# Patient Record
Sex: FEMALE | Race: BLACK OR AFRICAN AMERICAN | Employment: OTHER | URBAN - METROPOLITAN AREA
[De-identification: names, ages, dates, MRNs, and addresses within clinical notes are randomized per-mention and may not be internally consistent; named-entity substitution may affect disease eponyms.]

---

## 2017-02-22 ENCOUNTER — APPOINTMENT (OUTPATIENT)
Dept: GENERAL RADIOLOGY | Age: 72
End: 2017-02-22
Attending: EMERGENCY MEDICINE
Payer: MEDICARE

## 2017-02-22 ENCOUNTER — HOSPITAL ENCOUNTER (EMERGENCY)
Age: 72
Discharge: HOME | End: 2017-02-22
Attending: EMERGENCY MEDICINE | Admitting: FAMILY MEDICINE
Payer: MEDICARE

## 2017-02-22 ENCOUNTER — APPOINTMENT (OUTPATIENT)
Dept: CT IMAGING | Age: 72
End: 2017-02-22
Attending: FAMILY MEDICINE
Payer: MEDICARE

## 2017-02-22 VITALS
RESPIRATION RATE: 16 BRPM | HEART RATE: 93 BPM | WEIGHT: 204 LBS | BODY MASS INDEX: 34.83 KG/M2 | SYSTOLIC BLOOD PRESSURE: 148 MMHG | TEMPERATURE: 98 F | OXYGEN SATURATION: 98 % | DIASTOLIC BLOOD PRESSURE: 74 MMHG | HEIGHT: 64 IN

## 2017-02-22 DIAGNOSIS — R07.9 CHEST PAIN, UNSPECIFIED TYPE: Primary | ICD-10-CM

## 2017-02-22 PROBLEM — J44.9 COPD (CHRONIC OBSTRUCTIVE PULMONARY DISEASE) (HCC): Chronic | Status: ACTIVE | Noted: 2017-02-22

## 2017-02-22 LAB
ALBUMIN SERPL BCP-MCNC: 3.6 G/DL (ref 3.4–5)
ALBUMIN/GLOB SERPL: 0.8 {RATIO} (ref 0.8–1.7)
ALP SERPL-CCNC: 85 U/L (ref 45–117)
ALT SERPL-CCNC: 24 U/L (ref 13–56)
AMPHET UR QL SCN: NEGATIVE
ANION GAP BLD CALC-SCNC: 9 MMOL/L (ref 3–18)
APTT PPP: 26.5 SEC (ref 23–36.4)
AST SERPL W P-5'-P-CCNC: 19 U/L (ref 15–37)
ATRIAL RATE: 76 BPM
BARBITURATES UR QL SCN: NEGATIVE
BASOPHILS # BLD AUTO: 0 K/UL (ref 0–0.06)
BASOPHILS # BLD AUTO: 0 K/UL (ref 0–0.06)
BASOPHILS # BLD: 0 % (ref 0–2)
BASOPHILS # BLD: 0 % (ref 0–2)
BENZODIAZ UR QL: NEGATIVE
BILIRUB SERPL-MCNC: 0.5 MG/DL (ref 0.2–1)
BNP SERPL-MCNC: 243 PG/ML (ref 0–900)
BUN SERPL-MCNC: 10 MG/DL (ref 7–18)
BUN/CREAT SERPL: 12 (ref 12–20)
CALCIUM SERPL-MCNC: 9.1 MG/DL (ref 8.5–10.1)
CALCULATED P AXIS, ECG09: 49 DEGREES
CALCULATED R AXIS, ECG10: 18 DEGREES
CALCULATED T AXIS, ECG11: 64 DEGREES
CANNABINOIDS UR QL SCN: NEGATIVE
CHLORIDE SERPL-SCNC: 102 MMOL/L (ref 100–108)
CHOLEST SERPL-MCNC: 131 MG/DL
CK MB CFR SERPL CALC: 0.8 % (ref 0–4)
CK MB SERPL-MCNC: 1 NG/ML (ref 5–25)
CK SERPL-CCNC: 120 U/L (ref 26–192)
CO2 SERPL-SCNC: 31 MMOL/L (ref 21–32)
COCAINE UR QL SCN: NEGATIVE
CREAT SERPL-MCNC: 0.83 MG/DL (ref 0.6–1.3)
DIAGNOSIS, 93000: NORMAL
DIFFERENTIAL METHOD BLD: ABNORMAL
DIFFERENTIAL METHOD BLD: NORMAL
EOSINOPHIL # BLD: 0.1 K/UL (ref 0–0.4)
EOSINOPHIL # BLD: 0.4 K/UL (ref 0–0.4)
EOSINOPHIL NFR BLD: 1 % (ref 0–5)
EOSINOPHIL NFR BLD: 5 % (ref 0–5)
ERYTHROCYTE [DISTWIDTH] IN BLOOD BY AUTOMATED COUNT: 13 % (ref 11.6–14.5)
ERYTHROCYTE [DISTWIDTH] IN BLOOD BY AUTOMATED COUNT: 13.1 % (ref 11.6–14.5)
EST. AVERAGE GLUCOSE BLD GHB EST-MCNC: 177 MG/DL
GLOBULIN SER CALC-MCNC: 4.6 G/DL (ref 2–4)
GLUCOSE BLD STRIP.AUTO-MCNC: 190 MG/DL (ref 70–110)
GLUCOSE SERPL-MCNC: 105 MG/DL (ref 74–99)
HBA1C MFR BLD: 7.8 % (ref 4.5–5.6)
HCT VFR BLD AUTO: 37.7 % (ref 35–45)
HCT VFR BLD AUTO: 38 % (ref 35–45)
HDLC SERPL-MCNC: 52 MG/DL (ref 40–60)
HDLC SERPL: 2.5 {RATIO} (ref 0–5)
HDSCOM,HDSCOM: NORMAL
HGB BLD-MCNC: 12.7 G/DL (ref 12–16)
HGB BLD-MCNC: 12.7 G/DL (ref 12–16)
LDLC SERPL CALC-MCNC: 66.8 MG/DL (ref 0–100)
LIPASE SERPL-CCNC: 169 U/L (ref 73–393)
LIPID PROFILE,FLP: NORMAL
LYMPHOCYTES # BLD AUTO: 15 % (ref 21–52)
LYMPHOCYTES # BLD AUTO: 34 % (ref 21–52)
LYMPHOCYTES # BLD: 1 K/UL (ref 0.9–3.6)
LYMPHOCYTES # BLD: 2.6 K/UL (ref 0.9–3.6)
MAGNESIUM SERPL-MCNC: 1.5 MG/DL (ref 1.8–2.4)
MCH RBC QN AUTO: 30 PG (ref 24–34)
MCH RBC QN AUTO: 30 PG (ref 24–34)
MCHC RBC AUTO-ENTMCNC: 33.4 G/DL (ref 31–37)
MCHC RBC AUTO-ENTMCNC: 33.7 G/DL (ref 31–37)
MCV RBC AUTO: 89.1 FL (ref 74–97)
MCV RBC AUTO: 89.8 FL (ref 74–97)
METHADONE UR QL: NEGATIVE
MONOCYTES # BLD: 0.1 K/UL (ref 0.05–1.2)
MONOCYTES # BLD: 0.5 K/UL (ref 0.05–1.2)
MONOCYTES NFR BLD AUTO: 1 % (ref 3–10)
MONOCYTES NFR BLD AUTO: 6 % (ref 3–10)
NEUTS SEG # BLD: 4.2 K/UL (ref 1.8–8)
NEUTS SEG # BLD: 5.8 K/UL (ref 1.8–8)
NEUTS SEG NFR BLD AUTO: 55 % (ref 40–73)
NEUTS SEG NFR BLD AUTO: 83 % (ref 40–73)
OPIATES UR QL: NEGATIVE
P-R INTERVAL, ECG05: 168 MS
PCP UR QL: NEGATIVE
PHOSPHATE SERPL-MCNC: 3.2 MG/DL (ref 2.5–4.9)
PLATELET # BLD AUTO: 280 K/UL (ref 135–420)
PLATELET # BLD AUTO: 310 K/UL (ref 135–420)
PMV BLD AUTO: 10 FL (ref 9.2–11.8)
PMV BLD AUTO: 10.7 FL (ref 9.2–11.8)
POTASSIUM SERPL-SCNC: 4 MMOL/L (ref 3.5–5.5)
PROT SERPL-MCNC: 8.2 G/DL (ref 6.4–8.2)
Q-T INTERVAL, ECG07: 400 MS
QRS DURATION, ECG06: 78 MS
QTC CALCULATION (BEZET), ECG08: 450 MS
RBC # BLD AUTO: 4.23 M/UL (ref 4.2–5.3)
RBC # BLD AUTO: 4.23 M/UL (ref 4.2–5.3)
SODIUM SERPL-SCNC: 142 MMOL/L (ref 136–145)
TRIGL SERPL-MCNC: 61 MG/DL (ref ?–150)
TROPONIN I SERPL-MCNC: <0.02 NG/ML (ref 0–0.06)
TROPONIN I SERPL-MCNC: <0.02 NG/ML (ref 0–0.06)
VENTRICULAR RATE, ECG03: 76 BPM
VLDLC SERPL CALC-MCNC: 12.2 MG/DL
WBC # BLD AUTO: 7 K/UL (ref 4.6–13.2)
WBC # BLD AUTO: 7.6 K/UL (ref 4.6–13.2)

## 2017-02-22 PROCEDURE — 80053 COMPREHEN METABOLIC PANEL: CPT | Performed by: EMERGENCY MEDICINE

## 2017-02-22 PROCEDURE — 71275 CT ANGIOGRAPHY CHEST: CPT

## 2017-02-22 PROCEDURE — 93306 TTE W/DOPPLER COMPLETE: CPT

## 2017-02-22 PROCEDURE — 82550 ASSAY OF CK (CPK): CPT | Performed by: FAMILY MEDICINE

## 2017-02-22 PROCEDURE — 85730 THROMBOPLASTIN TIME PARTIAL: CPT | Performed by: FAMILY MEDICINE

## 2017-02-22 PROCEDURE — 80061 LIPID PANEL: CPT | Performed by: FAMILY MEDICINE

## 2017-02-22 PROCEDURE — 71010 XR CHEST PORT: CPT

## 2017-02-22 PROCEDURE — 74011000250 HC RX REV CODE- 250: Performed by: EMERGENCY MEDICINE

## 2017-02-22 PROCEDURE — 85025 COMPLETE CBC W/AUTO DIFF WBC: CPT | Performed by: EMERGENCY MEDICINE

## 2017-02-22 PROCEDURE — 83690 ASSAY OF LIPASE: CPT | Performed by: EMERGENCY MEDICINE

## 2017-02-22 PROCEDURE — 83880 ASSAY OF NATRIURETIC PEPTIDE: CPT | Performed by: EMERGENCY MEDICINE

## 2017-02-22 PROCEDURE — 74011636637 HC RX REV CODE- 636/637: Performed by: EMERGENCY MEDICINE

## 2017-02-22 PROCEDURE — 80307 DRUG TEST PRSMV CHEM ANLYZR: CPT | Performed by: FAMILY MEDICINE

## 2017-02-22 PROCEDURE — 99285 EMERGENCY DEPT VISIT HI MDM: CPT

## 2017-02-22 PROCEDURE — 74011636320 HC RX REV CODE- 636/320: Performed by: EMERGENCY MEDICINE

## 2017-02-22 PROCEDURE — 84100 ASSAY OF PHOSPHORUS: CPT | Performed by: FAMILY MEDICINE

## 2017-02-22 PROCEDURE — 77030013140 HC MSK NEB VYRM -A

## 2017-02-22 PROCEDURE — 94640 AIRWAY INHALATION TREATMENT: CPT

## 2017-02-22 PROCEDURE — 96360 HYDRATION IV INFUSION INIT: CPT

## 2017-02-22 PROCEDURE — 74011250637 HC RX REV CODE- 250/637: Performed by: EMERGENCY MEDICINE

## 2017-02-22 PROCEDURE — 84484 ASSAY OF TROPONIN QUANT: CPT | Performed by: EMERGENCY MEDICINE

## 2017-02-22 PROCEDURE — 83735 ASSAY OF MAGNESIUM: CPT | Performed by: FAMILY MEDICINE

## 2017-02-22 PROCEDURE — 83036 HEMOGLOBIN GLYCOSYLATED A1C: CPT | Performed by: FAMILY MEDICINE

## 2017-02-22 PROCEDURE — 74011250636 HC RX REV CODE- 250/636: Performed by: FAMILY MEDICINE

## 2017-02-22 PROCEDURE — 93005 ELECTROCARDIOGRAM TRACING: CPT

## 2017-02-22 PROCEDURE — 36415 COLL VENOUS BLD VENIPUNCTURE: CPT | Performed by: FAMILY MEDICINE

## 2017-02-22 PROCEDURE — 82962 GLUCOSE BLOOD TEST: CPT

## 2017-02-22 RX ORDER — NORTRIPTYLINE HYDROCHLORIDE 25 MG/1
100 CAPSULE ORAL
Status: ON HOLD | COMMUNITY
End: 2019-02-24

## 2017-02-22 RX ORDER — INSULIN GLARGINE 100 [IU]/ML
30 INJECTION, SOLUTION SUBCUTANEOUS DAILY
Status: ON HOLD | COMMUNITY
End: 2022-08-08

## 2017-02-22 RX ORDER — GABAPENTIN 300 MG/1
300 CAPSULE ORAL DAILY
COMMUNITY
End: 2021-08-12

## 2017-02-22 RX ORDER — ASPIRIN 325 MG
325 TABLET ORAL
Status: COMPLETED | OUTPATIENT
Start: 2017-02-22 | End: 2017-02-22

## 2017-02-22 RX ORDER — HEPARIN SODIUM 10000 [USP'U]/100ML
18-36 INJECTION, SOLUTION INTRAVENOUS
Status: DISCONTINUED | OUTPATIENT
Start: 2017-02-22 | End: 2017-02-22

## 2017-02-22 RX ORDER — HEPARIN SODIUM 1000 [USP'U]/ML
80 INJECTION, SOLUTION INTRAVENOUS; SUBCUTANEOUS ONCE
Status: COMPLETED | OUTPATIENT
Start: 2017-02-22 | End: 2017-02-22

## 2017-02-22 RX ORDER — MONTELUKAST SODIUM 10 MG/1
10 TABLET ORAL EVERY EVENING
Status: ON HOLD | COMMUNITY
End: 2019-02-25

## 2017-02-22 RX ORDER — PREDNISONE 20 MG/1
60 TABLET ORAL
Status: COMPLETED | OUTPATIENT
Start: 2017-02-22 | End: 2017-02-22

## 2017-02-22 RX ORDER — LANOLIN ALCOHOL/MO/W.PET/CERES
2000 CREAM (GRAM) TOPICAL DAILY
COMMUNITY
End: 2021-08-12

## 2017-02-22 RX ORDER — GLIMEPIRIDE 4 MG/1
4 TABLET ORAL
COMMUNITY
End: 2021-08-12

## 2017-02-22 RX ORDER — SIMVASTATIN 20 MG/1
20 TABLET, FILM COATED ORAL
COMMUNITY
End: 2018-07-30

## 2017-02-22 RX ORDER — GABAPENTIN 300 MG/1
600 CAPSULE ORAL EVERY EVENING
COMMUNITY
End: 2021-08-12

## 2017-02-22 RX ORDER — ACETAMINOPHEN 325 MG/1
325 TABLET ORAL
COMMUNITY
End: 2017-05-28

## 2017-02-22 RX ORDER — IPRATROPIUM BROMIDE AND ALBUTEROL SULFATE 2.5; .5 MG/3ML; MG/3ML
3 SOLUTION RESPIRATORY (INHALATION)
Status: COMPLETED | OUTPATIENT
Start: 2017-02-22 | End: 2017-02-22

## 2017-02-22 RX ORDER — HEPARIN SODIUM 5000 [USP'U]/ML
5000 INJECTION, SOLUTION INTRAVENOUS; SUBCUTANEOUS EVERY 8 HOURS
Status: DISCONTINUED | OUTPATIENT
Start: 2017-02-22 | End: 2017-02-22

## 2017-02-22 RX ORDER — SODIUM CHLORIDE 0.9 % (FLUSH) 0.9 %
5-10 SYRINGE (ML) INJECTION EVERY 8 HOURS
Status: DISCONTINUED | OUTPATIENT
Start: 2017-02-22 | End: 2017-02-22 | Stop reason: HOSPADM

## 2017-02-22 RX ORDER — LOSARTAN POTASSIUM AND HYDROCHLOROTHIAZIDE 12.5; 1 MG/1; MG/1
1 TABLET ORAL DAILY
COMMUNITY
End: 2021-08-12

## 2017-02-22 RX ORDER — IPRATROPIUM BROMIDE AND ALBUTEROL SULFATE 2.5; .5 MG/3ML; MG/3ML
3 SOLUTION RESPIRATORY (INHALATION)
Status: DISCONTINUED | OUTPATIENT
Start: 2017-02-22 | End: 2017-02-22 | Stop reason: HOSPADM

## 2017-02-22 RX ORDER — HYDROCODONE BITARTRATE AND ACETAMINOPHEN 5; 325 MG/1; MG/1
1 TABLET ORAL
Status: COMPLETED | OUTPATIENT
Start: 2017-02-22 | End: 2017-02-22

## 2017-02-22 RX ORDER — SODIUM CHLORIDE 0.9 % (FLUSH) 0.9 %
5-10 SYRINGE (ML) INJECTION AS NEEDED
Status: DISCONTINUED | OUTPATIENT
Start: 2017-02-22 | End: 2017-02-22 | Stop reason: HOSPADM

## 2017-02-22 RX ORDER — ATENOLOL 100 MG/1
200 TABLET ORAL DAILY
COMMUNITY

## 2017-02-22 RX ADMIN — HEPARIN SODIUM AND DEXTROSE 18 UNITS/KG/HR: 10000; 5 INJECTION INTRAVENOUS at 15:44

## 2017-02-22 RX ADMIN — IPRATROPIUM BROMIDE AND ALBUTEROL SULFATE 3 ML: .5; 3 SOLUTION RESPIRATORY (INHALATION) at 09:57

## 2017-02-22 RX ADMIN — PREDNISONE 60 MG: 20 TABLET ORAL at 10:04

## 2017-02-22 RX ADMIN — IOPAMIDOL 100 ML: 755 INJECTION, SOLUTION INTRAVENOUS at 15:08

## 2017-02-22 RX ADMIN — HYDROCODONE BITARTRATE AND ACETAMINOPHEN 1 TABLET: 5; 325 TABLET ORAL at 12:03

## 2017-02-22 RX ADMIN — ASPIRIN 325 MG ORAL TABLET 325 MG: 325 PILL ORAL at 10:04

## 2017-02-22 RX ADMIN — HEPARIN SODIUM 7400 UNITS: 1000 INJECTION, SOLUTION INTRAVENOUS; SUBCUTANEOUS at 15:43

## 2017-02-22 RX ADMIN — IPRATROPIUM BROMIDE AND ALBUTEROL SULFATE 3 ML: .5; 3 SOLUTION RESPIRATORY (INHALATION) at 09:55

## 2017-02-22 RX ADMIN — IPRATROPIUM BROMIDE AND ALBUTEROL SULFATE 3 ML: .5; 3 SOLUTION RESPIRATORY (INHALATION) at 09:56

## 2017-02-22 NOTE — ED NOTES
RN in room for purpose of hourly rounding. Room checked for trash and safety hazards. Patient is. ..    [  ] seated at bedside. [  ] lying in bed with side rails up and call bell in reach. [ x ] lying in with call bell in reach and continuous monitoring in place. Pain reduction interventions. .. [ x ] not indicated at this time      include the following   [  ] administration of analgesic medications   [  ] lights turned down   [  ] patient offered a cool washcloth   [  ] heat applied   [  ] ice applied   [  ] patient repositioned    Restroom needs addressed. Patient is. ... [x  ] Not in need restroom assistance at this time  [  ] Ambulatory as needed to the restroom  [  ] Assisted to bedside commode  [  ] Assisted with use of bed pan  [  ] Provided with a urinal    Position. .. [ x ] Patient does not require assistance with repositioning  [  ] Patient repositions with assistance of RN  [  ] Patient repositioned with assistance of RN and other ED staff.

## 2017-02-22 NOTE — ED TRIAGE NOTES
Patient c/o chest pain  Sepsis Screening completed    (  )Patient meets SIRS criteria. (x  )Patient does not meet SIRS criteria.       SIRS Criteria is achieved when two or more of the following are present   Temperature < 96.8°F (36°C) or > 100.9°F (38.3°C)   Heart Rate > 90 beats per minute   Respiratory Rate > 20 breaths per minute   WBC count > 12,000 or <4,000 or > 10% bands

## 2017-02-22 NOTE — LETTER
[facility] 
[facility] 
[facility] [place] [address] 
[phone] Work/School Note Date: 2/22/2017 To Whom It May concern: 
 
Nhung Stone was seen and treated today in the emergency room by the following provider(s): 
Attending Provider: Zaid Steen MD. Nhung Stone please excuse from work until 02/24/2017 Sincerely, 
 
 
 
 
Dr. Zaid Steen MD

## 2017-02-22 NOTE — ED PROVIDER NOTES
[address]  EMERGENCY DEPARTMENT HISTORY AND PHYSICAL EXAM       Date: 2/22/2017   Patient Name: Tonya Mg   YOB: 1945  Medical Record Number: [id number]    History of Presenting Illness     Chief Complaint   Patient presents with    Chest Pain        History Provided By:  patient    Additional History:   8:22 AM   Tonya Mg is a 80 y.o. female with a hx of HTN, asthma, prior MI, HLD, DM, and COPD, presenting to the ED c/o intermittent CP x 3 days. States last episode was last night, and now she feels pressure. Reports MI was about 4 years ago and Stents were placed. Dr. Ирина Mccoy was pt's cardiologist. Last stress test was 2 years ago resulting wnl. Pt takes ASA and Plavix daily. Associated sxs include cough, intermittent leg swelling, and dyspnea on exertion (onset ~ 2 months, not changing). Reports CP and SOB for the last week with walking. Reports having a recent URI prior to this occurrence of CP x 3 days. Reports today's pain feels more like COPD, and has been using Neb tx at home BID without relief. Other PMHx include neuropathy. Denies any other sxs or complaints.     Primary Care Provider: Eddy Goodell, MD   Specialist:    Past History     Past Medical History:   Past Medical History:   Diagnosis Date    Asthma     Chronic obstructive pulmonary disease ([address]     Diabetes ([address]     HTN (hypertension), benign     Hyperlipidemia     Menopause     Myocardial infarction ([address]     Neuropathy         Past Surgical History:   Past Surgical History:   Procedure Laterality Date    FOOT/TOES SURGERY PROC UNLISTED      HX CARPAL TUNNEL RELEASE      HX HEMORRHOIDECTOMY      HX HYSTERECTOMY      Age 40    HX KNEE REPLACEMENT          Family History:   Family History   Problem Relation Age of Onset   Alexy Bateman Breast Cancer Mother     Breast Cancer Sister         Social History:   Social History   Substance Use Topics    Smoking status: Former Smoker Packs/day: 0.30     Years: 5.00     Quit date: 3/17/2005    Smokeless tobacco: Not on file    Alcohol use No        Allergies: Allergies   Allergen Reactions    Ibuprofen Swelling     mouth        Review of Systems   Review of Systems   Constitutional: Negative for fatigue and fever. HENT: Negative for rhinorrhea and sore throat. Respiratory: Positive for cough and shortness of breath. Cardiovascular: Positive for chest pain and leg swelling. Negative for palpitations. Gastrointestinal: Negative for abdominal pain, diarrhea, nausea and vomiting. Genitourinary: Negative for difficulty urinating and dysuria. Musculoskeletal: Negative for arthralgias and myalgias. Skin: Negative for color change and rash. Neurological: Negative for light-headedness and headaches. All other systems reviewed and are negative. Physical Exam  Vitals:    02/22/17 0930 02/22/17 0945 02/22/17 0958 02/22/17 1000   BP: 142/73 (!) 157/138  108/89   Pulse: 74 83  69   Resp: 14 19  14   Temp:       SpO2: 98% 99% 100% 100%   Weight:       Height:           Physical Exam   Nursing note and vitals reviewed. Vital signs and nursing notes reviewed    CONSTITUTIONAL: Alert, in no apparent distress; well-developed; well-nourished. HEAD:  Normocephalic, atraumatic  EYES: PERRL; EOM's intact. ENTM: Nose: no rhinorrhea; Throat: no erythema or exudate, mucous membranes moist  Neck:  No JVD, supple without lymphadenopathy  RESP: bilateral expiratory wheezing. CV: S1 and S2 WNL; No murmurs, gallops or rubs. GI: Normal bowel sounds, abdomen soft and non-tender. No masses or organomegaly. : No costo-vertebral angle tenderness. BACK:  Non-tender  UPPER EXT:  Normal inspection  LOWER EXT: No edema, no calf tenderness. Distal pulses intact. NEURO: CN intact, reflexes 2/4 and sym, strength 5/5 and sym, sensation intact. SKIN: No rashes; Normal for age and stage. PSYCH:  Alert and oriented, normal affect.     Diagnostic Study Results     Labs -      Recent Results (from the past 12 hour(s))   EKG, 12 LEAD, INITIAL    Collection Time: 02/22/17  8:16 AM   Result Value Ref Range    Ventricular Rate 76 BPM    Atrial Rate 76 BPM    P-R Interval 168 ms    QRS Duration 78 ms    Q-T Interval 400 ms    QTC Calculation (Bezet) 450 ms    Calculated P Axis 49 degrees    Calculated R Axis 18 degrees    Calculated T Axis 64 degrees    Diagnosis       Normal sinus rhythm  Septal infarct , age undetermined  Abnormal ECG  When compared with ECG of 07-DEC-2015 19:58,  Septal infarct is now present     CBC WITH AUTOMATED DIFF    Collection Time: 02/22/17  8:42 AM   Result Value Ref Range    WBC 7.6 4.6 - 13.2 K/uL    RBC 4.23 4.20 - 5.30 M/uL    HGB 12.7 12.0 - 16.0 g/dL    HCT 38.0 35.0 - 45.0 %    MCV 89.8 74.0 - 97.0 FL    MCH 30.0 24.0 - 34.0 PG    MCHC 33.4 31.0 - 37.0 g/dL    RDW 13.1 11.6 - 14.5 %    PLATELET 576 345 - 284 K/uL    MPV 10.7 9.2 - 11.8 FL    NEUTROPHILS 55 40 - 73 %    LYMPHOCYTES 34 21 - 52 %    MONOCYTES 6 3 - 10 %    EOSINOPHILS 5 0 - 5 %    BASOPHILS 0 0 - 2 %    ABS. NEUTROPHILS 4.2 1.8 - 8.0 K/UL    ABS. LYMPHOCYTES 2.6 0.9 - 3.6 K/UL    ABS. MONOCYTES 0.5 0.05 - 1.2 K/UL    ABS. EOSINOPHILS 0.4 0.0 - 0.4 K/UL    ABS.  BASOPHILS 0.0 0.0 - 0.06 K/UL    DF AUTOMATED     LIPASE    Collection Time: 02/22/17  8:42 AM   Result Value Ref Range    Lipase 169 73 - 019 U/L   METABOLIC PANEL, COMPREHENSIVE    Collection Time: 02/22/17  8:42 AM   Result Value Ref Range    Sodium 142 136 - 145 mmol/L    Potassium 4.0 3.5 - 5.5 mmol/L    Chloride 102 100 - 108 mmol/L    CO2 31 21 - 32 mmol/L    Anion gap 9 3.0 - 18 mmol/L    Glucose 105 (H) 74 - 99 mg/dL    BUN 10 7.0 - 18 MG/DL    Creatinine 0.83 0.6 - 1.3 MG/DL    BUN/Creatinine ratio 12 12 - 20      GFR est AA >60 >60 ml/min/1.73m2    GFR est non-AA >60 >60 ml/min/1.73m2    Calcium 9.1 8.5 - 10.1 MG/DL    Bilirubin, total 0.5 0.2 - 1.0 MG/DL    ALT (SGPT) 24 13 - 56 U/L    AST (SGOT) 19 15 - 37 U/L    Alk. phosphatase 85 45 - 117 U/L    Protein, total 8.2 6.4 - 8.2 g/dL    Albumin 3.6 3.4 - 5.0 g/dL    Globulin 4.6 (H) 2.0 - 4.0 g/dL    A-G Ratio 0.8 0.8 - 1.7     TROPONIN I    Collection Time: 02/22/17  8:42 AM   Result Value Ref Range    Troponin-I, Qt. <0.02 0.00 - 0.06 NG/ML   PRO-BNP    Collection Time: 02/22/17  8:42 AM   Result Value Ref Range    NT pro- 0 - 900 PG/ML       Radiologic Studies -    XR CHEST PORT   Final Result   IMPRESSION:     1. Stable exam. No acute cardiopulmonary process.     As read by the radiologist.         Medical Decision Making   I am the first provider for this patient. I reviewed the vital signs, available nursing notes, past medical history, past surgical history, family history and social history. Vital Signs-Reviewed the patient's vital signs. Patient Vitals for the past 12 hrs:   Temp Pulse Resp BP SpO2   02/22/17 1000 - 69 14 108/89 100 %   02/22/17 0958 - - - - 100 %   02/22/17 0945 - 83 19 (!) 157/138 99 %   02/22/17 0930 - 74 14 142/73 98 %   02/22/17 0915 - 75 14 (!) 113/92 98 %   02/22/17 0900 - 74 15 161/71 98 %   02/22/17 0845 - 73 15 142/74 99 %   02/22/17 0830 - 80 15 - 97 %   02/22/17 0825 98.3 °F (36.8 °C) 75 18 154/76 100 %   02/22/17 0820 98.6 °F (37 °C) 69 14 108/89 100 %       Pulse Oximetry Analysis - Normal 100% on room air     Cardiac Monitor:   Rate: 77 bpm  Rhythm: Normal Sinus Rhythm      EKG interpretation: (Preliminary)  Rate 76 bpm. MI interval 168 ms. QRS duration 78 ms. QT/QTc 400/450 ms. NSR. Abnormal ECG. No ischemia. No STEMI. EKG read by Anisa Mayer MD at 8:16 AM     CARDIAC MONITOR NOTE:  10:40 AM   Cardiac Rhythm:  NSR  Rate: 74 bpm  Respiration rate 15. Pulse oximetry on room air is 98%. Intervention: continue to monitor. Contact cardilogy     Old Medical Records: Old medical records. Previous electrocardiograms. Nursing notes.      Provider Notes:   INITIAL CLINICAL IMPRESSION and PLANS:  The patient presents with the primary complaint(s) of: CP. The presentation, to include historical aspects and clinical findings are consistent with the DX of Atypical ACS. However, other possible DX's to consider as primary, associated with, or exacerbated by include:    1. Heart failure  2. PNA  3. COPD Exacerbation    Considering the above, my initial management plan to evaluate and therapeutic interventions include the following and as noted in the orders:    1. Labs: CBC, Lipase, CMP, Troponin, Pro-BNP  2. Imaging: CXR, EKG      Plan to obtain serial troponin, and call to cardiologist after Albuterol, prednisone, and ASA. ED Course:      Medications Given in the ED:  Medications   albuterol-ipratropium (DUO-NEB) 2.5 MG-0.5 MG/3 ML (3 mL Nebulization Given 2/22/17 0977)   aspirin (ASPIRIN) tablet 325 mg (325 mg Oral Given 2/22/17 1004)   predniSONE (DELTASONE) tablet 60 mg (60 mg Oral Given 2/22/17 1004)        PROGRESS NOTE:  8:22 AM   Initial assessment performed. 10:35 AM   The patient is feeling no change in CP after 3 DuoNebs. Will consult cardiologist for outpatient vs inpatient management. 10:47 AM   Discussed patient's history, exam, and available diagnostics results with Meri Garcia MD, cardiology, who agree to evaluate pt in ED. 11:03 AM Discussed patient's history, exam, and available diagnostics results with Jignesh Bernard DO, internal medicine, who agree with admission to telemetry. 11:03 AM  Patient is being admitted to the hospital by Jignesh Bernard DO. The results of their tests and reasons for their admission have been discussed with them and/or available family. They convey agreement and understanding for the need to be admitted and for their admission diagnosis. No distress. No change with albuterol. Neg trop. No ischemia on EKG. Plan to admit for stress. Last stress 2 years ago. CONDITIONS ON ADMISSION:  Thrombosis is not present at the time of admission.  Pneumonia is not present at the time of admission. MRSA is not present at the time of admission. Wound infection is not present at the time of admission. Pressure Ulcer is not present at the time of admission. Diagnosis   Clinical Impression:   1. Chest pain, unspecified type     2. Hypertension  _______________________________   Attestations: This note is prepared by Leonidas Plunkett, acting as a Scribe for Tamra Brooks MD on 8:19 AM on 2/22/2017 . Tamra Brooks MD: The scribe's documentation has been prepared under my direction and personally reviewed by me in its entirety.   _______________________________

## 2017-02-22 NOTE — ED NOTES
RN in room for purpose of hourly rounding. Room checked for trash and safety hazards. Patient is. ..    [  ] seated at bedside. [  ] lying in bed with side rails up and call bell in reach. [ x ] lying in with call bell in reach and continuous monitoring in place. Pain reduction interventions. .. [  x] not indicated at this time      include the following   [  ] administration of analgesic medications   [  ] lights turned down   [  ] patient offered a cool washcloth   [  ] heat applied   [  ] ice applied   [  ] patient repositioned    Restroom needs addressed. Patient is. ...    [  ] Not in need restroom assistance at this time  [ x ] Ambulatory as needed to the restroom  [  ] Assisted to bedside commode  [  ] Assisted with use of bed pan  [  ] Provided with a urinal    Position. .. [  x] Patient does not require assistance with repositioning  [  ] Patient repositions with assistance of RN  [  ] Patient repositioned with assistance of RN and other ED staff.

## 2017-02-22 NOTE — H&P
Medicine History and Physical    Patient: Rashawn Rosas   Age:  80 y.o.    HPI:   Rashawn Rosas is a 80 y.o. female who presents to the ED c/o chest pain that started 1 week ago but had gone away only to re-occur this morning. Pain is substernal and starts tight but then feels like pressure. Associated symptoms include numbness in toes (but not fingers), globus sensation, dyspnea, nausea, diaphoresis, and dizziness. She denies any recent changes to medications or activities. She noticed the pain last week when walking up the stairs but this episode woke her from sleep. She has had an MI in the past per records but has not followed with a cardiologist or a number of years. Cardiology was consulted by the ED physician and has ordered an echocardiogram.  She has been experiencing viral upper respiratory infection symptoms for the past 2 weeks that are very slowly improving.       Past Medical History:  Past Medical History:   Diagnosis Date    Asthma     [facility]     Diabetes ([facility]     HTN (hypertension), benign     Hyperlipidemia     Menopause     Myocardial infarction (HCC)     Neuropathy        Past Surgical History:  Past Surgical History:   Procedure Laterality Date    FOOT/TOES SURGERY PROC UNLISTED      HX CARPAL TUNNEL RELEASE      HX HEMORRHOIDECTOMY      HX HYSTERECTOMY      Age 40    HX KNEE REPLACEMENT         Family History:  Family History   Problem Relation Age of Onset   [facility] Breast Cancer Mother     Breast Cancer Sister        Social History:  Social History     Social History    Marital status:      Spouse name: N/A    Number of children: N/A    Years of education: N/A     Social History Main Topics    Smoking status: Former Smoker     Packs/day: 0.30     Years: 5.00     Quit date: 3/17/2005    Smokeless tobacco: None    Alcohol use No    Drug use: No    Sexual activity: Not Asked     Other Topics Concern    None     Social History Narrative       Home Medications:  Prior to Admission medications    Medication Sig Start Date End Date Taking? Authorizing Provider   atenolol (TENORMIN) 100 mg tablet Take 200 mg by mouth daily. Yes Historical Provider   gabapentin (NEURONTIN) 300 mg capsule Take 300 mg by mouth daily. 300mg am and 600mg pm   Yes Historical Provider   gabapentin (NEURONTIN) 300 mg capsule Take 600 mg by mouth every evening. Yes Historical Provider   losartan-hydroCHLOROthiazide (HYZAAR) 100-12.5 mg per tablet Take 1 Tab by mouth daily. Yes Historical Provider   simvastatin (ZOCOR) 20 mg tablet Take 20 mg by mouth nightly. Yes Historical Provider   nortriptyline (PAMELOR) 25 mg capsule Take 100 mg by mouth nightly. Yes Historical Provider   mometasone-formoterol (DULERA) 200-5 mcg/actuation HFA inhaler Take 2 Puffs by inhalation two (2) times a day. Yes Historical Provider   insulin glargine (LANTUS) 100 unit/mL injection 30 Units by SubCUTAneous route daily. Yes Historical Provider   montelukast (SINGULAIR) 10 mg tablet Take 10 mg by mouth every evening. Yes Historical Provider   cyanocobalamin 1,000 mcg tablet Take 2,000 mcg by mouth daily. Yes Historical Provider   glimepiride (AMARYL) 4 mg tablet Take 4 mg by mouth every morning. Yes Historical Provider   aspirin 81 mg chewable tablet Take 1 Tab by mouth daily. 12/9/15  Yes Fritz Steen MD   tiotropium bromide (SPIRIVA RESPIMAT) 2.5 mcg/actuation mist Take 1 Puff by inhalation two (2) times a day. Yes Krishna Heredia MD   metformin (GLUCOPHAGE) 850 mg tablet Take 850 mg by mouth three (3) times daily. 3/17/11  Yes Historical Provider   acetaminophen (TYLENOL) 325 mg tablet Take 325 mg by mouth every six (6) hours as needed for Pain. Historical Provider       Allergies:   Allergies   Allergen Reactions    Ibuprofen Swelling     mouth       Review of Systems  A comprehensive review of systems was negative except for that written in the History of Present Illness. Physical Exam:     Visit Vitals    /63    Pulse 93    Temp 98.3 °F (36.8 °C)    Resp 13    Ht 5' 4\" (1.626 m)    Wt 92.5 kg (204 lb)    SpO2 96%    BMI 35.02 kg/m2       Physical Exam:  General appearance: alert, cooperative, no distress, appears stated age  Head: Normocephalic, without obvious abnormality, atraumatic  Neck: supple, trachea midline  Lungs: faint scattered wheezing, otherwise clear to auscultation bilaterally  Heart: regular rate and rhythm, S1, S2 normal, no murmur, click, rub or gallop  Abdomen: soft, non-tender. Bowel sounds normal. No masses,  no organomegaly  Extremities: extremities normal, atraumatic, no cyanosis or edema; left posterior calf tenderness  Skin: Skin color, texture, turgor normal. No rashes or lesions  Neurologic: Grossly normal    Intake and Output:  Current Shift:     Last three shifts:       Lab/Data Reviewed: All lab results for the last 24 hours reviewed. Medications Reviewed. Assessment/Plan     Hospital Problems  Never Reviewed          Codes Class Noted POA    * (Principal)Chest pain in adult ICD-10-CM: R07.9  ICD-9-CM: 786.50  2/22/2017 Yes        COPD (chronic obstructive pulmonary disease) (HCC) (Chronic) ICD-10-CM: J44.9  ICD-9-CM: 757  2/22/2017 Yes        HTN (hypertension), benign ICD-10-CM: I10  ICD-9-CM: 401.1  Unknown Yes        Asthma ICD-10-CM: J45.909  ICD-9-CM: 493.90  Unknown Yes        Hyperlipidemia ICD-10-CM: E78.5  ICD-9-CM: 272.4  Unknown Yes            Atypical chest pain presentation. Given posterior calf tenderness and sudden onset of sharp chest pain, will rule out PE w/ CTA of chest.  Start heparin drip until this can be ruled out. Trend CEx2 more q6h. Echo pending. If echo normal, no PE, and CE remain negative, can likely discharge home later today w/ outpatient follow up, assuming cardiology does not want to perform an inpatient stress test.      Holding home medications pending above studies.   Keep NPO until surgical intervention ruled out. Duo-nebs prn for wheezing. FENGI: Saline lock; therapeutic heparin, NPO, no GI prophylaxis needed    Dispo: Observation status. Likely length of stay <48h or 2 midnights.      Lazaro Williamson DO

## 2017-02-22 NOTE — ED NOTES
Telephone call with Dr Williamson requesting diet for pt. States he is not ordering her a diet until she is cleared. States he will be down to see patient. Patient made aware of this .  Patient up to the bathroom via wheelchair with RN

## 2017-02-22 NOTE — ED NOTES
RN in room for purpose of hourly rounding. Room checked for trash and safety hazards. Patient is. ..    [  ] seated at bedside. [  ] lying in bed with side rails up and call bell in reach. [x  ] lying in with call bell in reach and continuous monitoring in place. Pain reduction interventions. ..    [  ] not indicated at this time      include the following   [  ] administration of analgesic medications   [  x] lights turned down   [  ] patient offered a cool washcloth   [  ] heat applied   [  ] ice applied   [  ] patient repositioned    Restroom needs addressed. Patient is. ...    [  ] Not in need restroom assistance at this time  [ x ] Ambulatory as needed to the restroom  [  ] Assisted to bedside commode  [  ] Assisted with use of bed pan  [  ] Provided with a urinal    Position. ..    [  ]x Patient does not require assistance with repositioning  [  ] Patient repositions with assistance of RN  [  ] Patient repositioned with assistance of RN and other ED staff.

## 2017-02-22 NOTE — Clinical Note
Status[de-identified] Inpatient [101] Type of Bed: Telemetry [19] Inpatient Hospitalization Certified Necessary for the Following Reasons: 3. Patient receiving treatment that can only be provided in an inpatient setting (further clarification in H&P documentation) Admitting Diagnosis: Chest pain in adult [[id number]] Admitting Physician: Eva Zapata [[id number]] Attending Physician: Eva Zapata [[id number]] Estimated Length of Stay: > or = to 2 Midnights Discharge Plan[de-identified] Home with Office Follow-up

## 2017-02-22 NOTE — ED NOTES
RN in room for purpose of hourly rounding. Room checked for trash and safety hazards. Patient is. ..    [  ] seated at bedside. [  ] lying in bed with side rails up and call bell in reach. [ x ] lying in with call bell in reach and continuous monitoring in place. Pain reduction interventions. .. [ x ] not indicated at this time      include the following   [  ] administration of analgesic medications   [  ] lights turned down   [  ] patient offered a cool washcloth   [  ] heat applied   [  ] ice applied   [  ] patient repositioned    Restroom needs addressed. Patient is. ... [x  ] Not in need restroom assistance at this time  [  ] Ambulatory as needed to the restroom  [  ] Assisted to bedside commode  [  ] Assisted with use of bed pan  [  ] Provided with a urinal    Position. .. [x  ] Patient does not require assistance with repositioning  [  ] Patient repositions with assistance of RN  [  ] Patient repositioned with assistance of RN and other ED staff.

## 2017-02-22 NOTE — ED NOTES
Consulted with Dr. Williamson about results of cta ordered to d/c heparin gtt and he will be down to see patient.

## 2017-02-22 NOTE — PROGRESS NOTES
Admission Medication Reconciliation has been performed on this ED patient consisting of interview of the patient regarding their PTA Home Medication List, Allergies and PMH as well as obtaining outpatient pharmacy information. Interviewed patient who was a good historian. Patient did  provide her medicine bottles with the exception of Gabapentin which pt states she is waiting for refill. Patient's outpatient pharmacy is [facility]  Smoking status is quit 15 yr ago  Alcohol use denies  Illicit drug use denies  Patient ABX use within the past 30 days = none  Has patient received any antineoplastics in the past 30 days? na  Has patient received any radiation treatments in the past 45 days?  na      Medication Reconciliation Interventions:   Wrong Medication Identified 1  Wrong/missing medication strength or dose identified  4  Wrong/missing Interval Identified 1  Wrong/missing Route Identified 0  Medication Duplication 0  Omissions 7  Commissions 6  Other Issue(s) Identified (Indicate): 0            Medication Compliance Issues and/or Medication Concerns: 0    Kylie Alfred [id number] Pharmacist  [phone]

## 2017-02-23 NOTE — CONSULTS
[address] [place] [address] Rd    Name:  Richa Chang  MR#:  [id number]  :  1945  Account #:  [de-identified]  Date of Adm:  2017  Date of Consultation:  2017      CONCLUSIONS  1. Chest pain, noncardiac. No evidence of acute coronary syndrome or  ST-elevation myocardial infarction. 2. Chronic obstructive pulmonary disease. 3. Asthma. 4. Diabetes. 5. Hypertension. 6. Hyperlipidemia. 7. Menopause. 8. Prior myocardial infarction. RECOMMENDATIONS: The patient I think is ruled out for a MI or ACS  at this time. She really presents with noncardiac symptoms per se with  a recent URI. I believe she can be discharged home and followed up  as an outpatient. It probably should have a stress test at some point in  the future as an outpatient since it has been a couple years since she  has had it done. DISCUSSION: The patient's records were reviewed. The patient was  interviewed and examined. The patient is a 81-year-old lady who presented to the ED complaining  of chest pain that started a week ago, but had gone away and recurred  this morning and brought her to the ED. She felt like she had a  pressure in her chest. She also had some numbness in her toes, but  not fingers. She has some dyspnea, nausea, diaphoresis and  dizziness. She had a recent upper respiratory infection. PAST MEDICAL HISTORY: Positive for asthma, COPD, diabetes,  hypertension, hyperlipidemia, menopause, myocardial infarction,  neuropathy. PAST SURGICAL HISTORY: Includes foot surgery, carpal tunnel  release, hemorrhoidectomy, hysterectomy, knee replacements. FAMILY HISTORY: Positive for breast cancer. SOCIAL HISTORY: The patient is . She is a former smoker. Denies alcohol use or illicit drug use. MEDICATIONS PRIOR TO ADMISSION  1. Tenormin. 2. Neurontin. 3. Hyzaar. 4. Pamelor. 5. Zocor. 6. Dulera. 7. Lantus. 8. Singulair. 9. B12.  10. Amaryl. 11. Aspirin.   12. Spiriva. 13. Glucophage. 14. Tylenol. DRUG ALLERGIES: IBUPROFEN. REVIEW OF SYSTEMS  GENERAL: The patient has had a recent URI, but no fever, chills,  weight loss or gain. HEENT: No headache, rhinorrhea, epistaxis, neck pain/stiffness, sore  throat. RESPIRATORY: Positive for recent URI symptoms. CARDIAC: Chest pain, but no typical chest pain. No syncope. No  palpitations. ABDOMINAL: No nausea, vomiting, diarrhea, hematochezia, or  melena. MUSCULOSKELETAL: No myalgias or arthralgias. GASTROINTESTINAL: No nausea, vomiting, diarrhea, hematochezia,  or melena. GENITOURINARY: No pyuria. No hematuria. ENDOCRINE: No polyphagia, polyuria, heat or cold intolerance. HEMATOLOGIC: No bruising or bleeding, known clotting disorder. NEUROLOGIC: No TIA, CVA, seizures. PHYSICAL EXAMINATION  GENERAL: Reveals a well-developed, well-nourished woman in no  severe distress. VITAL SIGNS: Blood pressure 150/65. HEENT: Pupils are equal and react to light and accommodation. Sclerae and conjunctivae are clear. No scleral icterus. Mouth, nose,  and throat appear normal. Oropharynx appears normal.  NECK: Supple. Thyroid is not enlarged. There is no JVD. Carotid  pulses are present bilaterally. There are no bruits. Trachea is in the  midline. CHEST: Lungs are clear to auscultation bilaterally. I do not hear any  rales, rhonchi or wheezes. CARDIAC: Precordium is normal to palpation. There are no heaves,  thrills, or ectopic impulses. First and second heart sounds are normal. I  do not hear any murmurs, gallops, or rubs. ABDOMEN: Soft. Liver and spleen are not enlarged. No abdominal  masses or bruits are appreciated. Bowel sounds are present. Abdominal aorta is not palpable. EXTREMITIES: No cyanosis or clubbing. Peripheral pulses are present  bilaterally. NEUROLOGICAL: The patient is alert and oriented. Cranial nerves 2-  12 appear intact. Motor and sensation are grossly intact.  Gait and  station are not tested. ASSESSMENT/PLAN: At this time, the patient is no evidence for acute  coronary syndrome, non-ST elevation myocardial infarction  or ST-  elevation myocardial infarction. I believe she can be discharged and  followed up as an outpatient. Thank you for asking me to see her.         MD Ila Jules / Cecilia Tomas  D:  02/22/2017   19:32  T:  02/22/2017   20:08  Job #:  [id number]

## 2017-02-23 NOTE — DISCHARGE INSTRUCTIONS
DISCHARGE SUMMARY from Nurse    The following personal items are in your possession at time of discharge:       Visual Aid: None                            PATIENT INSTRUCTIONS:      What to do at Home:       please follow up with Dr. Mehreen Schneider in 3-5 days. *  Please give a list of your current medications to your Primary Care Provider. *  Please update this list whenever your medications are discontinued, doses are      changed, or new medications (including over-the-counter products) are added. *  Please carry medication information at all times in case of emergency situations. These are general instructions for a healthy lifestyle:    No smoking/ No tobacco products/ Avoid exposure to second hand smoke    Surgeon General's Warning:  Quitting smoking now greatly reduces serious risk to your health. Obesity, smoking, and sedentary lifestyle greatly increases your risk for illness    A healthy diet, regular physical exercise & weight monitoring are important for maintaining a healthy lifestyle    You may be retaining fluid if you have a history of heart failure or if you experience any of the following symptoms:  Weight gain of 3 pounds or more overnight or 5 pounds in a week, increased swelling in our hands or feet or shortness of breath while lying flat in bed. Please call your doctor as soon as you notice any of these symptoms; do not wait until your next office visit. Recognize signs and symptoms of STROKE:    F-face looks uneven    A-arms unable to move or move unevenly    S-speech slurred or non-existent    T-time-call 911 as soon as signs and symptoms begin-DO NOT go       Back to bed or wait to see if you get better-TIME IS BRAIN. Warning Signs of HEART ATTACK     Call 911 if you have these symptoms:   Chest discomfort. Most heart attacks involve discomfort in the center of the chest that lasts more than a few minutes, or that goes away and comes back.  It can feel like uncomfortable pressure, squeezing, fullness, or pain.  Discomfort in other areas of the upper body. Symptoms can include pain or discomfort in one or both arms, the back, neck, jaw, or stomach.  Shortness of breath with or without chest discomfort.  Other signs may include breaking out in a cold sweat, nausea, or lightheadedness. Don't wait more than five minutes to call 911 - MINUTES MATTER! Fast action can save your life. Calling 911 is almost always the fastest way to get lifesaving treatment. Emergency Medical Services staff can begin treatment when they arrive -- up to an hour sooner than if someone gets to the hospital by car. The discharge information has been reviewed with the patient. The patient verbalized understanding. Discharge medications reviewed with the patient and appropriate educational materials and side effects teaching were provided.

## 2017-02-23 NOTE — ED NOTES
Bedside report complete. Patient was introduced to oncoming  [place] ([facility]). Patient updated on plan of care. Safety check performed: Bed locked in low posiiton. Side rails up. Call bell and personal items within reach.  Waiting for

## 2017-05-21 ENCOUNTER — APPOINTMENT (OUTPATIENT)
Dept: CT IMAGING | Age: 72
End: 2017-05-21
Attending: EMERGENCY MEDICINE
Payer: MEDICARE

## 2017-05-21 ENCOUNTER — HOSPITAL ENCOUNTER (EMERGENCY)
Age: 72
Discharge: HOME | End: 2017-05-21
Attending: EMERGENCY MEDICINE
Payer: MEDICARE

## 2017-05-21 VITALS
BODY MASS INDEX: 32.44 KG/M2 | DIASTOLIC BLOOD PRESSURE: 57 MMHG | HEART RATE: 81 BPM | WEIGHT: 190 LBS | HEIGHT: 64 IN | SYSTOLIC BLOOD PRESSURE: 162 MMHG | OXYGEN SATURATION: 95 % | RESPIRATION RATE: 18 BRPM | TEMPERATURE: 97.6 F

## 2017-05-21 DIAGNOSIS — M48.061 LUMBAR CANAL STENOSIS: ICD-10-CM

## 2017-05-21 DIAGNOSIS — M54.31 SCIATICA, RIGHT SIDE: Primary | ICD-10-CM

## 2017-05-21 PROCEDURE — 74011250636 HC RX REV CODE- 250/636: Performed by: INTERNAL MEDICINE

## 2017-05-21 PROCEDURE — 74011250636 HC RX REV CODE- 250/636: Performed by: EMERGENCY MEDICINE

## 2017-05-21 PROCEDURE — 72131 CT LUMBAR SPINE W/O DYE: CPT

## 2017-05-21 PROCEDURE — 96375 TX/PRO/DX INJ NEW DRUG ADDON: CPT

## 2017-05-21 PROCEDURE — 99283 EMERGENCY DEPT VISIT LOW MDM: CPT

## 2017-05-21 PROCEDURE — 96374 THER/PROPH/DIAG INJ IV PUSH: CPT

## 2017-05-21 RX ORDER — KETOROLAC TROMETHAMINE 30 MG/ML
15 INJECTION, SOLUTION INTRAMUSCULAR; INTRAVENOUS
Status: COMPLETED | OUTPATIENT
Start: 2017-05-21 | End: 2017-05-21

## 2017-05-21 RX ORDER — HYDROCODONE BITARTRATE AND ACETAMINOPHEN 5; 325 MG/1; MG/1
TABLET ORAL
Qty: 12 TAB | Refills: 0 | Status: SHIPPED | OUTPATIENT
Start: 2017-05-21 | End: 2017-05-28

## 2017-05-21 RX ORDER — SODIUM CHLORIDE 0.9 % (FLUSH) 0.9 %
5-10 SYRINGE (ML) INJECTION EVERY 8 HOURS
Status: DISCONTINUED | OUTPATIENT
Start: 2017-05-21 | End: 2017-05-21 | Stop reason: HOSPADM

## 2017-05-21 RX ORDER — DOXYCYCLINE 100 MG/1
100 CAPSULE ORAL 2 TIMES DAILY
COMMUNITY
End: 2017-11-20 | Stop reason: ALTCHOICE

## 2017-05-21 RX ORDER — METHYLPREDNISOLONE 4 MG/1
TABLET ORAL
Qty: 1 DOSE PACK | Refills: 0 | Status: SHIPPED | OUTPATIENT
Start: 2017-05-21 | End: 2017-05-21

## 2017-05-21 RX ORDER — NAPROXEN SODIUM 220 MG
440 TABLET ORAL 2 TIMES DAILY WITH MEALS
Qty: 20 TAB | Refills: 0 | Status: SHIPPED | OUTPATIENT
Start: 2017-05-21 | End: 2018-10-12

## 2017-05-21 RX ORDER — SODIUM CHLORIDE 0.9 % (FLUSH) 0.9 %
5-10 SYRINGE (ML) INJECTION AS NEEDED
Status: DISCONTINUED | OUTPATIENT
Start: 2017-05-21 | End: 2017-05-21 | Stop reason: HOSPADM

## 2017-05-21 RX ORDER — HYDROMORPHONE HYDROCHLORIDE 1 MG/ML
1 INJECTION, SOLUTION INTRAMUSCULAR; INTRAVENOUS; SUBCUTANEOUS ONCE
Status: COMPLETED | OUTPATIENT
Start: 2017-05-21 | End: 2017-05-21

## 2017-05-21 RX ORDER — METHYLPREDNISOLONE 4 MG/1
TABLET ORAL
Qty: 1 DOSE PACK | Refills: 0 | Status: SHIPPED | OUTPATIENT
Start: 2017-05-21 | End: 2018-07-23

## 2017-05-21 RX ORDER — ONDANSETRON 2 MG/ML
4 INJECTION INTRAMUSCULAR; INTRAVENOUS
Status: COMPLETED | OUTPATIENT
Start: 2017-05-21 | End: 2017-05-21

## 2017-05-21 RX ORDER — NAPROXEN SODIUM 220 MG
440 TABLET ORAL 2 TIMES DAILY WITH MEALS
Qty: 20 TAB | Refills: 0 | Status: SHIPPED | OUTPATIENT
Start: 2017-05-21 | End: 2017-05-21

## 2017-05-21 RX ADMIN — KETOROLAC TROMETHAMINE 15 MG: 30 INJECTION, SOLUTION INTRAMUSCULAR at 08:17

## 2017-05-21 RX ADMIN — HYDROMORPHONE HYDROCHLORIDE 1 MG: 1 INJECTION, SOLUTION INTRAMUSCULAR; INTRAVENOUS; SUBCUTANEOUS at 06:52

## 2017-05-21 RX ADMIN — ONDANSETRON 4 MG: 2 INJECTION INTRAMUSCULAR; INTRAVENOUS at 06:51

## 2017-05-21 RX ADMIN — METHYLPREDNISOLONE SODIUM SUCCINATE 125 MG: 125 INJECTION, POWDER, FOR SOLUTION INTRAMUSCULAR; INTRAVENOUS at 06:52

## 2017-05-21 NOTE — ED NOTES
Bedside shift change report given to Daisy Bates RN (oncoming nurse) by Zonia Hermosillo RN (offgoing nurse). Report included the following information SBAR, ED Summary and MAR. Patient being transported to CT.  remains at bedside.

## 2017-05-21 NOTE — ED TRIAGE NOTES
C/o right leg pain for the last several days worsening this morning. Described as sharp shoot pain down leg. Hx of lower back problems and surgeries.

## 2017-05-21 NOTE — DISCHARGE INSTRUCTIONS
Lumbar Stenosis: Care Instructions  Your Care Instructions    Stenosis in the spine is a narrowing of the canal that is around the spinal cord and nerve roots in your back. It can happen as part of aging. Sometimes bone and other tissue grow into this canal and press on the spinal nerves. This can cause pain, numbness, and weakness. When it happens in the lower part of your back, it is called lumbar stenosis. Lumbar stenosis can cause problems in the legs, feet, and rear end (buttocks). You may be able to relieve symptoms of lumbar stenosis with pain medicine. Your doctor may suggest physical therapy and exercises to keep your spine strong and flexible. Some people try cortisone shots to reduce swelling. If pain and numbness in your legs are still so bad that you cannot do your normal activities, you may need surgery. Follow-up care is a key part of your treatment and safety. Be sure to make and go to all appointments, and call your doctor if you are having problems. It's also a good idea to know your test results and keep a list of the medicines you take. How can you care for yourself at home? · Take an over-the-counter pain medicine, such as acetaminophen (Tylenol), ibuprofen (Advil, Motrin), or naproxen (Aleve). Read and follow all instructions on the label. · Do not take two or more pain medicines at the same time unless the doctor told you to. Many pain medicines have acetaminophen, which is Tylenol. Too much acetaminophen (Tylenol) can be harmful. · Stay at a healthy weight. Being overweight puts extra strain on your spine. · Change positions often when you sit or stand. This can ease pain. For example, lean forward. This may reduce pressure on the spinal cord and its nerves. · Avoid doing things that make your symptoms worse. Walking downhill and standing for a long time may cause pain. · Stretch and strengthen your back muscles as your doctor or physical therapist recommends.  If your doctor says it is okay to do them, these exercises may help. ¨ Lie on your back with your knees bent. Gently pull one bent knee to your chest. Put that foot back on the floor, and then pull the other knee to your chest.  ¨ Do pelvic tilts. Lie on your back with your knees bent. Tighten your stomach muscles. Pull your belly button (navel) in and up toward your ribs. You should feel like your back is pressing to the floor and your hips and pelvis are slightly lifting off the floor. Hold for 6 seconds while breathing smoothly. ¨ Stand with your back flat against a wall. Slowly slide down until your knees are slightly bent. Hold for 10 seconds, then slide back up the wall. · Remove or change anything in your house that may cause you to fall. Keep walkways clear of clutter, electrical cords, and throw rugs. When should you call for help? Call 911 anytime you think you may need emergency care. For example, call if:  · You are unable to move a leg at all. Call your doctor now or seek immediate medical care if:  · You have new or worse symptoms in your legs, belly, or buttocks. Symptoms may include:  ¨ Numbness or tingling. ¨ Weakness. ¨ Pain. · You lose bladder or bowel control. Watch closely for changes in your health, and be sure to contact your doctor if:  · You are not getting better as expected. Where can you learn more? Go to http://joseph-khalida.info/. Tanner Bazan in the search box to learn more about \"Lumbar Stenosis: Care Instructions. \"  Current as of: May 23, 2016  Content Version: 11.2  © 6361-8685 [employer]. Care instructions adapted under license by [employer] (which disclaims liability or warranty for this information). If you have questions about a medical condition or this instruction, always ask your healthcare professional. [address] any warranty or liability for your use of this information.        Sciatica: Care Instructions  Your Care Instructions    Sciatica (say \"kos-FJ-bm-kuh\") is an irritation of one of the sciatic nerves, which come from the spinal cord in the lower back. The sciatic nerves and their branches extend down through the buttock to the foot. Sciatica can develop when an injured disc in the back presses against a spinal nerve root. Its main symptom is pain, numbness, or weakness that is often worse in the leg or foot than in the back. Sciatica often will improve and go away with time. Early treatment usually includes medicines and exercises to relieve pain. Follow-up care is a key part of your treatment and safety. Be sure to make and go to all appointments, and call your doctor if you are having problems. It's also a good idea to know your test results and keep a list of the medicines you take. How can you care for yourself at home? · Take pain medicines exactly as directed. ¨ If the doctor gave you a prescription medicine for pain, take it as prescribed. ¨ If you are not taking a prescription pain medicine, ask your doctor if you can take an over-the-counter medicine. · Use heat or ice to relieve pain. ¨ To apply heat, put a warm water bottle, heating pad set on low, or warm cloth on your back. Do not go to sleep with a heating pad on your skin. ¨ To use ice, put ice or a cold pack on the area for 10 to 20 minutes at a time. Put a thin cloth between the ice and your skin. · Avoid sitting if possible, unless it feels better than standing. · Alternate lying down with short walks. Increase your walking distance as you are able to without making your symptoms worse. · Do not do anything that makes your symptoms worse. When should you call for help? Call 911 anytime you think you may need emergency care. For example, call if:  · You are unable to move a leg at all. Call your doctor now or seek immediate medical care if:  · You have new or worse symptoms in your legs or buttocks.  Symptoms may include:  ¨ Numbness or tingling. ¨ Weakness. ¨ Pain. · You lose bladder or bowel control. Watch closely for changes in your health, and be sure to contact your doctor if:  · You are not getting better as expected. Where can you learn more? Go to http://joseph-khalida.info/. Enter [phone] in the search box to learn more about \"Sciatica: Care Instructions. \"  Current as of: May 23, 2016  Content Version: 11.2  © 2715-4888 [employer]. Care instructions adapted under license by [employer] (which disclaims liability or warranty for this information). If you have questions about a medical condition or this instruction, always ask your healthcare professional. [address] any warranty or liability for your use of this information.
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
## 2017-05-28 ENCOUNTER — HOSPITAL ENCOUNTER (EMERGENCY)
Age: 72
Discharge: HOME | End: 2017-05-28
Attending: INTERNAL MEDICINE
Payer: MEDICARE

## 2017-05-28 VITALS
TEMPERATURE: 99.5 F | WEIGHT: 190 LBS | HEIGHT: 64 IN | OXYGEN SATURATION: 98 % | RESPIRATION RATE: 16 BRPM | DIASTOLIC BLOOD PRESSURE: 62 MMHG | BODY MASS INDEX: 32.44 KG/M2 | HEART RATE: 79 BPM | SYSTOLIC BLOOD PRESSURE: 157 MMHG

## 2017-05-28 DIAGNOSIS — M54.16 LUMBAR BACK PAIN WITH RADICULOPATHY AFFECTING RIGHT LOWER EXTREMITY: Primary | ICD-10-CM

## 2017-05-28 DIAGNOSIS — M71.21 SYNOVIAL CYST OF POPLITEAL SPACE (BAKER), RIGHT KNEE: ICD-10-CM

## 2017-05-28 PROCEDURE — 74011250637 HC RX REV CODE- 250/637: Performed by: PHYSICIAN ASSISTANT

## 2017-05-28 PROCEDURE — 93971 EXTREMITY STUDY: CPT

## 2017-05-28 PROCEDURE — 96372 THER/PROPH/DIAG INJ SC/IM: CPT

## 2017-05-28 PROCEDURE — 74011250636 HC RX REV CODE- 250/636: Performed by: PHYSICIAN ASSISTANT

## 2017-05-28 PROCEDURE — 99284 EMERGENCY DEPT VISIT MOD MDM: CPT

## 2017-05-28 RX ORDER — OXYCODONE AND ACETAMINOPHEN 5; 325 MG/1; MG/1
1 TABLET ORAL
Status: COMPLETED | OUTPATIENT
Start: 2017-05-28 | End: 2017-05-28

## 2017-05-28 RX ORDER — ONDANSETRON 4 MG/1
4 TABLET, ORALLY DISINTEGRATING ORAL
Status: COMPLETED | OUTPATIENT
Start: 2017-05-28 | End: 2017-05-28

## 2017-05-28 RX ORDER — OXYCODONE AND ACETAMINOPHEN 5; 325 MG/1; MG/1
1 TABLET ORAL
Qty: 10 TAB | Refills: 0 | Status: SHIPPED | OUTPATIENT
Start: 2017-05-28 | End: 2017-06-17

## 2017-05-28 RX ORDER — KETOROLAC TROMETHAMINE 15 MG/ML
15 INJECTION, SOLUTION INTRAMUSCULAR; INTRAVENOUS
Status: COMPLETED | OUTPATIENT
Start: 2017-05-28 | End: 2017-05-28

## 2017-05-28 RX ORDER — HYDROMORPHONE HYDROCHLORIDE 1 MG/ML
0.5 INJECTION, SOLUTION INTRAMUSCULAR; INTRAVENOUS; SUBCUTANEOUS
Status: COMPLETED | OUTPATIENT
Start: 2017-05-28 | End: 2017-05-28

## 2017-05-28 RX ADMIN — HYDROMORPHONE HYDROCHLORIDE 0.5 MG: 1 INJECTION, SOLUTION INTRAMUSCULAR; INTRAVENOUS; SUBCUTANEOUS at 11:49

## 2017-05-28 RX ADMIN — ONDANSETRON 4 MG: 4 TABLET, ORALLY DISINTEGRATING ORAL at 11:48

## 2017-05-28 RX ADMIN — OXYCODONE HYDROCHLORIDE AND ACETAMINOPHEN 1 TABLET: 5; 325 TABLET ORAL at 12:46

## 2017-05-28 RX ADMIN — KETOROLAC TROMETHAMINE 15 MG: 15 INJECTION, SOLUTION INTRAMUSCULAR; INTRAVENOUS at 11:51

## 2017-05-28 NOTE — ED NOTES
Discharge instructions reviewed with the patient with opportunity for questions given. The patient verbalized understanding. Patient armband removed and shredded. Patient in stable condition. Patient waiting on ride, states on his way will be here in approximately 30 min.

## 2017-05-28 NOTE — PROCEDURES
[facility]  *** FINAL REPORT ***    Name: Olga Woody  MRN: [id number]  : 22 Dec 1945  HIS Order #: [id number]  [address] [facility] Visit #: [id number]  Date: 28 May 2017    TYPE OF TEST: Peripheral Venous Testing    REASON FOR TEST  Pain in limb    Right Leg:-  Deep venous thrombosis:           No  Superficial venous thrombosis:    No  Deep venous insufficiency:        Not examined  Superficial venous insufficiency: Not examined      INTERPRETATION/FINDINGS  Right leg :  1. Deep vein(s) visualized include the common femoral, proximal  femoral, mid femoral, distal femoral, popliteal(above knee),  popliteal(fossa), popliteal(below knee), posterior tibial and peroneal   veins. 2. No evidence of deep venous thrombosis detected in the veins  visualized. 3. No evidence of deep vein thrombosis in the contralateral common  femoral vein. 4. Superficial vein(s) visualized include the great saphenous vein. 5. No evidence of superficial thrombosis detected. ADDITIONAL COMMENTS  Complex cyst noted in the right popliteal fossa measuring 5.7 x 2.2 x  3.6 cm    I have personally reviewed the data relevant to the interpretation of  this  study.     TECHNOLOGIST: Carol Tamez, [facility], RVT/  Signed: 2017 11:40 AM    PHYSICIAN: Jelena Field MD  Signed: 2017 03:42 PM

## 2017-05-28 NOTE — ED NOTES
Pt resting supine in stretcher with light sheet for comfort. Pt tearful.  to bedside. Call light within reach.

## 2017-05-28 NOTE — ED PROVIDER NOTES
[address]  EMERGENCY DEPARTMENT HISTORY AND PHYSICAL EXAM       Date: 5/28/2017   Patient Name: Thong Kumar   YOB: 1945  Medical Record Number: [id number]    History of Presenting Illness     Chief Complaint   Patient presents with    Hip Pain        History Provided By:  patient    Additional History: 10:41 AM  Thong Kumar is a 80 y.o. female who presents to the emergency department via  C/O right hip pain (10/10) that radiates down to her right toes. Pain is worse when bearing weight on it. Symptoms include right leg weakness. Pt states she was seen here in the ED last week for the same pain and a CT. Pt was prescribed pain medication without relief. Pt was told she could not have an MRI due to having pins in her back. Pt followed up with her PCP 7 days ago. SHx knee and back. Pt had her back surgery by Dr. Ravin Alberts, but has not followed up with him about her current pain. Pt states she had Toradol and Dilaudid in the past without any adverse reaction. Pt denies new injury or fall, recent long distance travel, numbness, and any other symptoms or complaints.      Primary Care Provider: Zachary Ayers MD   Specialist:    Past History     Past Medical History:   Past Medical History:   Diagnosis Date    Asthma     Chronic obstructive pulmonary disease ([address]     Diabetes ([address]     HTN (hypertension), benign     Hyperlipidemia     Menopause     [facility]     Neuropathy     Sciatica         Past Surgical History:   Past Surgical History:   Procedure Laterality Date    FOOT/TOES SURGERY PROC UNLISTED      HX CARPAL TUNNEL RELEASE      HX HEMORRHOIDECTOMY      HX HYSTERECTOMY      Age 40    HX KNEE REPLACEMENT          Family History:   Family History   Problem Relation Age of Onset    Breast Cancer Mother     Breast Cancer Sister         Social History:   Social History   Substance Use Topics    Smoking status: Former Smoker Packs/day: 0.30     Years: 5.00     Quit date: 3/17/2005    Smokeless tobacco: Not on file    Alcohol use No        Allergies: Allergies   Allergen Reactions    Ibuprofen Swelling     mouth        Review of Systems   Review of Systems   Musculoskeletal: Positive for arthralgias (right hip pain that radiates down to her right toes). Neurological: Positive for weakness (right leg). Negative for numbness. All other systems reviewed and are negative. Physical Exam  Vitals:    05/28/17 1034 05/28/17 1148 05/28/17 1247   BP: 200/74 178/90 157/62   Pulse: 89 92 79   Resp: 16 16 16   Temp: 99.5 °F (37.5 °C)     SpO2: 97% 98% 98%   Weight: 86.2 kg (190 lb)     Height: 5' 4\" (1.626 m)         Physical Exam   Constitutional: She is oriented to person, place, and time. She appears well-developed and well-nourished. She appears distressed (moderate pain distress). HENT:   Head: Normocephalic and atraumatic. Musculoskeletal:        Legs:  Neurological: She is alert and oriented to person, place, and time. Skin: Skin is warm and dry. No rash noted. She is not diaphoretic. No erythema. Psychiatric: She has a normal mood and affect. Her behavior is normal.   Nursing note and vitals reviewed. Diagnostic Study Results     Labs -    No results found for this or any previous visit (from the past 12 hour(s)). Radiologic Studies -    The following have been ordered and reviewed:  DUPLEX LOWER EXT VENOUS RIGHT   Right leg :  1. Deep vein(s) visualized include the common femoral, proximal  femoral, mid femoral, distal femoral, popliteal(above knee),  popliteal(fossa), popliteal(below knee), posterior tibial and peroneal  veins. 2. No evidence of deep venous thrombosis detected in the veins  visualized. 3. No evidence of deep vein thrombosis in the contralateral common  femoral vein. 4. Superficial vein(s) visualized include the great saphenous vein. 5. No evidence of superficial thrombosis detected.   As read by the radiologist.               Medical Decision Making   I am the first provider for this patient. I reviewed the vital signs, available nursing notes, past medical history, past surgical history, family history and social history. Vital Signs-Reviewed the patient's vital signs. Patient Vitals for the past 12 hrs:   Temp Pulse Resp BP SpO2   05/28/17 1247 - 79 16 157/62 98 %   05/28/17 1148 - 92 16 178/90 98 %   05/28/17 1034 99.5 °F (37.5 °C) 89 16 200/74 97 %       Pulse Oximetry Analysis - Normal 100% on room air. Old Medical Records: Old medical records. Nursing notes. Procedures:   Procedures    ED Course:  10:41 AM   Initial assessment performed. The patients presenting problems have been discussed, and they are in agreement with the care plan formulated and outlined with them. I have encouraged them to ask questions as they arise throughout their visit. Medications Given in the ED:  Medications   ketorolac (TORADOL) injection 15 mg (15 mg IntraMUSCular Given 5/28/17 1151)   HYDROmorphone (PF) (DILAUDID) injection 0.5 mg (0.5 mg IntraMUSCular Given 5/28/17 1149)   ondansetron (ZOFRAN ODT) tablet 4 mg (4 mg Oral Given 5/28/17 1148)   oxyCODONE-acetaminophen (PERCOCET) 5-325 mg per tablet 1 Tab (1 Tab Oral Given 5/28/17 1246)       Discharge Note:  12:31 PM   Pt has been reexamined. Patient has no new complaints, changes, or physical findings. Care plan outlined and precautions discussed. Results were reviewed with the patient. All medications were reviewed with the patient; will d/c home. All of pt's questions and concerns were addressed. Patient was instructed and agrees to follow up with orthopedist, as well as to return to the ED upon further deterioration. Patient is ready to go home. Diagnosis   Clinical Impression:   1. Lumbar back pain with radiculopathy affecting right lower extremity    2.  Synovial cyst of popliteal space ([place]), right knee         Follow-up Information     Follow up With Details Comments Contact Info    Carissa Solis MD Schedule an appointment as soon as possible for a visit in 2 days for orthopedic follow up. [address]      THE [facility] EMERGENCY DEPT  As needed, If symptoms worsen [address]  [phone]          Current Discharge Medication List      START taking these medications    Details   oxyCODONE-acetaminophen (PERCOCET) 5-325 mg per tablet Take 1 Tab by mouth every six (6) hours as needed for Pain. Max Daily Amount: 4 Tabs. Qty: 10 Tab, Refills: 0         CONTINUE these medications which have NOT CHANGED    Details   methylPREDNISolone (MEDROL, PATTI,) 4 mg tablet Per dose pack instructions  Qty: 1 Dose Pack, Refills: 0      naproxen sodium (ALEVE) 220 mg tablet Take 2 Tabs by mouth two (2) times daily (with meals). Qty: 20 Tab, Refills: 0      atenolol (TENORMIN) 100 mg tablet Take 200 mg by mouth daily. !! gabapentin (NEURONTIN) 300 mg capsule Take 300 mg by mouth daily. 300mg am and 600mg pm      !! gabapentin (NEURONTIN) 300 mg capsule Take 600 mg by mouth every evening. losartan-hydroCHLOROthiazide (HYZAAR) 100-12.5 mg per tablet Take 1 Tab by mouth daily. simvastatin (ZOCOR) 20 mg tablet Take 20 mg by mouth nightly. nortriptyline (PAMELOR) 25 mg capsule Take 100 mg by mouth nightly. mometasone-formoterol (DULERA) 200-5 mcg/actuation HFA inhaler Take 2 Puffs by inhalation two (2) times a day. insulin glargine (LANTUS) 100 unit/mL injection 30 Units by SubCUTAneous route daily. montelukast (SINGULAIR) 10 mg tablet Take 10 mg by mouth every evening. cyanocobalamin 1,000 mcg tablet Take 2,000 mcg by mouth daily. glimepiride (AMARYL) 4 mg tablet Take 4 mg by mouth every morning. aspirin 81 mg chewable tablet Take 1 Tab by mouth daily.   Qty: 30 Tab, Refills: 0      tiotropium bromide (SPIRIVA RESPIMAT) 2.5 mcg/actuation mist Take 1 Puff by inhalation two (2) times a day. metformin (GLUCOPHAGE) 850 mg tablet Take 850 mg by mouth three (3) times daily. !! - Potential duplicate medications found. Please discuss with provider. STOP taking these medications       HYDROcodone-acetaminophen (NORCO) 5-325 mg per tablet Comments:   Reason for Stopping:         acetaminophen (TYLENOL) 325 mg tablet Comments:   Reason for Stopping:               _______________________________   Attestations: This note is prepared by Khanh Bell, acting as Scribe for [employer]. [employer]: The scribe's documentation has been prepared under my direction and personally reviewed by me in its entirety.  I confirm that the note above accurately reflects all work, treatment, procedures, and medical decision making performed by me.     _______________________________

## 2017-05-28 NOTE — DISCHARGE INSTRUCTIONS
Back Pain: Care Instructions  Your Care Instructions    Back pain has many possible causes. It is often related to problems with muscles and ligaments of the back. It may also be related to problems with the nerves, discs, or bones of the back. Moving, lifting, standing, sitting, or sleeping in an awkward way can strain the back. Sometimes you don't notice the injury until later. Arthritis is another common cause of back pain. Although it may hurt a lot, back pain usually improves on its own within several weeks. Most people recover in 12 weeks or less. Using good home treatment and being careful not to stress your back can help you feel better sooner. Follow-up care is a key part of your treatment and safety. Be sure to make and go to all appointments, and call your doctor if you are having problems. Its also a good idea to know your test results and keep a list of the medicines you take. How can you care for yourself at home? · Sit or lie in positions that are most comfortable and reduce your pain. Try one of these positions when you lie down:  ¨ Lie on your back with your knees bent and supported by large pillows. ¨ Lie on the floor with your legs on the seat of a sofa or chair. [facility] [place] on your side with your knees and hips bent and a pillow between your legs. ¨ Lie on your stomach if it does not make pain worse. · Do not sit up in bed, and avoid soft couches and twisted positions. Bed rest can help relieve pain at first, but it delays healing. Avoid bed rest after the first day of back pain. · Change positions every 30 minutes. If you must sit for long periods of time, take breaks from sitting. Get up and walk around, or lie in a comfortable position. · Try using a heating pad on a low or medium setting for 15 to 20 minutes every 2 or 3 hours. Try a warm shower in place of one session with the heating pad. · You can also try an ice pack for 10 to 15 minutes every 2 to 3 hours.  Put a thin cloth between the ice pack and your skin. · Take pain medicines exactly as directed. ¨ If the doctor gave you a prescription medicine for pain, take it as prescribed. ¨ If you are not taking a prescription pain medicine, ask your doctor if you can take an over-the-counter medicine. · Take short walks several times a day. You can start with 5 to 10 minutes, 3 or 4 times a day, and work up to longer walks. Walk on level surfaces and avoid hills and stairs until your back is better. · Return to work and other activities as soon as you can. Continued rest without activity is usually not good for your back. · To prevent future back pain, do exercises to stretch and strengthen your back and stomach. Learn how to use good posture, safe lifting techniques, and proper body mechanics. When should you call for help? Call your doctor now or seek immediate medical care if:  · You have new or worsening numbness in your legs. · You have new or worsening weakness in your legs. (This could make it hard to stand up.)  · You lose control of your bladder or bowels. Watch closely for changes in your health, and be sure to contact your doctor if:  · Your pain gets worse. · You are not getting better after 2 weeks. Where can you learn more? Go to http://joseph-khalida.info/. Enter R564 in the search box to learn more about \"Back Pain: Care Instructions. \"  Current as of: May 23, 2016  Content Version: 11.2  © 4988-6540 [employer], [employer]. Care instructions adapted under license by [employer] (which disclaims liability or warranty for this information). If you have questions about a medical condition or this instruction, always ask your healthcare professional. [address] any warranty or liability for your use of this information.

## 2017-05-28 NOTE — ED TRIAGE NOTES
C/o right hip pain that radiates down right leg that is getting worse. Seen here on 5/21/17 for came complaint, states she had f/u with her doctor since she was seen here on 5/21/17.

## 2017-06-17 ENCOUNTER — HOSPITAL ENCOUNTER (EMERGENCY)
Age: 72
Discharge: HOME | End: 2017-06-17
Attending: INTERNAL MEDICINE
Payer: MEDICARE

## 2017-06-17 VITALS
DIASTOLIC BLOOD PRESSURE: 87 MMHG | HEART RATE: 68 BPM | WEIGHT: 183 LBS | TEMPERATURE: 98.9 F | RESPIRATION RATE: 16 BRPM | BODY MASS INDEX: 31.24 KG/M2 | OXYGEN SATURATION: 100 % | SYSTOLIC BLOOD PRESSURE: 129 MMHG | HEIGHT: 64 IN

## 2017-06-17 DIAGNOSIS — M71.21 POPLITEAL CYST, RIGHT: ICD-10-CM

## 2017-06-17 DIAGNOSIS — M54.30 SCIATICA, UNSPECIFIED LATERALITY: Primary | ICD-10-CM

## 2017-06-17 DIAGNOSIS — M54.16 LUMBAR RADICULAR PAIN: ICD-10-CM

## 2017-06-17 PROCEDURE — 99282 EMERGENCY DEPT VISIT SF MDM: CPT

## 2017-06-17 PROCEDURE — 96372 THER/PROPH/DIAG INJ SC/IM: CPT

## 2017-06-17 PROCEDURE — 74011250636 HC RX REV CODE- 250/636: Performed by: INTERNAL MEDICINE

## 2017-06-17 RX ORDER — KETOROLAC TROMETHAMINE 30 MG/ML
15 INJECTION, SOLUTION INTRAMUSCULAR; INTRAVENOUS
Status: DISCONTINUED | OUTPATIENT
Start: 2017-06-17 | End: 2017-06-17 | Stop reason: CLARIF

## 2017-06-17 RX ORDER — KETOROLAC TROMETHAMINE 15 MG/ML
15 INJECTION, SOLUTION INTRAMUSCULAR; INTRAVENOUS
Status: COMPLETED | OUTPATIENT
Start: 2017-06-17 | End: 2017-06-17

## 2017-06-17 RX ORDER — HYDROMORPHONE HYDROCHLORIDE 1 MG/ML
0.5 INJECTION, SOLUTION INTRAMUSCULAR; INTRAVENOUS; SUBCUTANEOUS ONCE
Status: DISCONTINUED | OUTPATIENT
Start: 2017-06-17 | End: 2017-06-17

## 2017-06-17 RX ORDER — OXYCODONE AND ACETAMINOPHEN 5; 325 MG/1; MG/1
1 TABLET ORAL
Qty: 5 TAB | Refills: 0 | Status: SHIPPED | OUTPATIENT
Start: 2017-06-17 | End: 2017-08-30

## 2017-06-17 RX ORDER — HYDROMORPHONE HYDROCHLORIDE 1 MG/ML
0.5 INJECTION, SOLUTION INTRAMUSCULAR; INTRAVENOUS; SUBCUTANEOUS ONCE
Status: COMPLETED | OUTPATIENT
Start: 2017-06-17 | End: 2017-06-17

## 2017-06-17 RX ADMIN — HYDROMORPHONE HYDROCHLORIDE 0.5 MG: 1 INJECTION, SOLUTION INTRAMUSCULAR; INTRAVENOUS; SUBCUTANEOUS at 10:59

## 2017-06-17 RX ADMIN — KETOROLAC TROMETHAMINE 15 MG: 15 INJECTION, SOLUTION INTRAMUSCULAR; INTRAVENOUS at 10:57

## 2017-06-17 NOTE — ED PROVIDER NOTES
[address]  EMERGENCY DEPARTMENT HISTORY AND PHYSICAL EXAM       Date: 6/17/2017   Patient Name: Zulema Headley   YOB: 1945  Medical Record Number: [id number]    History of Presenting Illness     Chief Complaint   Patient presents with    Leg Pain        History Provided By:  patient    Additional History: 10:22 AM  Zulema Headley is a 80 y.o. female with Hx of arthritis who presents to the emergency department C/O pain to back to right leg that radiates into hip for the last 3-4 months. Pt was seen for similar pain 2 weeks ago. Pt denies back pain, CP, fever, chills and abd pain. Primary Care Provider: Saeed Lopez MD   Specialist:    Past History     Past Medical History:   Past Medical History:   Diagnosis Date    Asthma     Chronic obstructive pulmonary disease ([address]     Diabetes [facility]     HTN (hypertension), benign     Hyperlipidemia     Menopause     Myocardial infarction ([facility]     Neuropathy     Sciatica         Past Surgical History:   Past Surgical History:   Procedure Laterality Date    FOOT/TOES SURGERY PROC UNLISTED      HX CARPAL TUNNEL RELEASE      HX HEMORRHOIDECTOMY      HX HYSTERECTOMY      Age 40    HX KNEE REPLACEMENT          Family History:   Family History   Problem Relation Age of Onset    Breast Cancer Mother     Breast Cancer Sister         Social History:   Social History   Substance Use Topics    Smoking status: Former Smoker     Packs/day: 0.30     Years: 5.00     Quit date: 3/17/2005    Smokeless tobacco: None    Alcohol use No        Allergies: Allergies   Allergen Reactions    Ibuprofen Swelling     mouth        Review of Systems   Review of Systems   Constitutional: Negative for chills and fever. Cardiovascular: Negative for chest pain. Gastrointestinal: Negative for abdominal pain. Musculoskeletal: Positive for arthralgias and myalgias. Negative for back pain.    All other systems reviewed and are negative. Physical Exam  Vitals:    06/17/17 1011   BP: 134/82   Pulse: 82   Resp: 18   Temp: 98.9 °F (37.2 °C)   SpO2: 100%   Weight: 83 kg (183 lb)   Height: 5' 4\" (1.626 m)       Physical Exam   Constitutional: She is oriented to person, place, and time. She appears well-developed and well-nourished. HENT:   Head: Normocephalic and atraumatic. Right Ear: External ear normal.   Left Ear: External ear normal.   Nose: Nose normal.   Mouth/Throat: Oropharynx is clear and moist.   Eyes: Conjunctivae and EOM are normal. Pupils are equal, round, and reactive to light. Right eye exhibits no discharge. Left eye exhibits no discharge. No scleral icterus. Neck: Normal range of motion. Neck supple. No JVD present. No tracheal deviation present. Cardiovascular: Normal rate, regular rhythm, normal heart sounds and intact distal pulses. Pulmonary/Chest: Effort normal and breath sounds normal.   Abdominal: Soft. Bowel sounds are normal. She exhibits no distension. There is no tenderness. No HSM   Musculoskeletal: Normal range of motion. Fullness in popliteal area of right knee. No increasing warmth. Healed surgical scar on right knee. Redness to right knee. Healed lumbar surgical scar, no tenderness or step off. Right hip tenderness. Right knee 30 degree flexion. And right hip 40 degree on flexion. Neurological: She is alert and oriented to person, place, and time. She has normal reflexes. No cranial nerve deficit. She exhibits normal muscle tone. Coordination normal.   No focal motor weakness. Skin: Skin is warm and dry. No rash noted. Psychiatric: She has a normal mood and affect. Her behavior is normal.   Nursing note and vitals reviewed. Diagnostic Study Results     Labs -    No results found for this or any previous visit (from the past 12 hour(s)).     Radiologic Studies -  The following have been ordered and reviewed:  No orders to display           Medical Decision Making   I am the first provider for this patient. I reviewed the vital signs, available nursing notes, past medical history, past surgical history, family history and social history. Ddx: lumbar radiculopathy, disk herniation, strain, sprain, sciatica, or tendonitis. Doubt acute fracture or dislocation. Recent US consistent with popliteal cyst, doubt DVT    Vital Signs-Reviewed the patient's vital signs. Patient Vitals for the past 12 hrs:   Temp Pulse Resp BP SpO2   06/17/17 1011 98.9 °F (37.2 °C) 82 18 134/82 100 %     Procedures:   Procedures    ED Course:  10:22 AM  Initial assessment performed. The patients presenting problems have been discussed, and they are in agreement with the care plan formulated and outlined with them. I have encouraged them to ask questions as they arise throughout their visit. Medications Given in the ED:  Medications   ketorolac (TORADOL) injection 15 mg (not administered)   HYDROmorphone (PF) (DILAUDID) injection 0.5 mg (not administered)       Discharge Note:  10:46 AM  Pt has been reexamined. Patient has no new complaints, changes, or physical findings. Care plan outlined and precautions discussed. Results were reviewed with the patient. All medications were reviewed with the patient; will d/c home. All of pt's questions and concerns were addressed. Patient was instructed and agrees to follow up with ortho and PCP, as well as to return to the ED upon further deterioration. Patient is ready to go home. Diagnosis   Clinical Impression:     1. Sciatica, unspecified laterality    2. Lumbar radicular pain    3.  Popliteal cyst, right         Discussion:    Follow-up Information     Follow up With Details Comments Contact Info    Aleksandar Barajas MD Schedule an appointment as soon as possible for a visit in 2 days  [facility] [address] [facility]      Yisel Dudley MD Schedule an appointment as soon as possible for a visit in 2 days  [address] [facility] [address] [facility]  [phone]      THE [facility] EMERGENCY DEPT  As needed, If symptoms worsen [address] [place] [address] [phone]          Current Discharge Medication List      CONTINUE these medications which have CHANGED    Details   oxyCODONE-acetaminophen (PERCOCET) 5-325 mg per tablet Take 1 Tab by mouth every eight (8) hours as needed (for severe pain only, caution can cause drowsiness and/or constipation). Max Daily Amount: 3 Tabs. Qty: 5 Tab, Refills: 0             _______________________________   Attestations: This note is prepared by Bernadette La , acting as a Scribe for Constanza Hazel MD  on 10:19 AM on 6/17/2017 . Constanza Hazel MD: The scribe's documentation has been prepared under my direction and personally reviewed by me in its entirety.   _______________________________

## 2017-06-17 NOTE — ED TRIAGE NOTES
Pt from home with  for chief complaint of right leg pain from right hip down to right ankle x 3-4 months. Pt seen here approximately 2 weeks ago for same complaint. Pt reports the pain has not improved despite taking prescribed percocet, methylprednisolone, and naproxen. Pt described pain as sharp and shooting. Pt w/ good PVS. Pt states she has not made a follow up appointment. Sepsis Screening completed    (  )Patient meets SIRS criteria. ( X )Patient does not meet SIRS criteria.       SIRS Criteria is achieved when two or more of the following are present   Temperature < 96.8°F (36°C) or > 100.9°F (38.3°C)   Heart Rate > 90 beats per minute   Respiratory Rate > 20 breaths per minute   WBC count > 12,000 or <4,000 or > 10% bands

## 2017-06-17 NOTE — ED NOTES
Discharge instructions reviewed with the patient with opportunity for questions given. The patient verbalized understanding. Patient armband removed and shredded. Patient in stable condition. Ride at bedside.

## 2017-08-30 ENCOUNTER — APPOINTMENT (OUTPATIENT)
Dept: GENERAL RADIOLOGY | Age: 72
End: 2017-08-30
Attending: PHYSICIAN ASSISTANT
Payer: MEDICARE

## 2017-08-30 ENCOUNTER — HOSPITAL ENCOUNTER (EMERGENCY)
Age: 72
Discharge: HOME | End: 2017-08-30
Attending: EMERGENCY MEDICINE
Payer: MEDICARE

## 2017-08-30 VITALS
OXYGEN SATURATION: 99 % | SYSTOLIC BLOOD PRESSURE: 177 MMHG | TEMPERATURE: 98 F | BODY MASS INDEX: 34.49 KG/M2 | WEIGHT: 202 LBS | HEIGHT: 64 IN | RESPIRATION RATE: 16 BRPM | HEART RATE: 112 BPM | DIASTOLIC BLOOD PRESSURE: 87 MMHG

## 2017-08-30 DIAGNOSIS — M17.11 ARTHRITIS OF RIGHT KNEE: Primary | ICD-10-CM

## 2017-08-30 PROCEDURE — 74011250637 HC RX REV CODE- 250/637: Performed by: PHYSICIAN ASSISTANT

## 2017-08-30 PROCEDURE — 73564 X-RAY EXAM KNEE 4 OR MORE: CPT

## 2017-08-30 PROCEDURE — 99282 EMERGENCY DEPT VISIT SF MDM: CPT

## 2017-08-30 RX ORDER — LIDOCAINE 50 MG/G
1 PATCH TOPICAL ONCE
Status: DISCONTINUED | OUTPATIENT
Start: 2017-08-30 | End: 2017-08-30 | Stop reason: HOSPADM

## 2017-08-30 RX ORDER — OXYCODONE AND ACETAMINOPHEN 5; 325 MG/1; MG/1
1 TABLET ORAL
Qty: 20 TAB | Refills: 0 | Status: SHIPPED | OUTPATIENT
Start: 2017-08-30 | End: 2018-07-30

## 2017-08-30 RX ORDER — LIDOCAINE 50 MG/G
PATCH TOPICAL
Qty: 15 EACH | Refills: 0 | Status: SHIPPED | OUTPATIENT
Start: 2017-08-30 | End: 2018-10-12

## 2017-08-30 NOTE — ED NOTES
Care assumed for discharge. Patient armband removed and shredded. Pt given scripts x2 with discharge instructions and verbalizes understanding. Pt discharged home ambulatory with cane.

## 2017-08-30 NOTE — ED NOTES
Assumed care of patient. Patient presents complaining of right knee pain. Patient states she is scheduled for a right knee replacement and has been taking hydrocodone at home with little relief. Patient denies any injuries. Patient states she has not follow-up with orthopedic for uncontrolled pain. Patient medicated per MAR orders. Patient awaiting disposition. Call bell within reach. Will continue to monitor and assess.

## 2017-08-30 NOTE — ED PROVIDER NOTES
HPI Comments: 12:10 PM  Xin Manzano is a 80 y.o. female presenting to the ED C/O sharp right knee pain radiating up to her right hip onset 5 months ago. Pain is exacerbated when ambulating, sitting and laying down. Pt reports use of knee brace and ambulating with a cane. Pt reports use of hydrocodone, rx'ed from PCP (Dr. Angie Royal), without relief. Pt reports she is scheduled for a right knee replacement with Dr. Robinson Vital (ortho). Last saw Dr. Robinson Vital on 8/24/17. She is awaiting call for R knee replacement scheduling. Pt reports recent XR performed with Dr. Robinson Vital. PMHx includes HTN, asthma, COPD, hyperlipidemia, DM and sciatica. [place]. Pt denies new injury, and any other symptoms or complaints at this time. The history is provided by the patient. No  was used. Past Medical History:   Diagnosis Date    Asthma     Chronic obstructive pulmonary disease (HCC)     Diabetes ([address]     HTN (hypertension), benign     Hyperlipidemia     Menopause     Myocardial infarction ([address]     Neuropathy ([address]     Sciatica        Past Surgical History:   Procedure Laterality Date    FOOT/TOES SURGERY PROC UNLISTED      HX CARPAL TUNNEL RELEASE      HX HEMORRHOIDECTOMY      HX HYSTERECTOMY      Age 40    HX KNEE REPLACEMENT           Family History:   Problem Relation Age of Onset    Breast Cancer Mother     Breast Cancer Sister        Social History     Social History    Marital status:      Spouse name: N/A    Number of children: N/A    Years of education: N/A     Occupational History    Not on file.      Social History Main Topics    Smoking status: Former Smoker     Packs/day: 0.30     Years: 5.00     Quit date: 3/17/2005    Smokeless tobacco: Not on file    Alcohol use No    Drug use: No    Sexual activity: Not on file     Other Topics Concern    Not on file     Social History Narrative         ALLERGIES: Ibuprofen    Review of Systems   Constitutional: Negative for chills and fever. Cardiovascular: Negative for leg swelling. Musculoskeletal: Positive for arthralgias (right knee radiating to right hip) and gait problem. Negative for joint swelling. Skin: Negative for color change. Neurological: Negative for weakness and numbness. Vitals:    08/30/17 1146   BP: 177/87   Pulse: (!) 112   Resp: 16   Temp: 98 °F (36.7 °C)   SpO2: 99%   Weight: 91.6 kg (202 lb)   Height: 5' 4\" (1.626 m)            Physical Exam   Constitutional: She is oriented to person, place, and time. She appears well-developed and well-nourished. No distress. AA geriatric female in NAD. Alert. Sitting on edge of gurney. Appears uncomfortable at times   HENT:   Head: Normocephalic and atraumatic. Right Ear: External ear normal.   Left Ear: External ear normal.   Nose: Nose normal.   Eyes: Conjunctivae are normal.   Neck: Normal range of motion. Cardiovascular: Normal rate, regular rhythm, normal heart sounds and intact distal pulses. Exam reveals no gallop and no friction rub. No murmur heard. Pulses:       Dorsalis pedis pulses are 2+ on the right side, and 2+ on the left side. Posterior tibial pulses are 2+ on the right side, and 2+ on the left side. Pulmonary/Chest: Effort normal and breath sounds normal. No accessory muscle usage. No tachypnea. She has no decreased breath sounds. She has no rhonchi. Musculoskeletal: She exhibits no edema. Right hip: She exhibits normal range of motion, normal strength and no tenderness. Right knee: She exhibits decreased range of motion and swelling (mild). She exhibits no ecchymosis and no erythema. Tenderness found. Right upper leg: She exhibits no tenderness, no bony tenderness, no swelling and no edema. Right lower leg: She exhibits no tenderness, no bony tenderness, no swelling and no edema.         Right foot: There is normal range of motion, no tenderness, no bony tenderness, no swelling and normal capillary refill. Neurological: She is alert and oriented to person, place, and time. Skin: Skin is warm and dry. No rash noted. She is not diaphoretic. No erythema. Psychiatric: She has a normal mood and affect. Judgment normal.   Nursing note and vitals reviewed. RESULTS:    PULSE OXIMETRY NOTE:  Pulse-ox is 99% on RA. Interpretation: Normal       XR KNEE RT MIN 4 V   Final Result   Impression:     1. Small joint effusion. No fracture. 2. Mild degenerative changes particularly in the patellofemoral joint. 3. Minor patellar enthesopathy. As read by the radiologist.      12:52 PM  RADIOLOGY FINDINGS  Right Knee X-ray shows extensive degenerative changes and no acute process. Pending review by Radiologist  Recorded by Sharon Kay ED Scribe, as dictated by Ginny Salinas PA-C    Labs Reviewed - No data to display    No results found for this or any previous visit (from the past 12 hour(s)). MDM  Number of Diagnoses or Management Options  Diagnosis management comments: Known chronic OA. Hx of partial knee replacement in late 1990s. Awaiting surgery with Ortho for knee replacement. NVI. No evidence of septic joint. Doubt DVT. Will image and tx pain       Amount and/or Complexity of Data Reviewed  Tests in the radiology section of CPT®: ordered and reviewed (Right Knee XR)  Independent visualization of images, tracings, or specimens: yes (Right Knee XR)      ED Course     MEDICATIONS GIVEN:  Medications   lidocaine (LIDODERM) 5 % patch 1 Patch (1 Patch TransDERmal Apply Patch 8/30/17 1250)       Procedures    PROGRESS NOTE:  12:10 PM  Initial assessment performed. Recorded by Candelario Whyte ED Scribe, as dictated by Ginny Salinas PA-C. See MDM. Chronic OA. Awaiting surgical intervention with Ortho. Percocet for pain control. Stop norco. Reviewed risks of taking 2 narcs together. Lidoderm patch. Ortho FU. Reasons to RTED discussed with pt.  All questions answered. Pt feels comfortable going home at this time. Pt expressed understanding and she agrees with plan. Discharge Note:  1:19 PM  Debra Trujillo's results have been reviewed with her and/or her family. She has been counseled regarding her diagnosis, treatment, and plan. She verbally conveys understanding and agreement of the signs, symptoms, diagnosis, treatment and prognosis and additionally agrees to follow up as discussed. She also agrees with the care-plan and conveys that all of her questions have been answered. I have also provided discharge instructions for her that include: educational information regarding the diagnosis and treatment, and a list of reasons why She would want to return to the ED prior to her follow-up appointment, should her condition change. Proper ED utilization discussed with the patient. CLINICAL IMPRESSION    1. Arthritis of right knee        After visit plan:  D/c home    Current Discharge Medication List      START taking these medications    Details   lidocaine (LIDODERM) 5 % Apply patch to the affected area for 12 hours a day and remove for 12 hours a day. Qty: 15 Each, Refills: 0         CONTINUE these medications which have CHANGED    Details   oxyCODONE-acetaminophen (PERCOCET) 5-325 mg per tablet Take 1 Tab by mouth every four (4) hours as needed for Pain. Max Daily Amount: 6 Tabs. Do NOT take with norco  Qty: 20 Tab, Refills: 0             Follow-up Information     Follow up With Details Comments Contact Info    Yoandy Young MD Schedule an appointment as soon as possible for a visit in 2 days For orthopedic follow up. [address] [place] [address]  [phone]      THE [facility] EMERGENCY DEPT Go to As needed, If symptoms worsen [address] [address] [phone]          This note is prepared by Suha Gallegos, acting as Scribe for Colby Dillon PA-C.     Colby Dillon PA-C: The scribe's documentation has been prepared under my direction and personally reviewed by me in its entirety. I confirm that the note above accurately reflects all work, treatment, procedures, and medical decision making performed by me.

## 2017-08-30 NOTE — ED TRIAGE NOTES
Pt c/o severe right knee pain unrelieved with hydrocodone prescribed by orthopedic physician;  Supposed to be scheduled for right knee replacement by Dr Miguel Douglass;  Pt did not call office regarding medication;   No recent injury or fall

## 2017-08-30 NOTE — DISCHARGE INSTRUCTIONS

## 2017-11-13 ENCOUNTER — HOSPITAL ENCOUNTER (OUTPATIENT)
Dept: LAB | Age: 72
Discharge: HOME | End: 2017-11-13

## 2017-11-13 LAB
INR PPP: 1.9 (ref 0.8–1.2)
PROTHROMBIN TIME: 21 SEC (ref 11.5–15.2)

## 2017-11-13 PROCEDURE — 85610 PROTHROMBIN TIME: CPT | Performed by: INTERNAL MEDICINE

## 2017-11-14 ENCOUNTER — HOSPITAL ENCOUNTER (OUTPATIENT)
Dept: LAB | Age: 72
Discharge: HOME | End: 2017-11-14

## 2017-11-14 LAB
ANION GAP SERPL CALC-SCNC: 9 MMOL/L (ref 3–18)
BASOPHILS # BLD: 0 K/UL (ref 0–0.06)
BASOPHILS NFR BLD: 0 % (ref 0–2)
BUN SERPL-MCNC: 8 MG/DL (ref 7–18)
BUN/CREAT SERPL: 10 (ref 12–20)
CALCIUM SERPL-MCNC: 9.1 MG/DL (ref 8.5–10.1)
CHLORIDE SERPL-SCNC: 98 MMOL/L (ref 100–108)
CO2 SERPL-SCNC: 29 MMOL/L (ref 21–32)
CREAT SERPL-MCNC: 0.8 MG/DL (ref 0.6–1.3)
DIFFERENTIAL METHOD BLD: ABNORMAL
EOSINOPHIL # BLD: 0 K/UL (ref 0–0.4)
EOSINOPHIL NFR BLD: 0 % (ref 0–5)
ERYTHROCYTE [DISTWIDTH] IN BLOOD BY AUTOMATED COUNT: 13.9 % (ref 11.6–14.5)
GLUCOSE SERPL-MCNC: 99 MG/DL (ref 74–99)
HCT VFR BLD AUTO: 27.6 % (ref 35–45)
HGB BLD-MCNC: 9.1 G/DL (ref 12–16)
LYMPHOCYTES # BLD: 2.3 K/UL (ref 0.9–3.6)
LYMPHOCYTES NFR BLD: 30 % (ref 21–52)
MCH RBC QN AUTO: 29.4 PG (ref 24–34)
MCHC RBC AUTO-ENTMCNC: 33 G/DL (ref 31–37)
MCV RBC AUTO: 89 FL (ref 74–97)
MONOCYTES # BLD: 0.8 K/UL (ref 0.05–1.2)
MONOCYTES NFR BLD: 11 % (ref 3–10)
NEUTS SEG # BLD: 4.6 K/UL (ref 1.8–8)
NEUTS SEG NFR BLD: 59 % (ref 40–73)
PLATELET # BLD AUTO: 374 K/UL (ref 135–420)
PMV BLD AUTO: 10.3 FL (ref 9.2–11.8)
POTASSIUM SERPL-SCNC: 4.9 MMOL/L (ref 3.5–5.5)
RBC # BLD AUTO: 3.1 M/UL (ref 4.2–5.3)
SODIUM SERPL-SCNC: 136 MMOL/L (ref 136–145)
WBC # BLD AUTO: 7.8 K/UL (ref 4.6–13.2)

## 2017-11-14 PROCEDURE — 80048 BASIC METABOLIC PNL TOTAL CA: CPT | Performed by: INTERNAL MEDICINE

## 2017-11-14 PROCEDURE — 85025 COMPLETE CBC W/AUTO DIFF WBC: CPT | Performed by: INTERNAL MEDICINE

## 2017-11-16 ENCOUNTER — HOSPITAL ENCOUNTER (OUTPATIENT)
Dept: LAB | Age: 72
Discharge: HOME | End: 2017-11-16

## 2017-11-16 LAB
INR PPP: 2.1 (ref 0.8–1.2)
PROTHROMBIN TIME: 23.1 SEC (ref 11.5–15.2)

## 2017-11-16 PROCEDURE — 85610 PROTHROMBIN TIME: CPT | Performed by: INTERNAL MEDICINE

## 2017-11-18 ENCOUNTER — HOME HEALTH ADMISSION (OUTPATIENT)
Dept: HOME HEALTH SERVICES | Facility: HOME HEALTH | Age: 72
End: 2017-11-18
Payer: MEDICARE

## 2017-11-20 ENCOUNTER — HOME CARE VISIT (OUTPATIENT)
Dept: SCHEDULING | Facility: HOME HEALTH | Age: 72
End: 2017-11-20
Payer: MEDICARE

## 2017-11-20 ENCOUNTER — HOME CARE VISIT (OUTPATIENT)
Dept: HOME HEALTH SERVICES | Facility: HOME HEALTH | Age: 72
End: 2017-11-20

## 2017-11-20 VITALS
DIASTOLIC BLOOD PRESSURE: 80 MMHG | SYSTOLIC BLOOD PRESSURE: 120 MMHG | HEART RATE: 78 BPM | RESPIRATION RATE: 17 BRPM | TEMPERATURE: 98 F

## 2017-11-20 PROCEDURE — 3331090002 HH PPS REVENUE DEBIT

## 2017-11-20 PROCEDURE — G0299 HHS/HOSPICE OF RN EA 15 MIN: HCPCS

## 2017-11-20 PROCEDURE — 3331090001 HH PPS REVENUE CREDIT

## 2017-11-20 PROCEDURE — 400013 HH SOC

## 2017-11-20 PROCEDURE — G0151 HHCP-SERV OF PT,EA 15 MIN: HCPCS

## 2017-11-21 ENCOUNTER — APPOINTMENT (OUTPATIENT)
Dept: GENERAL RADIOLOGY | Age: 72
End: 2017-11-21
Attending: EMERGENCY MEDICINE
Payer: MEDICARE

## 2017-11-21 ENCOUNTER — APPOINTMENT (OUTPATIENT)
Dept: NUCLEAR MEDICINE | Age: 72
End: 2017-11-21
Attending: EMERGENCY MEDICINE
Payer: MEDICARE

## 2017-11-21 ENCOUNTER — HOSPITAL ENCOUNTER (EMERGENCY)
Age: 72
Discharge: HOME | End: 2017-11-21
Attending: EMERGENCY MEDICINE
Payer: MEDICARE

## 2017-11-21 VITALS
TEMPERATURE: 97.6 F | RESPIRATION RATE: 14 BRPM | DIASTOLIC BLOOD PRESSURE: 47 MMHG | SYSTOLIC BLOOD PRESSURE: 105 MMHG | HEART RATE: 67 BPM | OXYGEN SATURATION: 97 %

## 2017-11-21 VITALS
DIASTOLIC BLOOD PRESSURE: 77 MMHG | WEIGHT: 200 LBS | RESPIRATION RATE: 20 BRPM | OXYGEN SATURATION: 99 % | BODY MASS INDEX: 34.15 KG/M2 | HEIGHT: 64 IN | HEART RATE: 67 BPM | TEMPERATURE: 98 F | SYSTOLIC BLOOD PRESSURE: 98 MMHG

## 2017-11-21 DIAGNOSIS — R06.02 SHORTNESS OF BREATH: Primary | ICD-10-CM

## 2017-11-21 DIAGNOSIS — M71.21 BAKER'S CYST, RIGHT: ICD-10-CM

## 2017-11-21 DIAGNOSIS — D68.32 WARFARIN-INDUCED COAGULOPATHY (HCC): ICD-10-CM

## 2017-11-21 DIAGNOSIS — T45.515A WARFARIN-INDUCED COAGULOPATHY (HCC): ICD-10-CM

## 2017-11-21 LAB
ALBUMIN SERPL-MCNC: 3.3 G/DL (ref 3.4–5)
ALBUMIN/GLOB SERPL: 0.7 {RATIO} (ref 0.8–1.7)
ALP SERPL-CCNC: 98 U/L (ref 45–117)
ALT SERPL-CCNC: 20 U/L (ref 13–56)
ANION GAP SERPL CALC-SCNC: 12 MMOL/L (ref 3–18)
AST SERPL-CCNC: 32 U/L (ref 15–37)
ATRIAL RATE: 66 BPM
BASOPHILS # BLD: 0 K/UL (ref 0–0.06)
BASOPHILS NFR BLD: 0 % (ref 0–2)
BILIRUB SERPL-MCNC: 0.5 MG/DL (ref 0.2–1)
BNP SERPL-MCNC: 564 PG/ML (ref 0–900)
BUN SERPL-MCNC: 26 MG/DL (ref 7–18)
BUN/CREAT SERPL: 18 (ref 12–20)
CALCIUM SERPL-MCNC: 9.6 MG/DL (ref 8.5–10.1)
CALCULATED P AXIS, ECG09: 32 DEGREES
CALCULATED R AXIS, ECG10: 53 DEGREES
CALCULATED T AXIS, ECG11: 36 DEGREES
CHLORIDE SERPL-SCNC: 100 MMOL/L (ref 100–108)
CK MB CFR SERPL CALC: 0.7 % (ref 0–4)
CK MB SERPL-MCNC: 1.4 NG/ML (ref 5–25)
CK SERPL-CCNC: 187 U/L (ref 26–192)
CO2 SERPL-SCNC: 28 MMOL/L (ref 21–32)
CREAT SERPL-MCNC: 1.47 MG/DL (ref 0.6–1.3)
D DIMER PPP FEU-MCNC: 2.59 UG/ML(FEU)
DIAGNOSIS, 93000: NORMAL
DIFFERENTIAL METHOD BLD: ABNORMAL
EOSINOPHIL # BLD: 0 K/UL (ref 0–0.4)
EOSINOPHIL NFR BLD: 0 % (ref 0–5)
ERYTHROCYTE [DISTWIDTH] IN BLOOD BY AUTOMATED COUNT: 14 % (ref 11.6–14.5)
GLOBULIN SER CALC-MCNC: 4.5 G/DL (ref 2–4)
GLUCOSE SERPL-MCNC: 98 MG/DL (ref 74–99)
HCT VFR BLD AUTO: 29.3 % (ref 35–45)
HGB BLD-MCNC: 9.6 G/DL (ref 12–16)
INR PPP: 1.9 (ref 0.8–1.2)
LYMPHOCYTES # BLD: 2 K/UL (ref 0.9–3.6)
LYMPHOCYTES NFR BLD: 22 % (ref 21–52)
MCH RBC QN AUTO: 28.7 PG (ref 24–34)
MCHC RBC AUTO-ENTMCNC: 32.8 G/DL (ref 31–37)
MCV RBC AUTO: 87.5 FL (ref 74–97)
MONOCYTES # BLD: 0.5 K/UL (ref 0.05–1.2)
MONOCYTES NFR BLD: 6 % (ref 3–10)
NEUTS SEG # BLD: 6.2 K/UL (ref 1.8–8)
NEUTS SEG NFR BLD: 72 % (ref 40–73)
P-R INTERVAL, ECG05: 188 MS
PLATELET # BLD AUTO: 531 K/UL (ref 135–420)
PMV BLD AUTO: 9 FL (ref 9.2–11.8)
POTASSIUM SERPL-SCNC: 4.8 MMOL/L (ref 3.5–5.5)
PROT SERPL-MCNC: 7.8 G/DL (ref 6.4–8.2)
PROTHROMBIN TIME: 21.4 SEC (ref 11.5–15.2)
Q-T INTERVAL, ECG07: 428 MS
QRS DURATION, ECG06: 82 MS
QTC CALCULATION (BEZET), ECG08: 448 MS
RBC # BLD AUTO: 3.35 M/UL (ref 4.2–5.3)
SODIUM SERPL-SCNC: 140 MMOL/L (ref 136–145)
TROPONIN I SERPL-MCNC: <0.02 NG/ML (ref 0–0.06)
VENTRICULAR RATE, ECG03: 66 BPM
WBC # BLD AUTO: 8.7 K/UL (ref 4.6–13.2)

## 2017-11-21 PROCEDURE — 96360 HYDRATION IV INFUSION INIT: CPT

## 2017-11-21 PROCEDURE — 99285 EMERGENCY DEPT VISIT HI MDM: CPT

## 2017-11-21 PROCEDURE — 74011250636 HC RX REV CODE- 250/636: Performed by: EMERGENCY MEDICINE

## 2017-11-21 PROCEDURE — 3331090001 HH PPS REVENUE CREDIT

## 2017-11-21 PROCEDURE — 71010 XR CHEST PORT: CPT

## 2017-11-21 PROCEDURE — 85025 COMPLETE CBC W/AUTO DIFF WBC: CPT | Performed by: EMERGENCY MEDICINE

## 2017-11-21 PROCEDURE — 93005 ELECTROCARDIOGRAM TRACING: CPT

## 2017-11-21 PROCEDURE — 83880 ASSAY OF NATRIURETIC PEPTIDE: CPT | Performed by: EMERGENCY MEDICINE

## 2017-11-21 PROCEDURE — 85379 FIBRIN DEGRADATION QUANT: CPT | Performed by: EMERGENCY MEDICINE

## 2017-11-21 PROCEDURE — A9567 TECHNETIUM TC-99M AEROSOL: HCPCS

## 2017-11-21 PROCEDURE — 82550 ASSAY OF CK (CPK): CPT | Performed by: EMERGENCY MEDICINE

## 2017-11-21 PROCEDURE — 3331090002 HH PPS REVENUE DEBIT

## 2017-11-21 PROCEDURE — 85610 PROTHROMBIN TIME: CPT | Performed by: EMERGENCY MEDICINE

## 2017-11-21 PROCEDURE — 80053 COMPREHEN METABOLIC PANEL: CPT | Performed by: EMERGENCY MEDICINE

## 2017-11-21 PROCEDURE — 93971 EXTREMITY STUDY: CPT

## 2017-11-21 RX ADMIN — SODIUM CHLORIDE 1000 ML: 900 INJECTION, SOLUTION INTRAVENOUS at 15:39

## 2017-11-21 NOTE — DISCHARGE INSTRUCTIONS
Baker's Cyst: Care Instructions  Your Care Instructions    A Baker's cyst is a swelling behind the knee. It may cause pain or stiffness when you bend your knee or straighten it all the way. Baker's cysts are also called popliteal cysts. If you have arthritis or another condition that is the cause of the Baker's cyst, your doctor may treat that condition. A Baker's cyst may go away on its own. If not, or if it is causing a lot of discomfort, your doctor may drain the fluid that has built up behind the knee. In some cases, a Baker's cyst is removed in surgery. There are things you can do at home, such as staying off your leg, to reduce the swelling and pain. Follow-up care is a key part of your treatment and safety. Be sure to make and go to all appointments, and call your doctor if you are having problems. It's also a good idea to know your test results and keep a list of the medicines you take. How can you care for yourself at home? · Rest your knee as much as possible. · Ask your doctor if you can take an over-the-counter pain medicine, such as acetaminophen (Tylenol), ibuprofen (Advil, Motrin), or naproxen (Aleve). Be safe with medicines. Read and follow all instructions on the label. · Use a cane, a crutch, a walker, or another device if you need help to get around. These can help rest your knees. · If you have an elastic bandage, make sure it is snug but not so tight that your leg is numb, tingles, or swells below the bandage. Ask your doctor if you can loosen the bandage if it is too tight. · Follow your doctor's instructions about how much weight you can put on your knee. · Stay at a healthy weight. Being overweight puts extra strain on your knee. When should you call for help? Call 911 anytime you think you may need emergency care. For example, call if:  ? · You have chest pain, are short of breath, or you cough up blood.    ?Call your doctor now or seek immediate medical care if:  ? · You have new or worse pain. ? · Your foot is cool or pale or changes color. ? · You have tingling, weakness, or numbness in your foot or toes. ? · You have signs of a blood clot in your leg (called a deep vein thrombosis), such as:  ¨ Pain in your calf, back of the knee, thigh, or groin. ¨ Redness or swelling in your leg. ? Watch closely for changes in your health, and be sure to contact your doctor if:  ? · You do not get better as expected. Where can you learn more? Go to http://joseph-khalida.info/. Enter E464 in the search box to learn more about \"Baker's Cyst: Care Instructions. \"  Current as of: March 21, 2017  Content Version: 11.4  © 0790-1234 [employer]. Care instructions adapted under license by [employer] (which disclaims liability or warranty for this information). If you have questions about a medical condition or this instruction, always ask your healthcare professional. [place] [address] any warranty or liability for your use of this information. Shortness of Breath: Care Instructions  Your Care Instructions  Shortness of breath has many causes. Sometimes conditions such as anxiety can lead to shortness of breath. Some people get mild shortness of breath when they exercise. Trouble breathing also can be a symptom of a serious problem, such as asthma, lung disease, emphysema, heart problems, and pneumonia. If your shortness of breath continues, you may need tests and treatment. Watch for any changes in your breathing and other symptoms. Follow-up care is a key part of your treatment and safety. Be sure to make and go to all appointments, and call your doctor if you are having problems. It's also a good idea to know your test results and keep a list of the medicines you take. How can you care for yourself at home? · Do not smoke or allow others to smoke around you.  If you need help quitting, talk to your doctor about stop-smoking programs and medicines. These can increase your chances of quitting for good. · Get plenty of rest and sleep. · Take your medicines exactly as prescribed. Call your doctor if you think you are having a problem with your medicine. · Find healthy ways to deal with stress. ¨ Exercise daily. ¨ Get plenty of sleep. ¨ Eat regularly and well. When should you call for help? Call 911 anytime you think you may need emergency care. For example, call if:  ? · You have severe shortness of breath. ? · You have symptoms of a heart attack. These may include:  ¨ Chest pain or pressure, or a strange feeling in the chest.  ¨ Sweating. ¨ Shortness of breath. ¨ Nausea or vomiting. ¨ Pain, pressure, or a strange feeling in the back, neck, jaw, or upper belly or in one or both shoulders or arms. ¨ Lightheadedness or sudden weakness. ¨ A fast or irregular heartbeat. After you call 911, the  may tell you to chew 1 adult-strength or 2 to 4 low-dose aspirin. Wait for an ambulance. Do not try to drive yourself. ?Call your doctor now or seek immediate medical care if:  ? · Your shortness of breath gets worse or you start to wheeze. Wheezing is a high-pitched sound when you breathe. ? · You wake up at night out of breath or have to prop your head up on several pillows to breathe. ? · You are short of breath after only light activity or while at rest.   ? Watch closely for changes in your health, and be sure to contact your doctor if:  ? · You do not get better over the next 1 to 2 days. Where can you learn more? Go to http://joseph-khalida.info/. Enter S780 in the search box to learn more about \"Shortness of Breath: Care Instructions. \"  Current as of: May 12, 2017  Content Version: 11.4  © 3284-6343 [employer]. Care instructions adapted under license by [employer] (which disclaims liability or warranty for this information).  If you have questions about a medical condition or this instruction, always ask your healthcare professional. [place] [address] any warranty or liability for your use of this information.

## 2017-11-21 NOTE — ED NOTES
Pt resting on stretcher in room at this time - family at bedside - pt and family deny any additional needs at this time.

## 2017-11-21 NOTE — ED NOTES
Discharge instructions reviewed with patient; instructions, home care, and follow-up reviewed. Patient verbalized an understanding of discharge instructions. Pt in NAD at time of d/c. Pt with even, unlabored respirations at time of d/c. Pt d/c home, accompanied with family members.

## 2017-11-21 NOTE — ED NOTES
Pt resting in room - family at bedside - awaiting NM scan - denies pain currently - no additional needs assessed at this time.

## 2017-11-21 NOTE — ED PROVIDER NOTES
[address]  EMERGENCY DEPARTMENT HISTORY AND PHYSICAL EXAM       Date: 11/21/2017   Patient Name: Rashawn Rosas   YOB: 1945  Medical Record Number: [id number]    History of Presenting Illness     Chief Complaint   Patient presents with    Shortness of Breath    Leg Pain        History Provided By:  patient    Additional History: 12:53 PM   Rashawn Rosas is a 80 y.o. female with hx of COPD who presents to the emergency department C/O gradually worsening, constant shortness of breath s/p left TKR 1.5 weeks ago. Associated sxs include left knee pain. Pt was seen by her orthopedist this morning and was instructed to come to the ED to rule out DVT. Denies cigarette use. Denies any other sxs or complaints. Primary Care Provider: Magdi Maya MD   Specialist:    Past History     Past Medical History:   Past Medical History:   Diagnosis Date    Asthma     [facility] obstructive [facility] disease [facility] [address]     Diabetes ([facility] [address]     HTN (hypertension), benign     Hyperlipidemia     Menopause     Myocardial infarction     Neuropathy     Sciatica         Past Surgical History:   Past Surgical History:   Procedure Laterality Date    FOOT/TOES SURGERY PROC UNLISTED      HX CARPAL TUNNEL RELEASE      HX HEMORRHOIDECTOMY      HX HYSTERECTOMY      Age 40    HX KNEE REPLACEMENT          Family History:   Family History   Problem Relation Age of Onset    Breast Cancer Mother     Breast Cancer Sister         Social History:   Social History   Substance Use Topics    Smoking status: Former Smoker     Packs/day: 0.30     Years: 5.00     Quit date: 3/17/2005    Smokeless tobacco: Never Used    Alcohol use No        Allergies: Allergies   Allergen Reactions    Ibuprofen Swelling     mouth        Review of Systems   Review of Systems   Respiratory: Positive for shortness of breath. Musculoskeletal: Positive for arthralgias (left knee pain).    All other systems reviewed and are negative. Physical Exam  Vitals:    11/21/17 1312 11/21/17 1536 11/21/17 1600 11/21/17 1630   BP: 100/82 100/40 97/70 102/50   Pulse: 71 64     Resp: 20 20     Temp:       SpO2: 96% 100% 99% 98%   Weight:       Height:           Physical Exam   Constitutional: She is oriented to person, place, and time. She appears well-developed and well-nourished. HENT:   Head: Normocephalic and atraumatic. Right Ear: External ear normal.   Left Ear: External ear normal.   Nose: Nose normal.   Mouth/Throat: Oropharynx is clear and moist.   Eyes: Conjunctivae and EOM are normal. Pupils are equal, round, and reactive to light. Neck: Normal range of motion. Neck supple. No JVD present. No tracheal deviation present. Cardiovascular: Normal rate, regular rhythm, normal heart sounds and intact distal pulses. Exam reveals no gallop and no friction rub. No murmur heard. Pulmonary/Chest: Effort normal and breath sounds normal. No respiratory distress. She has no wheezes. She has no rales. Abdominal: Soft. Bowel sounds are normal. She exhibits no distension and no mass. There is no tenderness. There is no rebound and no guarding. Musculoskeletal: Normal range of motion. She exhibits no edema or deformity. Right knee: She exhibits swelling (anteriorly). Tenderness (posteriorly) found. Right anterior knee surgical incision site clean dry and intact. Healing well. Neurological: She is alert and oriented to person, place, and time. She has normal reflexes. No cranial nerve deficit. Skin: Skin is warm and dry. No rash noted. Psychiatric: She has a normal mood and affect. Her behavior is normal.   Nursing note and vitals reviewed.       Diagnostic Study Results     Labs -      Recent Results (from the past 12 hour(s))   EKG, 12 LEAD, INITIAL    Collection Time: 11/21/17  2:05 PM   Result Value Ref Range    Ventricular Rate 66 BPM    Atrial Rate 66 BPM    P-R Interval 188 ms    QRS Duration 82 ms    Q-T Interval 428 ms    QTC Calculation (Bezet) 448 ms    Calculated P Axis 32 degrees    Calculated R Axis 53 degrees    Calculated T Axis 36 degrees    Diagnosis       Normal sinus rhythm  Low voltage QRS  Borderline ECG  When compared with ECG of 22-FEB-2017 08:16,  Criteria for Septal infarct are no longer present     CBC WITH AUTOMATED DIFF    Collection Time: 11/21/17  2:25 PM   Result Value Ref Range    WBC 8.7 4.6 - 13.2 K/uL    RBC 3.35 (L) 4.20 - 5.30 M/uL    HGB 9.6 (L) 12.0 - 16.0 g/dL    HCT 29.3 (L) 35.0 - 45.0 %    MCV 87.5 74.0 - 97.0 FL    MCH 28.7 24.0 - 34.0 PG    MCHC 32.8 31.0 - 37.0 g/dL    RDW 14.0 11.6 - 14.5 %    PLATELET 466 (H) 200 - 420 K/uL    MPV 9.0 (L) 9.2 - 11.8 FL    NEUTROPHILS 72 40 - 73 %    LYMPHOCYTES 22 21 - 52 %    MONOCYTES 6 3 - 10 %    EOSINOPHILS 0 0 - 5 %    BASOPHILS 0 0 - 2 %    ABS. NEUTROPHILS 6.2 1.8 - 8.0 K/UL    ABS. LYMPHOCYTES 2.0 0.9 - 3.6 K/UL    ABS. MONOCYTES 0.5 0.05 - 1.2 K/UL    ABS. EOSINOPHILS 0.0 0.0 - 0.4 K/UL    ABS. BASOPHILS 0.0 0.0 - 0.06 K/UL    DF AUTOMATED     METABOLIC PANEL, COMPREHENSIVE    Collection Time: 11/21/17  2:25 PM   Result Value Ref Range    Sodium 140 136 - 145 mmol/L    Potassium 4.8 3.5 - 5.5 mmol/L    Chloride 100 100 - 108 mmol/L    CO2 28 21 - 32 mmol/L    Anion gap 12 3.0 - 18 mmol/L    Glucose 98 74 - 99 mg/dL    BUN 26 (H) 7.0 - 18 MG/DL    Creatinine 1.47 (H) 0.6 - 1.3 MG/DL    BUN/Creatinine ratio 18 12 - 20      GFR est AA 43 (L) >60 ml/min/1.73m2    GFR est non-AA 35 (L) >60 ml/min/1.73m2    Calcium 9.6 8.5 - 10.1 MG/DL    Bilirubin, total 0.5 0.2 - 1.0 MG/DL    ALT (SGPT) 20 13 - 56 U/L    AST (SGOT) 32 15 - 37 U/L    Alk.  phosphatase 98 45 - 117 U/L    Protein, total 7.8 6.4 - 8.2 g/dL    Albumin 3.3 (L) 3.4 - 5.0 g/dL    Globulin 4.5 (H) 2.0 - 4.0 g/dL    A-G Ratio 0.7 (L) 0.8 - 1.7     CARDIAC PANEL,(CK, CKMB & TROPONIN)    Collection Time: 11/21/17  2:25 PM   Result Value Ref Range     26 - 192 U/L    CK - MB 1. 4 <3.6 ng/ml    CK-MB Index 0.7 0.0 - 4.0 %    Troponin-I, Qt. <0.02 0.00 - 0.06 NG/ML   NT-PRO BNP    Collection Time: 11/21/17  2:25 PM   Result Value Ref Range    NT pro- 0 - 900 PG/ML   D DIMER    Collection Time: 11/21/17  2:25 PM   Result Value Ref Range    D DIMER 2.59 (H) <0.46 ug/ml(FEU)   PROTHROMBIN TIME + INR    Collection Time: 11/21/17  2:25 PM   Result Value Ref Range    Prothrombin time 21.4 (H) 11.5 - 15.2 sec    INR 1.9 (H) 0.8 - 1.2         Radiologic Studies -  The following have been ordered and reviewed:    NM LUNG PERFUSION W VENT   Final Result   IMPRESSION: Low probability scan for pulmonary embolism. As read by the radiologist.    Amanda Gale EXT VENOUS RIGHT   Final Result   INTERPRETATION/FINDINGS  Duplex images were obtained using 2-D gray scale, color flow, and  spectral Doppler analysis.     Right leg :  1. Deep vein(s) visualized include the common femoral, deep femoral,  proximal femoral, mid femoral, distal femoral, popliteal(above knee),  popliteal(fossa), popliteal(below knee), posterior tibial and peroneal   veins. 2. No evidence of deep venous thrombosis detected in the veins  visualized. 3. No evidence of deep vein thrombosis in the contralateral common  femoral vein. 4. Superficial vein(s) visualized include the great saphenous vein. 5. No evidence of superficial thrombosis detected. 6. Savage's cyst noted measuring 4.0 x 3.4 x 1.1 cm  As read by the radiologist.    XR CHEST PORT   Final Result   IMPRESSION:     New bandlike opacities at the left base favoring atelectasis. Otherwise, no  acute cardiopulmonary disease. As read by the radiologist.         Medical Decision Making   I am the first provider for this patient. I reviewed the vital signs, available nursing notes, past medical history, past surgical history, family history and social history. Vital Signs-Reviewed the patient's vital signs.    Patient Vitals for the past 12 hrs:   Temp Pulse Resp BP SpO2   11/21/17 1630 - - - 102/50 98 %   11/21/17 1600 - - - 97/70 99 %   11/21/17 1536 - 64 20 100/40 100 %   11/21/17 1312 - 71 20 100/82 96 %   11/21/17 1157 98 °F (36.7 °C) 68 20 96/45 100 %       Pulse Oximetry Analysis - Normal 100% on room air     Cardiac Monitor:   Rate: 68 bpm   Rhythm: Normal Sinus Rhythm     EKG interpretation: (Preliminary)  2:05 PM   NSR. Rate 66 bpm. No acute changes. EKG read by Jerel Blanton MD at 2:10 PM     Old Medical Records: Old medical records. Nursing notes. Provider Notes:   INITIAL CLINICAL IMPRESSION and PLANS:  The patient presents with the primary complaint(s) of: shortness of breath and leg pain. The presentation, to include historical aspects and clinical findings are consistent with the DX of PE. However, other possible DX's to consider as primary, associated with, or exacerbated by include:    1. CHF  2. ACS  3. Pneumonia  4. COPD    Considering the above, my initial management plan to evaluate and therapeutic interventions include the following and as noted in the orders:    1. Labs: D-dimer, NT-pro BNP, Cardiac Panel, CMP, UA, CBC  2. Imaging: Lower extremity venous duplex, Chest X-ray, EKG    Procedures:   Procedures    ED Course:  12:53 PM  Initial assessment performed. The patients presenting problems have been discussed, and they are in agreement with the care plan formulated and outlined with them. I have encouraged them to ask questions as they arise throughout their visit. Medications Given in the ED:  Medications   sodium chloride 0.9 % bolus infusion 1,000 mL (1,000 mL IntraVENous New Bag 11/21/17 1539)   technetium albumin aggregated (MAA) solution 6 millicurie (6 millicuries IntraVENous Given 11/21/17 1643)   technetium pentetate (DTPA) aerosol 33 millicurie (33 millicuries Inhalation Given 11/21/17 1643)       Discharge Note:  5:35 PM   Pt has been reexamined. Patient has no new complaints, changes, or physical findings.   Care plan outlined and precautions discussed. Results were reviewed with the patient. All of pt's questions and concerns were addressed. Patient was instructed and agrees to follow up with her PCP, as well as to return to the ED upon further deterioration. Patient is ready to go home. Diagnosis   Clinical Impression:   1. Shortness of breath    2. Baker's cyst, right    3. Warfarin-induced coagulopathy ([address]         Discussion:  Patient presents with complaints of right leg pain and shortness of breath. Patient had recent right knee surgery 1.5 weeks ago. Her shortness of breath is worse with exertion without complaints of chest pain. Patient has known COPD. Patient was stable in the ED without normal respirations and adequate oxygenation. ECG and Troponin showed no evidence of ACS. D-dimer was elevated. V/Q scan showed low probability of PE. She was unable to have a CTA chest due to elevated creatinine. RLE duplex doppler showed Bakers cyst, no DVT. Patient is on Warfarin, INR is elevated at 1.9. Patient shows no evidence of CHF. Patient will follow up with her PCP for further evaluation. Follow-up Information     Follow up With Details Comments Contact Info    Milan Scheuermann, MD Schedule an appointment as soon as possible for a visit in 2 days For primary care follow up [address]  [phone]      [facility] EMERGENCY DEPT  As needed, If symptoms worsen [address] [place]  [phone]          Current Discharge Medication List          _______________________________   Attestations: This note is prepared by Natasha Kimball, acting as a Scribe for Ramona Lee MD on 12:26 PM on 11/21/2017 . Ramona Lee MD: The scribe's documentation has been prepared under my direction and personally reviewed by me in its entirety.   _______________________________

## 2017-11-21 NOTE — ED TRIAGE NOTES
Patient had a total knee replacement last week and has been having shortness of breath. Patient was seen by Dr. Choi Ruvish this am and was sent her to rule out DVT and evaluate shortness of breath. Sepsis Screening completed    (  )Patient meets SIRS criteria. (x  )Patient does not meet SIRS criteria.       SIRS Criteria is achieved when two or more of the following are present   Temperature < 96.8°F (36°C) or > 100.9°F (38.3°C)   Heart Rate > 90 beats per minute   Respiratory Rate > 20 breaths per minute   WBC count > 12,000 or <4,000 or > 10% bands

## 2017-11-21 NOTE — PROCEDURES
[facility]  *** FINAL REPORT ***    Name: Gabriela Reeves  MRN: [id number]    Outpatient  : 22 Dec 1945  HIS Order #: [id number]  [address] [facility] Visit #: [id number]  Date: 2017    TYPE OF TEST: Peripheral Venous Testing    REASON FOR TEST  Pain in limb, Limb swelling    Right Leg:-  Deep venous thrombosis:           No  Superficial venous thrombosis:    No  Deep venous insufficiency:        Not examined  Superficial venous insufficiency: Not examined      INTERPRETATION/FINDINGS  Duplex images were obtained using 2-D gray scale, color flow, and  spectral Doppler analysis. Right leg :  1. Deep vein(s) visualized include the common femoral, deep femoral,  proximal femoral, mid femoral, distal femoral, popliteal(above knee),  popliteal(fossa), popliteal(below knee), posterior tibial and peroneal   veins. 2. No evidence of deep venous thrombosis detected in the veins  visualized. 3. No evidence of deep vein thrombosis in the contralateral common  femoral vein. 4. Superficial vein(s) visualized include the great saphenous vein. 5. No evidence of superficial thrombosis detected. 6. Savage's cyst noted measuring 4.0 x 3.4 x 1.1 cm    ADDITIONAL COMMENTS    I have personally reviewed the data relevant to the interpretation of  this  study. TECHNOLOGIST: Monica Rodriguez  Signed: 2017 12:40 PM    PHYSICIAN: Javon Cameron MD  Signed: 2017 02:38 PM

## 2017-11-22 ENCOUNTER — HOME CARE VISIT (OUTPATIENT)
Dept: SCHEDULING | Facility: HOME HEALTH | Age: 72
End: 2017-11-22
Payer: MEDICARE

## 2017-11-22 PROCEDURE — 3331090001 HH PPS REVENUE CREDIT

## 2017-11-22 PROCEDURE — G0152 HHCP-SERV OF OT,EA 15 MIN: HCPCS

## 2017-11-22 PROCEDURE — 3331090002 HH PPS REVENUE DEBIT

## 2017-11-22 PROCEDURE — G0151 HHCP-SERV OF PT,EA 15 MIN: HCPCS

## 2017-11-23 ENCOUNTER — HOME CARE VISIT (OUTPATIENT)
Dept: SCHEDULING | Facility: HOME HEALTH | Age: 72
End: 2017-11-23
Payer: MEDICARE

## 2017-11-23 PROCEDURE — 3331090002 HH PPS REVENUE DEBIT

## 2017-11-23 PROCEDURE — 3331090001 HH PPS REVENUE CREDIT

## 2017-11-23 PROCEDURE — G0495 RN CARE TRAIN/EDU IN HH: HCPCS

## 2017-11-24 ENCOUNTER — HOME CARE VISIT (OUTPATIENT)
Dept: SCHEDULING | Facility: HOME HEALTH | Age: 72
End: 2017-11-24
Payer: MEDICARE

## 2017-11-24 VITALS
OXYGEN SATURATION: 97 % | HEART RATE: 85 BPM | DIASTOLIC BLOOD PRESSURE: 70 MMHG | RESPIRATION RATE: 14 BRPM | SYSTOLIC BLOOD PRESSURE: 100 MMHG | TEMPERATURE: 96.4 F

## 2017-11-24 LAB
INR BLD: 2.2 (ref 0.9–1.1)
PT POC: 26.3 SECONDS (ref 11.8–14.9)

## 2017-11-24 PROCEDURE — G0158 HHC OT ASSISTANT EA 15: HCPCS

## 2017-11-24 PROCEDURE — 3331090001 HH PPS REVENUE CREDIT

## 2017-11-24 PROCEDURE — 3331090002 HH PPS REVENUE DEBIT

## 2017-11-25 ENCOUNTER — HOME CARE VISIT (OUTPATIENT)
Dept: SCHEDULING | Facility: HOME HEALTH | Age: 72
End: 2017-11-25
Payer: MEDICARE

## 2017-11-25 VITALS — OXYGEN SATURATION: 98 % | DIASTOLIC BLOOD PRESSURE: 65 MMHG | HEART RATE: 72 BPM | SYSTOLIC BLOOD PRESSURE: 135 MMHG

## 2017-11-25 PROCEDURE — G0157 HHC PT ASSISTANT EA 15: HCPCS

## 2017-11-25 PROCEDURE — 3331090001 HH PPS REVENUE CREDIT

## 2017-11-25 PROCEDURE — 3331090002 HH PPS REVENUE DEBIT

## 2017-11-26 PROCEDURE — 3331090002 HH PPS REVENUE DEBIT

## 2017-11-26 PROCEDURE — 3331090001 HH PPS REVENUE CREDIT

## 2017-11-27 ENCOUNTER — HOME CARE VISIT (OUTPATIENT)
Dept: SCHEDULING | Facility: HOME HEALTH | Age: 72
End: 2017-11-27
Payer: MEDICARE

## 2017-11-27 PROCEDURE — G0157 HHC PT ASSISTANT EA 15: HCPCS

## 2017-11-27 PROCEDURE — 3331090001 HH PPS REVENUE CREDIT

## 2017-11-27 PROCEDURE — 3331090002 HH PPS REVENUE DEBIT

## 2017-11-27 PROCEDURE — G0299 HHS/HOSPICE OF RN EA 15 MIN: HCPCS

## 2017-11-28 ENCOUNTER — HOME CARE VISIT (OUTPATIENT)
Dept: SCHEDULING | Facility: HOME HEALTH | Age: 72
End: 2017-11-28
Payer: MEDICARE

## 2017-11-28 ENCOUNTER — HOME CARE VISIT (OUTPATIENT)
Dept: HOME HEALTH SERVICES | Facility: HOME HEALTH | Age: 72
End: 2017-11-28
Payer: MEDICARE

## 2017-11-28 VITALS
HEART RATE: 75 BPM | OXYGEN SATURATION: 98 % | RESPIRATION RATE: 16 BRPM | SYSTOLIC BLOOD PRESSURE: 101 MMHG | DIASTOLIC BLOOD PRESSURE: 70 MMHG | TEMPERATURE: 97.1 F

## 2017-11-28 PROCEDURE — G0153 HHCP-SVS OF S/L PATH,EA 15MN: HCPCS

## 2017-11-28 PROCEDURE — 3331090001 HH PPS REVENUE CREDIT

## 2017-11-28 PROCEDURE — 3331090002 HH PPS REVENUE DEBIT

## 2017-11-28 PROCEDURE — G0152 HHCP-SERV OF OT,EA 15 MIN: HCPCS

## 2017-11-29 ENCOUNTER — HOME CARE VISIT (OUTPATIENT)
Dept: SCHEDULING | Facility: HOME HEALTH | Age: 72
End: 2017-11-29
Payer: MEDICARE

## 2017-11-29 VITALS — OXYGEN SATURATION: 98 % | DIASTOLIC BLOOD PRESSURE: 65 MMHG | SYSTOLIC BLOOD PRESSURE: 145 MMHG | HEART RATE: 82 BPM

## 2017-11-29 PROCEDURE — 3331090002 HH PPS REVENUE DEBIT

## 2017-11-29 PROCEDURE — 3331090001 HH PPS REVENUE CREDIT

## 2017-11-29 PROCEDURE — G0157 HHC PT ASSISTANT EA 15: HCPCS

## 2017-11-30 ENCOUNTER — HOME CARE VISIT (OUTPATIENT)
Dept: SCHEDULING | Facility: HOME HEALTH | Age: 72
End: 2017-11-30
Payer: MEDICARE

## 2017-11-30 ENCOUNTER — HOME CARE VISIT (OUTPATIENT)
Dept: HOME HEALTH SERVICES | Facility: HOME HEALTH | Age: 72
End: 2017-11-30
Payer: MEDICARE

## 2017-11-30 VITALS
HEART RATE: 81 BPM | OXYGEN SATURATION: 98 % | DIASTOLIC BLOOD PRESSURE: 63 MMHG | SYSTOLIC BLOOD PRESSURE: 107 MMHG | RESPIRATION RATE: 16 BRPM | TEMPERATURE: 97.6 F

## 2017-11-30 VITALS — OXYGEN SATURATION: 96 % | SYSTOLIC BLOOD PRESSURE: 140 MMHG | DIASTOLIC BLOOD PRESSURE: 70 MMHG | HEART RATE: 78 BPM

## 2017-11-30 LAB
INR BLD: 1.7 (ref 0.9–1.1)
PT POC: 20.8 SECONDS (ref 11.8–14.9)

## 2017-11-30 PROCEDURE — G0299 HHS/HOSPICE OF RN EA 15 MIN: HCPCS

## 2017-11-30 PROCEDURE — G0157 HHC PT ASSISTANT EA 15: HCPCS

## 2017-11-30 PROCEDURE — 3331090001 HH PPS REVENUE CREDIT

## 2017-11-30 PROCEDURE — G0152 HHCP-SERV OF OT,EA 15 MIN: HCPCS

## 2017-11-30 PROCEDURE — 3331090002 HH PPS REVENUE DEBIT

## 2017-12-01 PROCEDURE — 3331090002 HH PPS REVENUE DEBIT

## 2017-12-01 PROCEDURE — 3331090001 HH PPS REVENUE CREDIT

## 2017-12-02 PROCEDURE — 3331090002 HH PPS REVENUE DEBIT

## 2017-12-02 PROCEDURE — 3331090001 HH PPS REVENUE CREDIT

## 2017-12-03 PROCEDURE — 3331090002 HH PPS REVENUE DEBIT

## 2017-12-03 PROCEDURE — 3331090001 HH PPS REVENUE CREDIT

## 2017-12-04 PROCEDURE — 3331090002 HH PPS REVENUE DEBIT

## 2017-12-04 PROCEDURE — 3331090001 HH PPS REVENUE CREDIT

## 2017-12-05 ENCOUNTER — HOME CARE VISIT (OUTPATIENT)
Dept: HOME HEALTH SERVICES | Facility: HOME HEALTH | Age: 72
End: 2017-12-05
Payer: MEDICARE

## 2017-12-05 ENCOUNTER — HOME CARE VISIT (OUTPATIENT)
Dept: SCHEDULING | Facility: HOME HEALTH | Age: 72
End: 2017-12-05
Payer: MEDICARE

## 2017-12-05 PROCEDURE — 3331090002 HH PPS REVENUE DEBIT

## 2017-12-05 PROCEDURE — G0151 HHCP-SERV OF PT,EA 15 MIN: HCPCS

## 2017-12-05 PROCEDURE — 3331090001 HH PPS REVENUE CREDIT

## 2017-12-06 PROCEDURE — 3331090001 HH PPS REVENUE CREDIT

## 2017-12-06 PROCEDURE — 3331090002 HH PPS REVENUE DEBIT

## 2017-12-07 PROCEDURE — 3331090002 HH PPS REVENUE DEBIT

## 2017-12-07 PROCEDURE — 3331090001 HH PPS REVENUE CREDIT

## 2017-12-08 PROCEDURE — 3331090002 HH PPS REVENUE DEBIT

## 2017-12-08 PROCEDURE — 3331090001 HH PPS REVENUE CREDIT

## 2017-12-09 PROCEDURE — 3331090001 HH PPS REVENUE CREDIT

## 2017-12-09 PROCEDURE — 3331090002 HH PPS REVENUE DEBIT

## 2017-12-10 PROCEDURE — 3331090002 HH PPS REVENUE DEBIT

## 2017-12-10 PROCEDURE — 3331090001 HH PPS REVENUE CREDIT

## 2017-12-11 PROCEDURE — 3331090002 HH PPS REVENUE DEBIT

## 2017-12-11 PROCEDURE — 3331090001 HH PPS REVENUE CREDIT

## 2017-12-12 PROCEDURE — 3331090002 HH PPS REVENUE DEBIT

## 2017-12-12 PROCEDURE — 3331090001 HH PPS REVENUE CREDIT

## 2018-05-02 ENCOUNTER — HOSPITAL ENCOUNTER (OUTPATIENT)
Dept: LAB | Age: 73
Discharge: HOME | End: 2018-05-02
Payer: MEDICARE

## 2018-05-02 LAB
EST. AVERAGE GLUCOSE BLD GHB EST-MCNC: 189 MG/DL
HBA1C MFR BLD: 8.2 % (ref 4.5–5.6)

## 2018-05-02 PROCEDURE — 86803 HEPATITIS C AB TEST: CPT | Performed by: FAMILY MEDICINE

## 2018-05-02 PROCEDURE — 83036 HEMOGLOBIN GLYCOSYLATED A1C: CPT | Performed by: FAMILY MEDICINE

## 2018-05-02 PROCEDURE — 36415 COLL VENOUS BLD VENIPUNCTURE: CPT | Performed by: FAMILY MEDICINE

## 2018-05-03 LAB
HCV AB SER IA-ACNC: 0.02 INDEX
HCV AB SERPL QL IA: NEGATIVE
HCV COMMENT,HCGAC: NORMAL

## 2018-05-07 LAB
FAX TO INFO,FAXT: NORMAL
FAX TO NUMBER,FAXN: NORMAL

## 2018-07-23 ENCOUNTER — APPOINTMENT (OUTPATIENT)
Dept: CT IMAGING | Age: 73
End: 2018-07-23
Attending: PHYSICIAN ASSISTANT
Payer: MEDICARE

## 2018-07-23 ENCOUNTER — APPOINTMENT (OUTPATIENT)
Dept: GENERAL RADIOLOGY | Age: 73
End: 2018-07-23
Attending: PHYSICIAN ASSISTANT
Payer: MEDICARE

## 2018-07-23 ENCOUNTER — HOSPITAL ENCOUNTER (EMERGENCY)
Age: 73
Discharge: HOME | End: 2018-07-23
Attending: EMERGENCY MEDICINE
Payer: MEDICARE

## 2018-07-23 VITALS
HEART RATE: 68 BPM | RESPIRATION RATE: 16 BRPM | HEIGHT: 60 IN | BODY MASS INDEX: 39.27 KG/M2 | DIASTOLIC BLOOD PRESSURE: 78 MMHG | WEIGHT: 200 LBS | SYSTOLIC BLOOD PRESSURE: 167 MMHG | OXYGEN SATURATION: 99 % | TEMPERATURE: 98.4 F

## 2018-07-23 DIAGNOSIS — R07.89 ATYPICAL CHEST PAIN: ICD-10-CM

## 2018-07-23 DIAGNOSIS — R91.1 LUNG NODULE: ICD-10-CM

## 2018-07-23 DIAGNOSIS — R79.1 SUBTHERAPEUTIC INTERNATIONAL NORMALIZED RATIO (INR): Primary | ICD-10-CM

## 2018-07-23 DIAGNOSIS — L29.9 ITCHING: ICD-10-CM

## 2018-07-23 LAB
ALBUMIN SERPL-MCNC: 3.7 G/DL (ref 3.4–5)
ALBUMIN/GLOB SERPL: 0.8 {RATIO} (ref 0.8–1.7)
ALP SERPL-CCNC: 81 U/L (ref 45–117)
ALT SERPL-CCNC: 33 U/L (ref 13–56)
ANION GAP SERPL CALC-SCNC: 11 MMOL/L (ref 3–18)
AST SERPL-CCNC: 31 U/L (ref 15–37)
BASOPHILS # BLD: 0 K/UL (ref 0–0.1)
BASOPHILS NFR BLD: 1 % (ref 0–2)
BILIRUB SERPL-MCNC: 0.4 MG/DL (ref 0.2–1)
BNP SERPL-MCNC: 353 PG/ML (ref 0–900)
BUN SERPL-MCNC: 10 MG/DL (ref 7–18)
BUN/CREAT SERPL: 10 (ref 12–20)
CALCIUM SERPL-MCNC: 9.2 MG/DL (ref 8.5–10.1)
CHLORIDE SERPL-SCNC: 100 MMOL/L (ref 100–108)
CK MB CFR SERPL CALC: 1.4 % (ref 0–4)
CK MB SERPL-MCNC: 1.5 NG/ML (ref 5–25)
CK SERPL-CCNC: 107 U/L (ref 26–192)
CO2 SERPL-SCNC: 29 MMOL/L (ref 21–32)
CREAT SERPL-MCNC: 1 MG/DL (ref 0.6–1.3)
D DIMER PPP FEU-MCNC: 0.31 UG/ML(FEU)
DIFFERENTIAL METHOD BLD: ABNORMAL
EOSINOPHIL # BLD: 0 K/UL (ref 0–0.4)
EOSINOPHIL NFR BLD: 0 % (ref 0–5)
ERYTHROCYTE [DISTWIDTH] IN BLOOD BY AUTOMATED COUNT: 16.2 % (ref 11.6–14.5)
GLOBULIN SER CALC-MCNC: 4.4 G/DL (ref 2–4)
GLUCOSE SERPL-MCNC: 128 MG/DL (ref 74–99)
HCT VFR BLD AUTO: 35.9 % (ref 35–45)
HGB BLD-MCNC: 11.7 G/DL (ref 12–16)
INR PPP: 1 (ref 0.8–1.2)
LYMPHOCYTES # BLD: 1.9 K/UL (ref 0.9–3.6)
LYMPHOCYTES NFR BLD: 44 % (ref 21–52)
MCH RBC QN AUTO: 28.2 PG (ref 24–34)
MCHC RBC AUTO-ENTMCNC: 32.6 G/DL (ref 31–37)
MCV RBC AUTO: 86.5 FL (ref 74–97)
MONOCYTES # BLD: 0.3 K/UL (ref 0.05–1.2)
MONOCYTES NFR BLD: 8 % (ref 3–10)
NEUTS SEG # BLD: 2.1 K/UL (ref 1.8–8)
NEUTS SEG NFR BLD: 47 % (ref 40–73)
PLATELET # BLD AUTO: 275 K/UL (ref 135–420)
PMV BLD AUTO: 10.6 FL (ref 9.2–11.8)
POTASSIUM SERPL-SCNC: 3.5 MMOL/L (ref 3.5–5.5)
PROT SERPL-MCNC: 8.1 G/DL (ref 6.4–8.2)
PROTHROMBIN TIME: 12.4 SEC (ref 11.5–15.2)
RBC # BLD AUTO: 4.15 M/UL (ref 4.2–5.3)
SODIUM SERPL-SCNC: 140 MMOL/L (ref 136–145)
TROPONIN I SERPL-MCNC: <0.02 NG/ML (ref 0–0.06)
WBC # BLD AUTO: 4.4 K/UL (ref 4.6–13.2)

## 2018-07-23 PROCEDURE — 74011250637 HC RX REV CODE- 250/637: Performed by: PHYSICIAN ASSISTANT

## 2018-07-23 PROCEDURE — 85610 PROTHROMBIN TIME: CPT | Performed by: PHYSICIAN ASSISTANT

## 2018-07-23 PROCEDURE — 70450 CT HEAD/BRAIN W/O DYE: CPT

## 2018-07-23 PROCEDURE — 93005 ELECTROCARDIOGRAM TRACING: CPT

## 2018-07-23 PROCEDURE — 85025 COMPLETE CBC W/AUTO DIFF WBC: CPT | Performed by: PHYSICIAN ASSISTANT

## 2018-07-23 PROCEDURE — 99284 EMERGENCY DEPT VISIT MOD MDM: CPT

## 2018-07-23 PROCEDURE — 85379 FIBRIN DEGRADATION QUANT: CPT | Performed by: PHYSICIAN ASSISTANT

## 2018-07-23 PROCEDURE — 71046 X-RAY EXAM CHEST 2 VIEWS: CPT

## 2018-07-23 PROCEDURE — 83880 ASSAY OF NATRIURETIC PEPTIDE: CPT | Performed by: PHYSICIAN ASSISTANT

## 2018-07-23 PROCEDURE — 80053 COMPREHEN METABOLIC PANEL: CPT | Performed by: PHYSICIAN ASSISTANT

## 2018-07-23 PROCEDURE — 82553 CREATINE MB FRACTION: CPT | Performed by: PHYSICIAN ASSISTANT

## 2018-07-23 RX ORDER — DIPHENHYDRAMINE HCL 25 MG
25 CAPSULE ORAL
Qty: 20 CAP | Refills: 0 | Status: SHIPPED | OUTPATIENT
Start: 2018-07-23 | End: 2018-08-02

## 2018-07-23 RX ORDER — FAMOTIDINE 20 MG/1
20 TABLET, FILM COATED ORAL 2 TIMES DAILY
Qty: 20 TAB | Refills: 0 | Status: SHIPPED | OUTPATIENT
Start: 2018-07-23 | End: 2018-08-02

## 2018-07-23 RX ORDER — GUAIFENESIN 100 MG/5ML
243 LIQUID (ML) ORAL
Status: COMPLETED | OUTPATIENT
Start: 2018-07-23 | End: 2018-07-23

## 2018-07-23 RX ADMIN — ASPIRIN 81 MG 243 MG: 81 TABLET ORAL at 14:44

## 2018-07-23 NOTE — ED TRIAGE NOTES
Patient with complaints of chest pressure for 2 days. Patient also states she has been having itching all over her body.

## 2018-07-23 NOTE — DISCHARGE INSTRUCTIONS
Chest Pain: Care Instructions  Your Care Instructions    There are many things that can cause chest pain. Some are not serious and will get better on their own in a few days. But some kinds of chest pain need more testing and treatment. Your doctor may have recommended a follow-up visit in the next 8 to 12 hours. If you are not getting better, you may need more tests or treatment. Even though your doctor has released you, you still need to watch for any problems. The doctor carefully checked you, but sometimes problems can develop later. If you have new symptoms or if your symptoms do not get better, get medical care right away. If you have worse or different chest pain or pressure that lasts more than 5 minutes or you passed out (lost consciousness), call 911 or seek other emergency help right away. A medical visit is only one step in your treatment. Even if you feel better, you still need to do what your doctor recommends, such as going to all suggested follow-up appointments and taking medicines exactly as directed. This will help you recover and help prevent future problems. How can you care for yourself at home? · Rest until you feel better. · Take your medicine exactly as prescribed. Call your doctor if you think you are having a problem with your medicine. · Do not drive after taking a prescription pain medicine. When should you call for help? Call 911 if:    · You passed out (lost consciousness).     · You have severe difficulty breathing.     · You have symptoms of a heart attack. These may include:  ¨ Chest pain or pressure, or a strange feeling in your chest.  ¨ Sweating. ¨ Shortness of breath. ¨ Nausea or vomiting. ¨ Pain, pressure, or a strange feeling in your back, neck, jaw, or upper belly or in one or both shoulders or arms. ¨ Lightheadedness or sudden weakness. ¨ A fast or irregular heartbeat.   After you call 911, the  may tell you to chew 1 adult-strength or 2 to 4 low-dose aspirin. Wait for an ambulance. Do not try to drive yourself.    Call your doctor today if:    · You have any trouble breathing.     · Your chest pain gets worse.     · You are dizzy or lightheaded, or you feel like you may faint.     · You are not getting better as expected.     · You are having new or different chest pain. Where can you learn more? Go to http://joseph-khalida.info/. Enter A120 in the search box to learn more about \"Chest Pain: Care Instructions. \"  Current as of: November 20, 2017  Content Version: 11.7  © 5805-7493 [employer]. Care instructions adapted under license by [employer] (which disclaims liability or warranty for this information). If you have questions about a medical condition or this instruction, always ask your healthcare professional. [address] any warranty or liability for your use of this information.

## 2018-07-23 NOTE — ED PROVIDER NOTES
EMERGENCY DEPARTMENT HISTORY AND PHYSICAL EXAM    Date: 7/23/2018  Patient Name: Hannah Werner    History of Presenting Illness     Chief Complaint   Patient presents with    Chest Pain    Skin Problem         History Provided By: Patient    Chief Complaint: pruritis and chest pressure  Duration: 2 Days  Timing:  Constant  Location: diffuse  Quality: Pressure  Modifying Factors: Chest pressure is increased with deep breaths, describing it as sore. Associated Symptoms: HA, numbness/tingling & swelling to the right side of her face and neck, baseline SOB increased with exercise, diaphoresis, nausea, leg swelling, and difficulty swallowing (\"feels like a knot in my throat\")    Additional History (Context):     2:08 PM    Hannah Werner is a 71 y.o. female with pertinent PMHx of MI (with two stents done by Dr. Obinna Elizalde), stroke (per pt), HTN, PE, CHF, Epilepsy, asthma, HLD, IDDM (normal blood sugar recently), and COPD presenting ambulatory to the ED c/o constant diffuse pruritis and chest pressure x 2 days. Chest pressure is increased with deep breaths, describing it as sore. She states that her chest pressure lasts 3-4 minutes at a time, goes away for 3-4 minutes, and then returns. Pt notes associated symptoms of baseline SOB increased with exercise, diaphoresis, nausea, leg swelling, HA, numbness/tingling & swelling to the right side of her face and neck, and difficulty swallowing (\"feels like a knot in my throat\"). The discomfort she has in her head is typical of her complex seizures. Pt states that she believes she is having a reaction to Coumadin, as it was recently increased (from 1mg to 6mg). Pt states that her sxs are slightly similar to her hx of MI (same chest pressure, but no tingling to her arms like her previous heart attacks). Pt states that the numbness/tingling to the right side of her face/neck feels similar to her previous stroke. Pt states that she did take her baby ASA today.  Pt specifically denies any rash or vomiting. Pt also notes a knot to the posterior left neck, which is painful. PCP: Nathaly Caldwell MD   Cardiology: Penelope Husain MD  Pt does not smoke tobacco (she was a former smoker), drink ETOH or do illicit drugs. There are no other complaints, changes, or physical findings at this time. Current Outpatient Prescriptions   Medication Sig Dispense Refill    diphenhydrAMINE (BENADRYL) 25 mg capsule Take 1 Cap by mouth every six (6) hours as needed for up to 10 days. 20 Cap 0    raNITIdine (ZANTAC) 150 mg tablet Take 150 mg by mouth two (2) times a day.  raNITIdine hcl 150 mg capsule Take 150 mg by mouth two (2) times a day.  warfarin (COUMADIN) 5 mg tablet Take 5 mg by mouth five (5) days a week. TAKE COUMADIN 7.5 MG X 2 DAYS THEN, TAKE COUMADIN 5 MG DAILY NEXT PROTIME WILL BE ON 12/7/17 AT DR. Patti Wilder OFFICE  Indications: DEEP VEIN THROMBOSIS PREVENTION      oxyCODONE-acetaminophen (PERCOCET) 5-325 mg per tablet Take 1 Tab by mouth every four (4) hours as needed for Pain. Max Daily Amount: 6 Tabs. Do NOT take with norco 20 Tab 0    lidocaine (LIDODERM) 5 % Apply patch to the affected area for 12 hours a day and remove for 12 hours a day. 15 Each 0    naproxen sodium (ALEVE) 220 mg tablet Take 2 Tabs by mouth two (2) times daily (with meals). 20 Tab 0    atenolol (TENORMIN) 100 mg tablet Take 200 mg by mouth daily.  gabapentin (NEURONTIN) 300 mg capsule Take 300 mg by mouth daily. 300mg am and 600mg pm      gabapentin (NEURONTIN) 300 mg capsule Take 600 mg by mouth every evening.  losartan-hydroCHLOROthiazide (HYZAAR) 100-12.5 mg per tablet Take 1 Tab by mouth daily.  simvastatin (ZOCOR) 20 mg tablet Take 20 mg by mouth nightly.  nortriptyline (PAMELOR) 25 mg capsule Take 100 mg by mouth nightly.  mometasone-formoterol (DULERA) 200-5 mcg/actuation HFA inhaler Take 2 Puffs by inhalation two (2) times a day.       insulin glargine (LANTUS) 100 unit/mL injection 30 Units by SubCUTAneous route daily.  montelukast (SINGULAIR) 10 mg tablet Take 10 mg by mouth every evening.  cyanocobalamin 1,000 mcg tablet Take 2,000 mcg by mouth daily.  glimepiride (AMARYL) 4 mg tablet Take 4 mg by mouth every morning.  aspirin 81 mg chewable tablet Take 1 Tab by mouth daily. 30 Tab 0    tiotropium bromide (SPIRIVA RESPIMAT) 2.5 mcg/actuation mist Take 1 Puff by inhalation two (2) times a day.  metformin (GLUCOPHAGE) 850 mg tablet Take 850 mg by mouth three (3) times daily. Past History     Past Medical History:  Past Medical History:   Diagnosis Date    Asthma     Chronic obstructive pulmonary disease ([facility]     Diabetes ([facility] [address]     HTN (hypertension), benign     Hyperlipidemia     Menopause     Myocardial infarction (HCC)     Neuropathy     Sciatica        Past Surgical History:  Past Surgical History:   Procedure Laterality Date    FOOT/TOES SURGERY PROC UNLISTED      HX CARPAL TUNNEL RELEASE      HX HEMORRHOIDECTOMY      HX HYSTERECTOMY      Age 40    HX KNEE REPLACEMENT         Family History:  Family History   Problem Relation Age of Onset    Breast Cancer Mother     Breast Cancer Sister        Social History:  Social History   Substance Use Topics    Smoking status: Former Smoker     Packs/day: 0.30     Years: 5.00     Quit date: 3/17/2005    Smokeless tobacco: Never Used    Alcohol use No       Allergies: Allergies   Allergen Reactions    Ibuprofen Swelling     mouth         Review of Systems   Review of Systems   Constitutional: Positive for diaphoresis. HENT: Positive for facial swelling (right sided) and trouble swallowing. Respiratory: Positive for shortness of breath (baseline). Cardiovascular: Positive for chest pain and leg swelling. Gastrointestinal: Positive for nausea. Negative for vomiting. Musculoskeletal: Positive for neck pain. Skin: Negative for rash.         Positive for diffuse pruritis Neurological: Positive for numbness and headaches. All other systems reviewed and are negative. Physical Exam     Vitals:    07/23/18 1412 07/23/18 1500   BP: (!) 182/93 179/67   Pulse: 68 (!) 58   Resp: 20 15   Temp: 98 °F (36.7 °C)    SpO2: 100% 98%   Weight: 90.7 kg (200 lb)    Height: 5' (1.524 m)      Physical Exam   Constitutional: She appears well-developed and well-nourished. No distress. HENT:   Head: Normocephalic and atraumatic. Eyes: EOM are normal. Pupils are equal, round, and reactive to light. Neck: Neck supple. Cardiovascular: Normal rate and regular rhythm. Exam reveals no gallop and no friction rub. No murmur heard. Trace to 1+ pitting edema to the bilateral legs   Pulmonary/Chest: Effort normal and breath sounds normal. No respiratory distress. She has no wheezes. She has no rales. She exhibits no tenderness. There is edema to the left supraclavicular region   Abdominal: Soft. Bowel sounds are normal. She exhibits no distension and no mass. There is no tenderness. There is no rebound and no guarding. Musculoskeletal:   Non tender to midline palpation of the cervical, thoracic, and lumbar spine. No step off or deformity. FROM of BUE and BLE against resistance in flexion and extension with 5/5 strength. Non tender to bilateral shoulders/elbows/hands/hips/knees/ankles. Pulses intact and equal.       Lymphadenopathy:     She has no cervical adenopathy. Neurological:   CN 2-12 intact, no pronator drift, normal finger to nose, no leg drift, no facial droop, no slurred speech. Skin: Skin is warm and dry. No rash noted. She is not diaphoretic. No erythema. Psychiatric: She has a normal mood and affect. Her behavior is normal.   Nursing note and vitals reviewed.       Diagnostic Study Results     Labs -     Recent Results (from the past 12 hour(s))   CBC WITH AUTOMATED DIFF    Collection Time: 07/23/18  2:15 PM   Result Value Ref Range    WBC 4.4 (L) 4.6 - 13.2 K/uL RBC 4.15 (L) 4.20 - 5.30 M/uL    HGB 11.7 (L) 12.0 - 16.0 g/dL    HCT 35.9 35.0 - 45.0 %    MCV 86.5 74.0 - 97.0 FL    MCH 28.2 24.0 - 34.0 PG    MCHC 32.6 31.0 - 37.0 g/dL    RDW 16.2 (H) 11.6 - 14.5 %    PLATELET 158 690 - 552 K/uL    MPV 10.6 9.2 - 11.8 FL    NEUTROPHILS 47 40 - 73 %    LYMPHOCYTES 44 21 - 52 %    MONOCYTES 8 3 - 10 %    EOSINOPHILS 0 0 - 5 %    BASOPHILS 1 0 - 2 %    ABS. NEUTROPHILS 2.1 1.8 - 8.0 K/UL    ABS. LYMPHOCYTES 1.9 0.9 - 3.6 K/UL    ABS. MONOCYTES 0.3 0.05 - 1.2 K/UL    ABS. EOSINOPHILS 0.0 0.0 - 0.4 K/UL    ABS. BASOPHILS 0.0 0.0 - 0.1 K/UL    DF AUTOMATED     METABOLIC PANEL, COMPREHENSIVE    Collection Time: 07/23/18  2:15 PM   Result Value Ref Range    Sodium 140 136 - 145 mmol/L    Potassium 3.5 3.5 - 5.5 mmol/L    Chloride 100 100 - 108 mmol/L    CO2 29 21 - 32 mmol/L    Anion gap 11 3.0 - 18 mmol/L    Glucose 128 (H) 74 - 99 mg/dL    BUN 10 7.0 - 18 MG/DL    Creatinine 1.00 0.6 - 1.3 MG/DL    BUN/Creatinine ratio 10 (L) 12 - 20      GFR est AA >60 >60 ml/min/1.73m2    GFR est non-AA 55 (L) >60 ml/min/1.73m2    Calcium 9.2 8.5 - 10.1 MG/DL    Bilirubin, total 0.4 0.2 - 1.0 MG/DL    ALT (SGPT) 33 13 - 56 U/L    AST (SGOT) 31 15 - 37 U/L    Alk.  phosphatase 81 45 - 117 U/L    Protein, total 8.1 6.4 - 8.2 g/dL    Albumin 3.7 3.4 - 5.0 g/dL    Globulin 4.4 (H) 2.0 - 4.0 g/dL    A-G Ratio 0.8 0.8 - 1.7     PROTHROMBIN TIME + INR    Collection Time: 07/23/18  2:15 PM   Result Value Ref Range    Prothrombin time 12.4 11.5 - 15.2 sec    INR 1.0 0.8 - 1.2     CARDIAC PANEL,(CK, CKMB & TROPONIN)    Collection Time: 07/23/18  2:15 PM   Result Value Ref Range     26 - 192 U/L    CK - MB 1.5 <3.6 ng/ml    CK-MB Index 1.4 0.0 - 4.0 %    Troponin-I, Qt. <0.02 0.00 - 0.06 NG/ML   NT-PRO BNP    Collection Time: 07/23/18  2:15 PM   Result Value Ref Range    NT pro- 0 - 900 PG/ML   D DIMER    Collection Time: 07/23/18  2:15 PM   Result Value Ref Range    D DIMER 0.31 <0.46 ug/ml(FEU)   EKG, 12 LEAD, INITIAL    Collection Time: 07/23/18  2:25 PM   Result Value Ref Range    Ventricular Rate 61 BPM    Atrial Rate 61 BPM    P-R Interval 174 ms    QRS Duration 72 ms    Q-T Interval 440 ms    QTC Calculation (Bezet) 442 ms    Calculated R Axis 44 degrees    Calculated T Axis 89 degrees    Diagnosis       Normal sinus rhythm  Low voltage QRS  Borderline ECG  When compared with ECG of 21-NOV-2017 14:05,  T wave amplitude has decreased in Lateral leads         Radiologic Studies -   CT HEAD WO CONT   Final Result      XR CHEST PA LAT    (Results Pending)     CT Results  (Last 48 hours)               07/23/18 1507  CT HEAD WO CONT Final result    Impression:  IMPRESSION:           1. No evidence of acute infarct, hemorrhage or mass. 2. Stable extensive bilateral white matter hypodensity consistent with   microvascular ischemic disease. Narrative:  EXAM: CT head       INDICATION: Posterior headache for one week       COMPARISON: Noncontrast CT brain 12/7/2015]       TECHNIQUE: Axial CT imaging of the head was performed without intravenous   contrast. Coronal and sagittal reconstructions were performed. One or more dose reduction techniques were used on this CT: automated exposure   control, adjustment of the mAs and/or kVp according to patient's size, and   iterative reconstruction techniques. The specific techniques utilized on this CT   exam have been documented in the patient's electronic medical record. .       _______________       FINDINGS:       BRAIN AND POSTERIOR FOSSA: Ventricles are normal in size and surface sulci. There is extensive white matter hypoattenuation which is unchanged from prior   exam. There is no intracranial hemorrhage, mass effect, or midline shift. No   evidence of acute cortical infarct. EXTRA-AXIAL SPACES AND MENINGES: There are no abnormal extra-axial fluid   collections. CALVARIUM: Intact. SINUSES: Clear.        OTHER: Visualized orbital structures and mastoid air cells are unremarkable.       _______________               CXR Results  (Last 48 hours)    None        2:45 PM  RADIOLOGY FINDINGS  Chest X-ray shows no acute process, there is persistent nodule to the right lower lung. Chronic lung changes. Pending review by Radiologist  Recorded by Payton Tillman ED Scribe, as dictated by Marcos Matias PA-C. Medical Decision Making   I am the first provider for this patient. I reviewed the vital signs, available nursing notes, past medical history, past surgical history, family history and social history. Vital Signs-Reviewed the patient's vital signs. Pulse Oximetry Analysis - 100% on RA     EKG interpretation: (Preliminary)   NSR at 61 bpm. No STEMI. No significant changes from prior on November 21st 2017. EKG read by Marcos Matias PA-C at 2:28 PM    Records Reviewed: Nursing Notes and Old Medical Records    Provider Notes (Medical Decision Making): Impression: Subtherapeutic INR, atypical chest pain, HTN. Noted labs, trop negative and since chest pain has been nearly constant since yesterday, no repeat needed. EKG reassuring. Noted INR which is subtherapeutic,  D Dimer is WNL, she has no evidence on exam for calf pain, unilateral leg swelling. Head CT reassuring. Has a history of complex partial seizures. Is fully neurologically intact on exam with no deficit, no dysarthria, no ataxia. Noted lung nodule on CXR which is unchanged from prior CXR. Discussed with patient and asked her to have her PCP follow this. Patient states she is itching all over but no rash, plan to send home with benadryl. Discussed with Dr. Mata Officer. She agrees with plan for discharge. Will have patient follow with PCP to adjust coumadin. Cards follow up as well.   Niya Du      PROCEDURES:  Procedures    MEDICATIONS GIVEN IN THE ED:  Medications   aspirin chewable tablet 243 mg (243 mg Oral Given 7/23/18 8704)        ED COURSE:   2:08 PM   Initial assessment performed. 2:22 PM - Per chart review: she had a CTA head/neck in May 2018 which was negative for stroke, and she had no significant carotid stenosis. On May 7th, she had an Echo at [facility] which showed an EF of 60%, with no significant valvular heart disease. She has been diagnosed with partial symptomatic Epilepsy with hx of complex partial seizures. On May 2018 she was admitted to [facility] for presumed CVA, but had a negative MRI. Dr. Zenon Cook (neurology there), thought that she had a partial simple seizure although her EEG was negative at time of admission. He re-started her on Keppra, and DCed her Trileptal. Gabapentin was increased to 500mg. Dr. Zenon Cook seems to think that her simple complex seizures may be due to a previous stroke. I do not see anywhere in her hx that she has had a cardiac cath. 3:14 PM - Reviewed the pts labs: she has a negative troponin; INR is not therapeutic. The pt reports a hx of PE. She is not tachycardic or hypoxic today, but we will order a D-dimer. If D-dimer is positive will move forward with a CTA. PROGRESS NOTE:  3:53 PM  Discussed pt's hx and available diagnostic and imaging results with Carmine Serrano MD, the pt's attending. Reviewed care plans and Carmine Serrano MD is in agreement with the plan of care (discharge with PCP follow up). Written by Jakie Severe Mollie Eng, ED Scribe, as dictated by Muna Gant PA-C. PROGRESS NOTE:  3:57 PM  Pt and/or family have been updated on their results. Pt and/or pt's family are aware of the plan of care and are in agreement. Written by Jakie Severe Mollie Eng, ED Scribe, as dictated by Muna Gant PA-C. Diagnosis and Disposition       DISCHARGE NOTE:  3:59 PM  The patient is ready for discharge. The patient's signs, symptoms, diagnosis, and discharge instructions have been discussed and the patient and/or family has conveyed their understanding.  The patient and/or family is to follow up as recommended or return to the ER should their symptoms worsen. Plan has been discussed and the patient and/or family is in agreement. Written by BRIE Potter, as dictated by Lena Lyman PA-C.     CLINICAL IMPRESSION:  1. Subtherapeutic international normalized ratio (INR)    2. Atypical chest pain    3. Itching        PLAN:  1. D/C Home  2. Current Discharge Medication List      START taking these medications    Details   diphenhydrAMINE (BENADRYL) 25 mg capsule Take 1 Cap by mouth every six (6) hours as needed for up to 10 days. Qty: 20 Cap, Refills: 0         CONTINUE these medications which have NOT CHANGED    Details   raNITIdine (ZANTAC) 150 mg tablet Take 150 mg by mouth two (2) times a day. raNITIdine hcl 150 mg capsule Take 150 mg by mouth two (2) times a day. warfarin (COUMADIN) 5 mg tablet Take 5 mg by mouth five (5) days a week. TAKE COUMADIN 7.5 MG X 2 DAYS THEN, TAKE COUMADIN 5 MG DAILY NEXT PROTIME WILL BE ON 12/7/17 AT DR. Patty Elkins OFFICE  Indications: DEEP VEIN THROMBOSIS PREVENTION      oxyCODONE-acetaminophen (PERCOCET) 5-325 mg per tablet Take 1 Tab by mouth every four (4) hours as needed for Pain. Max Daily Amount: 6 Tabs. Do NOT take with norco  Qty: 20 Tab, Refills: 0      lidocaine (LIDODERM) 5 % Apply patch to the affected area for 12 hours a day and remove for 12 hours a day. Qty: 15 Each, Refills: 0      naproxen sodium (ALEVE) 220 mg tablet Take 2 Tabs by mouth two (2) times daily (with meals). Qty: 20 Tab, Refills: 0      atenolol (TENORMIN) 100 mg tablet Take 200 mg by mouth daily. !! gabapentin (NEURONTIN) 300 mg capsule Take 300 mg by mouth daily. 300mg am and 600mg pm      !! gabapentin (NEURONTIN) 300 mg capsule Take 600 mg by mouth every evening. losartan-hydroCHLOROthiazide (HYZAAR) 100-12.5 mg per tablet Take 1 Tab by mouth daily. simvastatin (ZOCOR) 20 mg tablet Take 20 mg by mouth nightly.       nortriptyline (PAMELOR) 25 mg capsule Take 100 mg by mouth nightly. mometasone-formoterol (DULERA) 200-5 mcg/actuation HFA inhaler Take 2 Puffs by inhalation two (2) times a day. insulin glargine (LANTUS) 100 unit/mL injection 30 Units by SubCUTAneous route daily. montelukast (SINGULAIR) 10 mg tablet Take 10 mg by mouth every evening. cyanocobalamin 1,000 mcg tablet Take 2,000 mcg by mouth daily. glimepiride (AMARYL) 4 mg tablet Take 4 mg by mouth every morning. aspirin 81 mg chewable tablet Take 1 Tab by mouth daily. Qty: 30 Tab, Refills: 0      tiotropium bromide (SPIRIVA RESPIMAT) 2.5 mcg/actuation mist Take 1 Puff by inhalation two (2) times a day. metformin (GLUCOPHAGE) 850 mg tablet Take 850 mg by mouth three (3) times daily. !! - Potential duplicate medications found. Please discuss with provider. STOP taking these medications       methylPREDNISolone (MEDROL, PATTI,) 4 mg tablet Comments:   Reason for Stopping:             3.   Follow-up Information     Follow up With Details Comments Contact Info    Chey Acevedo MD Schedule an appointment as soon as possible for a visit in 2 days for primary care follow up, as needed [facility]  [phone]      THE [facility] EMERGENCY DEPT  As needed, If symptoms worsen [address] [id number] [phone]        _______________________________    Attestations: This note is prepared by Mela Quijano, acting as Scribe for AZALEA Novak. AZALEA Novak:  The scribe's documentation has been prepared under my direction and personally reviewed by me in its entirety.   I confirm that the note above accurately reflects all work, treatment, procedures, and medical decision making performed by me.  _______________________________

## 2018-07-27 ENCOUNTER — APPOINTMENT (OUTPATIENT)
Dept: MRI IMAGING | Age: 73
DRG: 069 | End: 2018-07-27
Attending: PHYSICIAN ASSISTANT
Payer: MEDICARE

## 2018-07-27 ENCOUNTER — APPOINTMENT (OUTPATIENT)
Dept: CT IMAGING | Age: 73
DRG: 069 | End: 2018-07-27
Attending: INTERNAL MEDICINE
Payer: MEDICARE

## 2018-07-27 ENCOUNTER — APPOINTMENT (OUTPATIENT)
Dept: GENERAL RADIOLOGY | Age: 73
DRG: 069 | End: 2018-07-27
Attending: INTERNAL MEDICINE
Payer: MEDICARE

## 2018-07-27 ENCOUNTER — HOSPITAL ENCOUNTER (INPATIENT)
Age: 73
LOS: 3 days | Discharge: HOME HEALTH | DRG: 069 | End: 2018-07-30
Attending: INTERNAL MEDICINE | Admitting: HOSPITALIST
Payer: MEDICARE

## 2018-07-27 ENCOUNTER — APPOINTMENT (OUTPATIENT)
Dept: NON INVASIVE DIAGNOSTICS | Age: 73
DRG: 069 | End: 2018-07-27
Attending: PHYSICIAN ASSISTANT
Payer: MEDICARE

## 2018-07-27 DIAGNOSIS — Z79.01 SUBTHERAPEUTIC ANTICOAGULATION: ICD-10-CM

## 2018-07-27 DIAGNOSIS — G45.9 TRANSIENT CEREBRAL ISCHEMIA, UNSPECIFIED TYPE: Primary | ICD-10-CM

## 2018-07-27 DIAGNOSIS — Z51.81 SUBTHERAPEUTIC ANTICOAGULATION: ICD-10-CM

## 2018-07-27 DIAGNOSIS — I63.312 CEREBROVASCULAR ACCIDENT (CVA) DUE TO THROMBOSIS OF LEFT MIDDLE CEREBRAL ARTERY (HCC): ICD-10-CM

## 2018-07-27 PROBLEM — I63.9 CVA (CEREBRAL VASCULAR ACCIDENT) (HCC): Status: ACTIVE | Noted: 2018-07-27

## 2018-07-27 PROBLEM — R47.81 SLURRED SPEECH: Status: ACTIVE | Noted: 2018-07-27

## 2018-07-27 PROBLEM — R47.01 EXPRESSIVE APHASIA: Status: ACTIVE | Noted: 2018-07-27

## 2018-07-27 LAB
ALBUMIN SERPL-MCNC: 3.5 G/DL (ref 3.4–5)
ALBUMIN/GLOB SERPL: 0.8 {RATIO} (ref 0.8–1.7)
ALP SERPL-CCNC: 85 U/L (ref 45–117)
ALT SERPL-CCNC: 29 U/L (ref 13–56)
ANION GAP SERPL CALC-SCNC: 11 MMOL/L (ref 3–18)
APPEARANCE UR: CLEAR
APTT PPP: 36.8 SEC (ref 23–36.4)
AST SERPL-CCNC: 22 U/L (ref 15–37)
BACTERIA URNS QL MICRO: ABNORMAL /HPF
BASOPHILS # BLD: 0 K/UL (ref 0–0.1)
BASOPHILS NFR BLD: 0 % (ref 0–2)
BILIRUB SERPL-MCNC: 0.2 MG/DL (ref 0.2–1)
BILIRUB UR QL: NEGATIVE
BUN SERPL-MCNC: 12 MG/DL (ref 7–18)
BUN/CREAT SERPL: 11 (ref 12–20)
CALCIUM SERPL-MCNC: 8.9 MG/DL (ref 8.5–10.1)
CHLORIDE SERPL-SCNC: 101 MMOL/L (ref 100–108)
CK MB CFR SERPL CALC: 2 % (ref 0–4)
CK MB SERPL-MCNC: 1.5 NG/ML (ref 5–25)
CK SERPL-CCNC: 76 U/L (ref 26–192)
CO2 SERPL-SCNC: 27 MMOL/L (ref 21–32)
COLOR UR: YELLOW
CREAT SERPL-MCNC: 1.07 MG/DL (ref 0.6–1.3)
DIFFERENTIAL METHOD BLD: ABNORMAL
EOSINOPHIL # BLD: 0 K/UL (ref 0–0.4)
EOSINOPHIL NFR BLD: 1 % (ref 0–5)
EPITH CASTS URNS QL MICRO: ABNORMAL /LPF (ref 0–5)
ERYTHROCYTE [DISTWIDTH] IN BLOOD BY AUTOMATED COUNT: 16.2 % (ref 11.6–14.5)
EST. AVERAGE GLUCOSE BLD GHB EST-MCNC: 203 MG/DL
GLOBULIN SER CALC-MCNC: 4.4 G/DL (ref 2–4)
GLUCOSE BLD STRIP.AUTO-MCNC: 106 MG/DL (ref 70–110)
GLUCOSE BLD STRIP.AUTO-MCNC: 131 MG/DL (ref 70–110)
GLUCOSE SERPL-MCNC: 105 MG/DL (ref 74–99)
GLUCOSE UR STRIP.AUTO-MCNC: NEGATIVE MG/DL
HBA1C MFR BLD: 8.7 % (ref 4.5–5.6)
HCT VFR BLD AUTO: 35.1 % (ref 35–45)
HGB BLD-MCNC: 11.2 G/DL (ref 12–16)
HGB UR QL STRIP: NEGATIVE
INR PPP: 1.3 (ref 0.8–1.2)
KETONES UR QL STRIP.AUTO: NEGATIVE MG/DL
LEUKOCYTE ESTERASE UR QL STRIP.AUTO: ABNORMAL
LYMPHOCYTES # BLD: 2.3 K/UL (ref 0.9–3.6)
LYMPHOCYTES NFR BLD: 45 % (ref 21–52)
MCH RBC QN AUTO: 27.8 PG (ref 24–34)
MCHC RBC AUTO-ENTMCNC: 31.9 G/DL (ref 31–37)
MCV RBC AUTO: 87.1 FL (ref 74–97)
MONOCYTES # BLD: 0.3 K/UL (ref 0.05–1.2)
MONOCYTES NFR BLD: 6 % (ref 3–10)
NEUTS SEG # BLD: 2.5 K/UL (ref 1.8–8)
NEUTS SEG NFR BLD: 48 % (ref 40–73)
NITRITE UR QL STRIP.AUTO: NEGATIVE
PH UR STRIP: 5 [PH] (ref 5–8)
PLATELET # BLD AUTO: 284 K/UL (ref 135–420)
PMV BLD AUTO: 10.2 FL (ref 9.2–11.8)
POTASSIUM SERPL-SCNC: 3.9 MMOL/L (ref 3.5–5.5)
PROT SERPL-MCNC: 7.9 G/DL (ref 6.4–8.2)
PROT UR STRIP-MCNC: NEGATIVE MG/DL
PROTHROMBIN TIME: 15.2 SEC (ref 11.5–15.2)
RBC # BLD AUTO: 4.03 M/UL (ref 4.2–5.3)
RBC #/AREA URNS HPF: NEGATIVE /HPF (ref 0–5)
SODIUM SERPL-SCNC: 139 MMOL/L (ref 136–145)
SP GR UR REFRACTOMETRY: 1.02 (ref 1–1.03)
TROPONIN I SERPL-MCNC: <0.02 NG/ML (ref 0–0.06)
TSH SERPL DL<=0.05 MIU/L-ACNC: 1.4 UIU/ML (ref 0.36–3.74)
UROBILINOGEN UR QL STRIP.AUTO: 1 EU/DL (ref 0.2–1)
WBC # BLD AUTO: 5.1 K/UL (ref 4.6–13.2)
WBC URNS QL MICRO: ABNORMAL /HPF (ref 0–5)

## 2018-07-27 PROCEDURE — 99285 EMERGENCY DEPT VISIT HI MDM: CPT

## 2018-07-27 PROCEDURE — 80053 COMPREHEN METABOLIC PANEL: CPT | Performed by: INTERNAL MEDICINE

## 2018-07-27 PROCEDURE — 65660000000 HC RM CCU STEPDOWN

## 2018-07-27 PROCEDURE — 83036 HEMOGLOBIN GLYCOSYLATED A1C: CPT | Performed by: PHYSICIAN ASSISTANT

## 2018-07-27 PROCEDURE — 74011250637 HC RX REV CODE- 250/637: Performed by: PHYSICIAN ASSISTANT

## 2018-07-27 PROCEDURE — 93005 ELECTROCARDIOGRAM TRACING: CPT

## 2018-07-27 PROCEDURE — 74011250636 HC RX REV CODE- 250/636: Performed by: PHYSICIAN ASSISTANT

## 2018-07-27 PROCEDURE — 70450 CT HEAD/BRAIN W/O DYE: CPT

## 2018-07-27 PROCEDURE — 85610 PROTHROMBIN TIME: CPT | Performed by: INTERNAL MEDICINE

## 2018-07-27 PROCEDURE — 74011250636 HC RX REV CODE- 250/636: Performed by: INTERNAL MEDICINE

## 2018-07-27 PROCEDURE — 74011250637 HC RX REV CODE- 250/637: Performed by: INTERNAL MEDICINE

## 2018-07-27 PROCEDURE — 70551 MRI BRAIN STEM W/O DYE: CPT

## 2018-07-27 PROCEDURE — 71045 X-RAY EXAM CHEST 1 VIEW: CPT

## 2018-07-27 PROCEDURE — 81001 URINALYSIS AUTO W/SCOPE: CPT | Performed by: INTERNAL MEDICINE

## 2018-07-27 PROCEDURE — 96360 HYDRATION IV INFUSION INIT: CPT

## 2018-07-27 PROCEDURE — 85730 THROMBOPLASTIN TIME PARTIAL: CPT | Performed by: INTERNAL MEDICINE

## 2018-07-27 PROCEDURE — 94762 N-INVAS EAR/PLS OXIMTRY CONT: CPT

## 2018-07-27 PROCEDURE — 74011250636 HC RX REV CODE- 250/636: Performed by: HOSPITALIST

## 2018-07-27 PROCEDURE — 82962 GLUCOSE BLOOD TEST: CPT

## 2018-07-27 PROCEDURE — 99284 EMERGENCY DEPT VISIT MOD MDM: CPT

## 2018-07-27 PROCEDURE — 84443 ASSAY THYROID STIM HORMONE: CPT | Performed by: INTERNAL MEDICINE

## 2018-07-27 PROCEDURE — 82550 ASSAY OF CK (CPK): CPT | Performed by: INTERNAL MEDICINE

## 2018-07-27 PROCEDURE — 87086 URINE CULTURE/COLONY COUNT: CPT | Performed by: HOSPITALIST

## 2018-07-27 PROCEDURE — 85025 COMPLETE CBC W/AUTO DIFF WBC: CPT | Performed by: INTERNAL MEDICINE

## 2018-07-27 RX ORDER — MAGNESIUM SULFATE 100 %
4 CRYSTALS MISCELLANEOUS AS NEEDED
Status: DISCONTINUED | OUTPATIENT
Start: 2018-07-27 | End: 2018-07-30 | Stop reason: HOSPADM

## 2018-07-27 RX ORDER — SODIUM CHLORIDE 0.9 % (FLUSH) 0.9 %
5-10 SYRINGE (ML) INJECTION EVERY 8 HOURS
Status: DISCONTINUED | OUTPATIENT
Start: 2018-07-27 | End: 2018-07-30 | Stop reason: HOSPADM

## 2018-07-27 RX ORDER — SODIUM CHLORIDE 0.9 % (FLUSH) 0.9 %
5-10 SYRINGE (ML) INJECTION AS NEEDED
Status: DISCONTINUED | OUTPATIENT
Start: 2018-07-27 | End: 2018-07-30 | Stop reason: HOSPADM

## 2018-07-27 RX ORDER — ENOXAPARIN SODIUM 100 MG/ML
40 INJECTION SUBCUTANEOUS EVERY 24 HOURS
Status: DISCONTINUED | OUTPATIENT
Start: 2018-07-27 | End: 2018-07-30 | Stop reason: HOSPADM

## 2018-07-27 RX ORDER — GUAIFENESIN 100 MG/5ML
324 LIQUID (ML) ORAL
Status: DISCONTINUED | OUTPATIENT
Start: 2018-07-27 | End: 2018-07-27

## 2018-07-27 RX ORDER — GUAIFENESIN 100 MG/5ML
243 LIQUID (ML) ORAL
Status: COMPLETED | OUTPATIENT
Start: 2018-07-27 | End: 2018-07-27

## 2018-07-27 RX ORDER — FAMOTIDINE 20 MG/1
20 TABLET, FILM COATED ORAL 2 TIMES DAILY
Status: DISCONTINUED | OUTPATIENT
Start: 2018-07-27 | End: 2018-07-30 | Stop reason: HOSPADM

## 2018-07-27 RX ORDER — LABETALOL HYDROCHLORIDE 5 MG/ML
5 INJECTION, SOLUTION INTRAVENOUS
Status: DISCONTINUED | OUTPATIENT
Start: 2018-07-27 | End: 2018-07-30 | Stop reason: HOSPADM

## 2018-07-27 RX ORDER — GUAIFENESIN 100 MG/5ML
81 LIQUID (ML) ORAL DAILY
Status: DISCONTINUED | OUTPATIENT
Start: 2018-07-28 | End: 2018-07-30 | Stop reason: HOSPADM

## 2018-07-27 RX ORDER — SODIUM CHLORIDE 9 MG/ML
100 INJECTION, SOLUTION INTRAVENOUS CONTINUOUS
Status: DISCONTINUED | OUTPATIENT
Start: 2018-07-27 | End: 2018-07-29

## 2018-07-27 RX ORDER — INSULIN LISPRO 100 [IU]/ML
INJECTION, SOLUTION INTRAVENOUS; SUBCUTANEOUS
Status: DISCONTINUED | OUTPATIENT
Start: 2018-07-27 | End: 2018-07-30 | Stop reason: HOSPADM

## 2018-07-27 RX ORDER — ONDANSETRON 2 MG/ML
4 INJECTION INTRAMUSCULAR; INTRAVENOUS
Status: DISCONTINUED | OUTPATIENT
Start: 2018-07-27 | End: 2018-07-30 | Stop reason: HOSPADM

## 2018-07-27 RX ORDER — ATORVASTATIN CALCIUM 20 MG/1
40 TABLET, FILM COATED ORAL
Status: DISCONTINUED | OUTPATIENT
Start: 2018-07-27 | End: 2018-07-30 | Stop reason: HOSPADM

## 2018-07-27 RX ORDER — DEXTROSE 50 % IN WATER (D50W) INTRAVENOUS SYRINGE
25-50 AS NEEDED
Status: DISCONTINUED | OUTPATIENT
Start: 2018-07-27 | End: 2018-07-30 | Stop reason: HOSPADM

## 2018-07-27 RX ORDER — SODIUM CHLORIDE 9 MG/ML
100 INJECTION, SOLUTION INTRAVENOUS CONTINUOUS
Status: DISCONTINUED | OUTPATIENT
Start: 2018-07-27 | End: 2018-07-27

## 2018-07-27 RX ORDER — SIMVASTATIN 20 MG/1
20 TABLET, FILM COATED ORAL
Status: DISCONTINUED | OUTPATIENT
Start: 2018-07-27 | End: 2018-07-27

## 2018-07-27 RX ORDER — ACETAMINOPHEN 325 MG/1
650 TABLET ORAL
Status: DISCONTINUED | OUTPATIENT
Start: 2018-07-27 | End: 2018-07-29

## 2018-07-27 RX ADMIN — ACETAMINOPHEN 650 MG: 325 TABLET, FILM COATED ORAL at 20:05

## 2018-07-27 RX ADMIN — SODIUM CHLORIDE 100 ML/HR: 900 INJECTION, SOLUTION INTRAVENOUS at 19:11

## 2018-07-27 RX ADMIN — Medication 10 ML: at 19:12

## 2018-07-27 RX ADMIN — ATORVASTATIN CALCIUM 40 MG: 20 TABLET, FILM COATED ORAL at 21:57

## 2018-07-27 RX ADMIN — ONDANSETRON 4 MG: 2 INJECTION INTRAMUSCULAR; INTRAVENOUS at 21:57

## 2018-07-27 RX ADMIN — SODIUM CHLORIDE 1000 ML: 900 INJECTION, SOLUTION INTRAVENOUS at 13:52

## 2018-07-27 RX ADMIN — FAMOTIDINE 20 MG: 20 TABLET ORAL at 20:05

## 2018-07-27 RX ADMIN — Medication 10 ML: at 21:57

## 2018-07-27 RX ADMIN — ASPIRIN 81 MG 243 MG: 81 TABLET ORAL at 14:22

## 2018-07-27 RX ADMIN — ENOXAPARIN SODIUM 40 MG: 40 INJECTION, SOLUTION INTRAVENOUS; SUBCUTANEOUS at 19:11

## 2018-07-27 NOTE — H&P
History & Physical    Patient: Maynor Negron MRN: [id number]  CSN: [id number]    YOB: 1945  Age: 71 y.o. Sex: female      DOA: 7/27/2018  Primary Care Provider:  Sugey Cramer MD      Assessment/Plan     Patient Active Problem List   Diagnosis Code    HTN (hypertension), benign I10    Asthma J45.909    Neuropathy G62.9    Hyperlipidemia E78.5    Dizziness R42    Chest pain in adult R07.9    COPD (chronic obstructive pulmonary disease) ([facility] [address] J44.9    TIA (transient ischemic attack) G45.9    Slurred speech R47.81    CVA (cerebral vascular accident) ([facility] [address] I63.9    Subtherapeutic anticoagulation Z51.81, Z79.01       1. Expressive Aphasia  2. Right-Sided Weakness  3. Hx of CVA  4. Hypertension  5. Hyperlipidemia  6. DM2    1. Admit to telemetry unit for further care. Patient had a CT head w/o contrast in the ED which was negative for any acute intracranial abnormalities, but did show chronic changes. Patient will have echocardiogram, MRI, and further testing per neurology recommendations. For now will keep patient NPO, have speech evaluate her and give recommendations. Monitor for hypotension and will give patient IVF at 100mL/hr. 2. As above, echocardiogram and MRI brain without contrast. Order placed for PT/OT. If not working with skilled therapy, recommend that she remain on bedrest.   3. Continue with aspirin and lipid lowering therapy. 4. Permissive hypertension in the initial setting until workup has been completed. Labetalol ordered if needed. 5. As above, continue lipid lowering therapy. 6. Will place patient on sliding scale insulin to manage blood glucose levels. Monitor for hypoglycemia. Patient is currently NPO until eval by SLP.      DVT PPx - SCDs  GI PPx - Pepcid    Estimated length of stay : 1-2 days    CC: \"She can't talk\"       HPI:     Maynor Negron is a 71 y.o. female who has a past medical history of previous CVA, HTN, dyslipidemia, DM, COPD who presents to the ED today with acute onset of right sided weakness and inability to speak. As she is unable to speak, the patient's  provides the history. He states that after he left for work this morning, he received a call from the patient approximately early afternoon that stated she was having slurred speech and felt like she was unsteady on her feet, bumping into things around the house. He then stated that he felt something wasn't right and brought the patient to the ED. He says he is unsure if the right leg weakness is residual from previous knee surgery in 11/2017. When the patient is asked ROS questions, she is able to respond by nodding or shaking her head. She reports HA, blurred vision, inability to speak, right upper and lower extremity weakness. She denies chest pain, N/V/D, urinary complaints. In the ED a 'Code S' was called, teleneuro was consulted and discussed the possibility of tPA with the patient's family, who ultimately declined administration of tPA. Have been asked to admit for further care.      Past Medical History:   Diagnosis Date    Asthma     Chronic obstructive pulmonary disease (HCC)     Diabetes ([facility] [address]     HTN (hypertension), benign     Hyperlipidemia     Menopause     Myocardial infarction (HCC)     Neuropathy     Sciatica     Stroke [facility])        Past Surgical History:   Procedure Laterality Date    FOOT/TOES SURGERY PROC UNLISTED      HX CARPAL TUNNEL RELEASE      HX HEMORRHOIDECTOMY      HX HYSTERECTOMY      Age 40    HX KNEE REPLACEMENT         Family History   Problem Relation Age of Onset    Breast Cancer Mother     Breast Cancer Sister        Social History     Social History    Marital status:      Spouse name: N/A    Number of children: N/A    Years of education: N/A     Social History Main Topics    Smoking status: Former Smoker     Packs/day: 0.30     Years: 5.00     Quit date: 3/17/2005    Smokeless tobacco: Never Used    Alcohol use No    Drug use: No    Sexual activity: Not Asked     Other Topics Concern    None     Social History Narrative       Prior to Admission medications    Medication Sig Start Date End Date Taking? Authorizing Provider   diphenhydrAMINE (BENADRYL) 25 mg capsule Take 1 Cap by mouth every six (6) hours as needed for up to 10 days. 7/23/18 8/2/18  LUZ MARIA Le   famotidine (PEPCID) 20 mg tablet Take 1 Tab by mouth two (2) times a day for 10 days. 7/23/18 8/2/18  LUZ MARIA Le   warfarin (COUMADIN) 5 mg tablet Take 5 mg by mouth five (5) days a week. TAKE COUMADIN 7.5 MG X 2 DAYS THEN, TAKE COUMADIN 5 MG DAILY NEXT PROTIME WILL BE ON 12/7/17 AT DR. Apple Medley OFFICE  Indications: DEEP VEIN THROMBOSIS PREVENTION    Historical Provider   oxyCODONE-acetaminophen (PERCOCET) 5-325 mg per tablet Take 1 Tab by mouth every four (4) hours as needed for Pain. Max Daily Amount: 6 Tabs. Do NOT take with norco 8/30/17   Darryn Hagan PA-C   lidocaine (LIDODERM) 5 % Apply patch to the affected area for 12 hours a day and remove for 12 hours a day. 8/30/17   Gretel Mobley PA-C   naproxen sodium (ALEVE) 220 mg tablet Take 2 Tabs by mouth two (2) times daily (with meals). 5/21/17   Aleja Rodriguez MD   atenolol (TENORMIN) 100 mg tablet Take 200 mg by mouth daily. Historical Provider   gabapentin (NEURONTIN) 300 mg capsule Take 300 mg by mouth daily. 300mg am and 600mg pm    Historical Provider   gabapentin (NEURONTIN) 300 mg capsule Take 600 mg by mouth every evening. Historical Provider   losartan-hydroCHLOROthiazide (HYZAAR) 100-12.5 mg per tablet Take 1 Tab by mouth daily. Historical Provider   simvastatin (ZOCOR) 20 mg tablet Take 20 mg by mouth nightly. Historical Provider   nortriptyline (PAMELOR) 25 mg capsule Take 100 mg by mouth nightly. Historical Provider   mometasone-formoterol (DULERA) 200-5 mcg/actuation HFA inhaler Take 2 Puffs by inhalation two (2) times a day.     Historical Provider insulin glargine (LANTUS) 100 unit/mL injection 30 Units by SubCUTAneous route daily. Historical Provider   montelukast (SINGULAIR) 10 mg tablet Take 10 mg by mouth every evening. Historical Provider   cyanocobalamin 1,000 mcg tablet Take 2,000 mcg by mouth daily. Historical Provider   glimepiride (AMARYL) 4 mg tablet Take 4 mg by mouth every morning. Historical Provider   aspirin 81 mg chewable tablet Take 1 Tab by mouth daily. 12/9/15   Endy Bryson MD   tiotropium bromide (SPIRIVA RESPIMAT) 2.5 mcg/actuation mist Take 1 Puff by inhalation two (2) times a day. Krishna Heredia MD   metformin (GLUCOPHAGE) 850 mg tablet Take 850 mg by mouth three (3) times daily. 3/17/11   Historical Provider       Allergies   Allergen Reactions    Dilaudid [Hydromorphone] Other (comments)     Hallucinations      Ibuprofen Swelling     mouth       Review of Systems  Gen: No fever, chills, malaise, weight loss/gain. Heent: +headache, No inorrhea, epistaxis, ear pain, hearing loss, sinus pain, neck pain/stiffness, sore throat. Heart: No chest pain, palpitations, BROOKE, pnd, or orthopnea. Resp: No cough, hemoptysis, wheezing and shortness of breath. GI: No nausea, vomiting, diarrhea, constipation, melena or hematochezia. : No urinary obstruction, dysuria or hematuria. Derm: No rash, new skin lesion or pruritis. Musc/skeletal: no bone or joint complains. Vasc: No edema, cyanosis or claudication. Endo: No heat/cold intolerance, no polyuria,polydipsia or polyphagia. Neuro: + Right-unilateral weakness, numbness, tingling. No seizures. Heme: No easy bruising or bleeding. Physical Exam:     Physical Exam:  Visit Vitals    BP (!) 147/106    Pulse 80    Temp 99.2 °F (37.3 °C)    Resp 21    Ht 5' 4\" (1.626 m)    SpO2 99%      O2 Device: Room air    Temp (24hrs), Av.1 °F (37.3 °C), Min:99 °F (37.2 °C), Max:99.2 °F (37.3 °C)             General:  Awake, cooperative, no distress. Tearful. Aphasic. Head:  Normocephalic, without obvious abnormality, atraumatic. Eyes:  Conjunctivae/corneas clear, sclera anicteric, PERRL, EOMs intact. Nose: Nares normal. No drainage or sinus tenderness. Throat: Lips, mucosa, and tongue normal.    Neck: Supple, symmetrical, trachea midline, no adenopathy. Lungs:   Clear to auscultation bilaterally. Heart:  Regular rate and rhythm, S1, S2 normal, no murmur, click, rub or gallop. Abdomen: Soft, non-tender. Bowel sounds normal. No masses,  No organomegaly. Extremities: Extremities normal, atraumatic, no cyanosis or edema. Capillary refill normal.   Pulses: 2+ and symmetric all extremities. Skin: Skin color and turgor normal. No rashes or lesions   Neurologic: CNII-XII intact. No focal motor or sensory deficit. 3/5 strength RUE, 3/5 strength RLE. Labs Reviewed: All lab results for the last 24 hours reviewed. Recent Results (from the past 24 hour(s))   GLUCOSE, POC    Collection Time: 07/27/18  1:28 PM   Result Value Ref Range    Glucose (POC) 106 70 - 110 mg/dL   CBC WITH AUTOMATED DIFF    Collection Time: 07/27/18  1:35 PM   Result Value Ref Range    WBC 5.1 4.6 - 13.2 K/uL    RBC 4.03 (L) 4.20 - 5.30 M/uL    HGB 11.2 (L) 12.0 - 16.0 g/dL    HCT 35.1 35.0 - 45.0 %    MCV 87.1 74.0 - 97.0 FL    MCH 27.8 24.0 - 34.0 PG    MCHC 31.9 31.0 - 37.0 g/dL    RDW 16.2 (H) 11.6 - 14.5 %    PLATELET 435 642 - 935 K/uL    MPV 10.2 9.2 - 11.8 FL    NEUTROPHILS 48 40 - 73 %    LYMPHOCYTES 45 21 - 52 %    MONOCYTES 6 3 - 10 %    EOSINOPHILS 1 0 - 5 %    BASOPHILS 0 0 - 2 %    ABS. NEUTROPHILS 2.5 1.8 - 8.0 K/UL    ABS. LYMPHOCYTES 2.3 0.9 - 3.6 K/UL    ABS. MONOCYTES 0.3 0.05 - 1.2 K/UL    ABS. EOSINOPHILS 0.0 0.0 - 0.4 K/UL    ABS.  BASOPHILS 0.0 0.0 - 0.1 K/UL    DF AUTOMATED     METABOLIC PANEL, COMPREHENSIVE    Collection Time: 07/27/18  1:35 PM   Result Value Ref Range    Sodium 139 136 - 145 mmol/L    Potassium 3.9 3.5 - 5.5 mmol/L    Chloride 101 100 - 108 mmol/L    CO2 27 21 - 32 mmol/L    Anion gap 11 3.0 - 18 mmol/L    Glucose 105 (H) 74 - 99 mg/dL    BUN 12 7.0 - 18 MG/DL    Creatinine 1.07 0.6 - 1.3 MG/DL    BUN/Creatinine ratio 11 (L) 12 - 20      GFR est AA >60 >60 ml/min/1.73m2    GFR est non-AA 51 (L) >60 ml/min/1.73m2    Calcium 8.9 8.5 - 10.1 MG/DL    Bilirubin, total 0.2 0.2 - 1.0 MG/DL    ALT (SGPT) 29 13 - 56 U/L    AST (SGOT) 22 15 - 37 U/L    Alk.  phosphatase 85 45 - 117 U/L    Protein, total 7.9 6.4 - 8.2 g/dL    Albumin 3.5 3.4 - 5.0 g/dL    Globulin 4.4 (H) 2.0 - 4.0 g/dL    A-G Ratio 0.8 0.8 - 1.7     PROTHROMBIN TIME + INR    Collection Time: 07/27/18  1:35 PM   Result Value Ref Range    Prothrombin time 15.2 11.5 - 15.2 sec    INR 1.3 (H) 0.8 - 1.2     PTT    Collection Time: 07/27/18  1:35 PM   Result Value Ref Range    aPTT 36.8 (H) 23.0 - 36.4 SEC   CARDIAC PANEL,(CK, CKMB & TROPONIN)    Collection Time: 07/27/18  1:35 PM   Result Value Ref Range    CK 76 26 - 192 U/L    CK - MB 1.5 <3.6 ng/ml    CK-MB Index 2.0 0.0 - 4.0 %    Troponin-I, Qt. <0.02 0.00 - 0.06 NG/ML   EKG, 12 LEAD, INITIAL    Collection Time: 07/27/18  1:48 PM   Result Value Ref Range    Ventricular Rate 70 BPM    Atrial Rate 70 BPM    P-R Interval 182 ms    QRS Duration 86 ms    Q-T Interval 420 ms    QTC Calculation (Bezet) 453 ms    Calculated P Axis 75 degrees    Calculated R Axis 67 degrees    Calculated T Axis 63 degrees    Diagnosis       Normal sinus rhythm  Normal ECG  When compared with ECG of 23-JUL-2018 14:25,  No significant change was found         Procedures/imaging: see electronic medical records for all procedures/Xrays and details which were not copied into this note but were reviewed prior to creation of Plan      CC: Radha Castanon MD

## 2018-07-27 NOTE — ED NOTES
Walked into patient's room for hourly rounding and patient was talking in full sentences, with clear speech to . Proceeded to ask patient questions; however, aphasia consistent with primary exam. Will continue to monitor closely.

## 2018-07-27 NOTE — ROUTINE PROCESS
1800    TRANSFER - IN REPORT:    Verbal report received from Carroll Butler RN(name) on Dianne Brown  being received from ED(unit) for routine progression of care      Report consisted of patients Situation, Background, Assessment and   Recommendations(SBAR). Information from the following report(s) SBAR, Kardex, ED Summary, Procedure Summary, Intake/Output, MAR, Accordion, Recent Results and Med Rec Status was reviewed with the receiving nurse. Opportunity for questions and clarification was provided. Assessment completed upon patients arrival to unit and care assumed. 1950 Spoke with Reji Barreto, pt verbalized that she has a headache. Per MD levy to give Tylenol 650 mg PO Q6hrs. Shift Summary- Shift uneventful. Pt NIH 23, however during assessment pt was able to turn from side to side with no assistance, able to bend knees independently and push to assist with getting her sat higher in bed, pt able to grasp cup and take cracker from my hand and feed herself. No difficulty swallowing noted.

## 2018-07-27 NOTE — IP AVS SNAPSHOT
[address] [address] 
[phone] Patient: Beto Gaona MRN: [id number] :1945 About your hospitalization You were admitted on:  2018 You last received care in the:  [address] [place] [address] You were discharged on:  2018 Why you were hospitalized Your primary diagnosis was:  Expressive Aphasia Your diagnoses also included:  Tia (Transient Ischemic Attack), Cva (Cerebral Vascular Accident) (Hcc), Subtherapeutic Anticoagulation, Copd (Chronic Obstructive Pulmonary Disease) (Hcc), Htn (Hypertension), Benign, Hyperlipidemia Follow-up Information Follow up With Details Comments Contact Info Manfred Thompson MD   [address] 
[address] [address] 
[phone] Discharge Orders None A check renaldo indicates which time of day the medication should be taken. My Medications START taking these medications Instructions Each Dose to Equal  
 Morning Noon Evening Bedtime  
 atorvastatin 40 mg tablet Commonly known as:  LIPITOR Your next dose is:  18 Take 1 Tab by mouth nightly. 40 mg  
    
   
   
  
   
  
 butalbital-acetaminophen-caffeine -40 mg per tablet Commonly known as:  [place] Fresh Your next dose is: Take as needed. Take 1 Tab by mouth every six (6) hours as needed for Headache. 1 Tab  
    
   
   
   
  
 divalproex  mg ER tablet Commonly known as:  DEPAKOTE ER Your next dose is:  18 Take 1 Tab by mouth two (2) times a day. 500 mg CONTINUE taking these medications Instructions Each Dose to Equal  
 Morning Noon Evening Bedtime  
 aspirin 81 mg chewable tablet Your next dose is:  18 Take 1 Tab by mouth daily. 81 mg  
    
  
   
   
   
  
 atenolol 100 mg tablet Commonly known as:  TENORMIN Your next dose is:  18 Take 200 mg by mouth daily. 200 mg  
    
  
   
   
   
  
 cyanocobalamin 1,000 mcg tablet Your next dose is: Take as Directed. Take 2,000 mcg by mouth daily. 2000 mcg  
    
   
   
   
  
 diphenhydrAMINE 25 mg capsule Commonly known as:  BENADRYL Your next dose is: Take as needed. Take 1 Cap by mouth every six (6) hours as needed for up to 10 days. 25 mg  
    
   
   
   
  
 DULERA 200-5 mcg/actuation HFA inhaler Generic drug:  mometasone-formoterol Your next dose is: Take as directed Take 2 Puffs by inhalation two (2) times a day. 2 Puff  
    
   
   
   
  
 famotidine 20 mg tablet Commonly known as:  PEPCID Your next dose is:  07/31/18 Take 1 Tab by mouth two (2) times a day for 10 days. 20 mg  
    
  
   
   
   
  
 * gabapentin 300 mg capsule Commonly known as:  NEURONTIN Your next dose is:  07/31/18 Take 300 mg by mouth daily. 300mg am and 600mg pm  
 300 mg  
    
  
   
   
   
  
 * gabapentin 300 mg capsule Commonly known as:  NEURONTIN Your next dose is:  07/30/18 Take 600 mg by mouth every evening. 600 mg  
    
   
   
   
  
  
 glimepiride 4 mg tablet Commonly known as:  AMARYL Your next dose is: Take as directed. Take 4 mg by mouth every morning. 4 mg LANTUS U-100 INSULIN 100 unit/mL injection Generic drug:  insulin glargine Your next dose is: Take as directed. 30 Units by SubCUTAneous route daily. 30 Units  
    
   
   
   
  
 lidocaine 5 % Commonly known as:  Mina Loera Apply patch to the affected area for 12 hours a day and remove for 12 hours a day. losartan-hydroCHLOROthiazide 100-12.5 mg per tablet Commonly known as:  HYZAAR Your next dose is:  07/31/18 Take 1 Tab by mouth daily. 1 Tab  
    
  
   
   
   
  
 metFORMIN 850 mg tablet Commonly known as:  GLUCOPHAGE  
 Your next dose is:  07/31/18 Take 850 mg by mouth three (3) times daily. 850 mg  
    
  
   
   
   
  
 naproxen sodium 220 mg tablet Commonly known as:  Eulalia Eaves Your next dose is: Take as directed. Take 2 Tabs by mouth two (2) times daily (with meals). 440 mg PAMELOR 25 mg capsule Generic drug:  nortriptyline Your next dose is:  07/30/18 Take 100 mg by mouth nightly. 100 mg SINGULAIR 10 mg tablet Generic drug:  montelukast  
Your next dose is: Take as directed. Take 10 mg by mouth every evening. 10 mg  
    
   
   
   
  
 SPIRIVA RESPIMAT 2.5 mcg/actuation inhaler Generic drug:  tiotropium bromide Your next dose is: Take as directed. Take 1 Puff by inhalation two (2) times a day. 1 Puff  
    
   
   
   
  
 warfarin 5 mg tablet Commonly known as:  COUMADIN Your next dose is:  5 mg 7/30/18 Take 5 mg by mouth five (5) days a week. TAKE COUMADIN 7.5 MG X 2 DAYS THEN, TAKE COUMADIN 5 MG DAILY NEXT PROTIME WILL BE ON 12/7/17 AT DR. Apple Medley OFFICE  Indications: DEEP VEIN THROMBOSIS PREVENTION  
 5 mg * Notice: This list has 2 medication(s) that are the same as other medications prescribed for you. Read the directions carefully, and ask your doctor or other care provider to review them with you. STOP taking these medications   
 oxyCODONE-acetaminophen 5-325 mg per tablet Commonly known as:  PERCOCET  
   
  
 simvastatin 20 mg tablet Commonly known as:  ZOCOR Where to Get Your Medications These medications were sent to [id number] [address] [place] [address] Donna Almanzar Phone:  [phone]  
  atorvastatin 40 mg tablet  
 butalbital-acetaminophen-caffeine -40 mg per tablet  
 divalproex  mg ER tablet Discharge Instructions DISCHARGE SUMMARY from Nurse PATIENT INSTRUCTIONS: 
 
 
What to do at Home: 
Recommended activity: Activity as tolerated. *  Please give a list of your current medications to your Primary Care Provider. *  Please update this list whenever your medications are discontinued, doses are 
    changed, or new medications (including over-the-counter products) are added. *  Please carry medication information at all times in case of emergency situations. These are general instructions for a healthy lifestyle: No smoking/ No tobacco products/ Avoid exposure to second hand smoke Surgeon General's Warning:  Quitting smoking now greatly reduces serious risk to your health. Obesity, smoking, and sedentary lifestyle greatly increases your risk for illness A healthy diet, regular physical exercise & weight monitoring are important for maintaining a healthy lifestyle You may be retaining fluid if you have a history of heart failure or if you experience any of the following symptoms:  Weight gain of 3 pounds or more overnight or 5 pounds in a week, increased swelling in our hands or feet or shortness of breath while lying flat in bed. Please call your doctor as soon as you notice any of these symptoms; do not wait until your next office visit. Recognize signs and symptoms of STROKE: 
 
F-face looks uneven A-arms unable to move or move unevenly S-speech slurred or non-existent T-time-call 911 as soon as signs and symptoms begin-DO NOT go Back to bed or wait to see if you get better-TIME IS BRAIN. Warning Signs of HEART ATTACK Call 911 if you have these symptoms: 
? Chest discomfort. Most heart attacks involve discomfort in the center of the chest that lasts more than a few minutes, or that goes away and comes back. It can feel like uncomfortable pressure, squeezing, fullness, or pain. ? Discomfort in other areas of the upper body.  Symptoms can include pain or discomfort in one or both arms, the back, neck, jaw, or stomach. ? Shortness of breath with or without chest discomfort. ? Other signs may include breaking out in a cold sweat, nausea, or lightheadedness. Don't wait more than five minutes to call [address]! Fast action can save your life. Calling 911 is almost always the fastest way to get lifesaving treatment. Emergency Medical Services staff can begin treatment when they arrive  up to an hour sooner than if someone gets to the hospital by car. The discharge information has been reviewed with the patient. The patient verbalized understanding. Discharge medications reviewed with the patient and appropriate educational materials and side effects teaching were provided. ___________________________________________________________________________________________________________________________________ [facility] Announcement We are excited to announce that we are making your provider's discharge notes available to you in TriviaPad. You will see these notes when they are completed and signed by the physician that discharged you from your recent hospital stay. If you have any questions or concerns about any information you see in [employer], please call the Health Information Department where you were seen or reach out to your Primary Care Provider for more information about your plan of care. Introducing [facility]! Tamar Hahn introduces TriviaPad patient portal. Now you can access parts of your medical record, email your doctor's office, and request medication refills online. 1. In your internet browser, go to [email or web address] 2. Click on the First Time User? Click Here link in the Sign In box. You will see the New Member Sign Up page. 3. Enter your TriviaPad Access Code exactly as it appears below. You will not need to use this code after youve completed the sign-up process.  If you do not sign up before the expiration date, you must request a new code. · [employer] Access Code: [email or web address] Expires: 10/21/2018  2:13 PM 
 
4. Enter the last four digits of your Social Security Number (xxxx) and Date of Birth (mm/dd/yyyy) as indicated and click Submit. You will be taken to the next sign-up page. 5. Create a [employer] ID. This will be your [employer] login ID and cannot be changed, so think of one that is secure and easy to remember. 6. Create a [employer] password. You can change your password at any time. 7. Enter your Password Reset Question and Answer. This can be used at a later time if you forget your password. 8. Enter your e-mail address. You will receive e-mail notification when new information is available in [address] 9. Click Sign Up. You can now view and download portions of your medical record. 10. Click the Download Summary menu link to download a portable copy of your medical information. If you have questions, please visit the Frequently Asked Questions section of the [employer] website. Remember, [employer] is NOT to be used for urgent needs. For medical emergencies, dial 911. Now available from your iPhone and Android! Introducing Amilcar Patel As a [employer] patient, I wanted to make you aware of our electronic visit tool called Amilcar Patel. [employer] 24/7 allows you to connect within minutes with a medical provider 24 hours a day, seven days a week via a mobile device or tablet or logging into a secure website from your computer. You can access Amilcar Patel from anywhere in the [place].  
 
A virtual visit might be right for you when you have a simple condition and feel like you just dont want to get out of bed, or cant get away from work for an appointment, when your regular [employer] provider is not available (evenings, weekends or holidays), or when youre out of town and need minor care. Electronic visits cost only $49 and if the [employer] 24/7 provider determines a prescription is needed to treat your condition, one can be electronically transmitted to a nearby pharmacy*. Please take a moment to enroll today if you have not already done so. The enrollment process is free and takes just a few minutes. To enroll, please download the [employer]ikC[employer] gregg to your tablet or phone, or visit [email or web address] to enroll on your computer. And, as an [address] [place] [address] patient with a [employer] account, the results of your visits will be scanned into your electronic medical record and your primary care provider will be able to view the scanned results. We urge you to continue to see your regular [facility] provider for your ongoing medical care. And while your primary care provider may not be the one available when you seek a [email or web address] virtual visit, the peace of mind you get from getting a real diagnosis real time can be priceless. For more information on [email or web address], view our Frequently Asked Questions (FAQs) at [email or web address]. Sincerely, 
 
Darius Hilario MD 
Chief Medical Officer [address] *:  certain medications cannot be prescribed via [email or web address] Unresulted Labs-Please follow up with your PCP about these lab tests Order Current Status XR [facility] VIDEO In process CULTURE, BLOOD Preliminary result CULTURE, BLOOD Preliminary result Providers Seen During Your Hospitalization Provider Specialty Primary office phone Sunita Gregg MD Emergency Medicine [phone] Michael Riddle MD Family Practice [phone] Your Primary Care Physician (PCP) Primary Care Physician Office Phone Office Fax Gonzalo Jha 121 [phone] You are allergic to the following Allergen Reactions Dilaudid (Hydromorphone) Other (comments) Hallucinations Ibuprofen Swelling  
 mouth Recent Documentation Height Weight BMI OB Status Smoking Status 1.626 m 90.7 kg 34.33 kg/m2 Hysterectomy Former Smoker Emergency Contacts Name Discharge Info Relation Home Work Mobile [address] CAREGIVER [3] Spouse [3] [phone] Patient Belongings The following personal items are in your possession at time of discharge: 
     Visual Aid: None          Jewelry: Ring (rings x4 on patient's fingers) Please provide this summary of care documentation to your next provider. Signatures-by signing, you are acknowledging that this After Visit Summary has been reviewed with you and you have received a copy. Patient Signature:  ____________________________________________________________ Date:  ____________________________________________________________  
  
[facility] Provider Signature:  ____________________________________________________________ Date:  ____________________________________________________________

## 2018-07-27 NOTE — ED TRIAGE NOTES
Pt brought into er from home with  for complaints of possible stroke. Pt called  20 minutes prior to arrival with complaints of right sided weakness.  put patient in the car 5 minutes prior to arrival patient was unable to speak.

## 2018-07-27 NOTE — ED PROVIDER NOTES
EMERGENCY DEPARTMENT HISTORY AND PHYSICAL EXAM    Date: 7/27/2018  Patient Name: Ying Navas    History of Presenting Illness     Chief Complaint   Patient presents with    Aphasia         History Provided By: Patient's     Chief Complaint: right sided weakness  Duration: 20 Minutes  Timing:  Acute and Constant  Location: right side  Quality: weak  Associated Symptoms: difficult speech and right sided pain. Additional History (Context):   1:27 PM  Ying Navas is a 71 y.o. female with PMHX of HTN, asthma, MI, neuropathy, HLD, menopause, COPD, DM, and sciatica who presents to the emergency department with  C/O right sided weakness onset 20 minutes ago. Associated sxs include difficult speech and right sided pain. Pt is nonverbal and is communicating through blinking her eyes. Pt had surgery recently on right side.  brought her in when he realized that something was wrong. PSHx of knee replacement, foot/toe surgery, hemorrhoidectomy, carpal tunnel release, and hysterectomy. Allergies reported to Ibuprofen. Pt is on Warfarin and ASA. Past EMR reports that pt is a former smoker, a non EtOH user, and a non recreational drug user. Pt denies fall and any other sxs or complaints.      PCP: Saskia Veliz MD    Current Facility-Administered Medications   Medication Dose Route Frequency Provider Last Rate Last Dose    [START ON 7/28/2018] aspirin chewable tablet 81 mg  81 mg Oral DAILY Mindy Gonzales PA-C        [START ON 7/28/2018] umeclidinium (INCRUSE ELLIPTA) 62.5 mcg/actuation  1 Puff Inhalation DAILY Mindy Gonzales PA-C        sodium chloride (NS) flush 5-10 mL  5-10 mL IntraVENous Q8H Mindy Gonzales PA-C        sodium chloride (NS) flush 5-10 mL  5-10 mL IntraVENous PRN Mindy Gonzales PA-C        insulin lispro (HUMALOG) injection   SubCUTAneous AC&HS Mindy Gonzales PA-C        glucose chewable tablet 16 g  4 Tab Oral PRN Mindy Gonzales PA-C        glucagon (GLUCAGEN) injection 1 mg  1 mg IntraMUSCular PRN Petty Sweet PA-C        dextrose (D50W) injection syrg 12.5-25 g  25-50 mL IntraVENous PRN Petty Sweet PA-C        [facility] 4 mg  4 mg IntraVENous Q6H PRN Petty Sweet PA-C        labetalol (NORMODYNE;TRANDATE) injection 5 mg  5 mg IntraVENous Q10MIN PRN Petty Sweet PA-C        famotidine (PEPCID) tablet 20 mg  20 mg Oral BID Petty Sweet PA-C        0.9% sodium chloride infusion  100 mL/hr IntraVENous CONTINUOUS Matt Vicente MD        atorvastatin (LIPITOR) tablet 40 mg  40 mg Oral QHS Petty Sweet PA-C        enoxaparin (LOVENOX) injection 40 mg  40 mg SubCUTAneous Q24H Petty Sweet PA-C         Current Outpatient Prescriptions   Medication Sig Dispense Refill    diphenhydrAMINE (BENADRYL) 25 mg capsule Take 1 Cap by mouth every six (6) hours as needed for up to 10 days. 20 Cap 0    famotidine (PEPCID) 20 mg tablet Take 1 Tab by mouth two (2) times a day for 10 days. 20 Tab 0    warfarin (COUMADIN) 5 mg tablet Take 5 mg by mouth five (5) days a week. TAKE COUMADIN 7.5 MG X 2 DAYS THEN, TAKE COUMADIN 5 MG DAILY NEXT PROTIME WILL BE ON 12/7/17 AT DR. Tona Romero OFFICE  Indications: DEEP VEIN THROMBOSIS PREVENTION      oxyCODONE-acetaminophen (PERCOCET) 5-325 mg per tablet Take 1 Tab by mouth every four (4) hours as needed for Pain. Max Daily Amount: 6 Tabs. Do NOT take with norco 20 Tab 0    lidocaine (LIDODERM) 5 % Apply patch to the affected area for 12 hours a day and remove for 12 hours a day. 15 Each 0    naproxen sodium (ALEVE) 220 mg tablet Take 2 Tabs by mouth two (2) times daily (with meals). 20 Tab 0    atenolol (TENORMIN) 100 mg tablet Take 200 mg by mouth daily.  gabapentin (NEURONTIN) 300 mg capsule Take 300 mg by mouth daily. 300mg am and 600mg pm      gabapentin (NEURONTIN) 300 mg capsule Take 600 mg by mouth every evening.       losartan-hydroCHLOROthiazide (HYZAAR) 100-12.5 mg per tablet Take 1 Tab by mouth daily.  simvastatin (ZOCOR) 20 mg tablet Take 20 mg by mouth nightly.  nortriptyline (PAMELOR) 25 mg capsule Take 100 mg by mouth nightly.  mometasone-formoterol (DULERA) 200-5 mcg/actuation HFA inhaler Take 2 Puffs by inhalation two (2) times a day.  insulin glargine (LANTUS) 100 unit/mL injection 30 Units by SubCUTAneous route daily.  montelukast (SINGULAIR) 10 mg tablet Take 10 mg by mouth every evening.  cyanocobalamin 1,000 mcg tablet Take 2,000 mcg by mouth daily.  glimepiride (AMARYL) 4 mg tablet Take 4 mg by mouth every morning.  aspirin 81 mg chewable tablet Take 1 Tab by mouth daily. 30 Tab 0    tiotropium bromide (SPIRIVA RESPIMAT) 2.5 mcg/actuation mist Take 1 Puff by inhalation two (2) times a day.  metformin (GLUCOPHAGE) 850 mg tablet Take 850 mg by mouth three (3) times daily. Past History     Past Medical History:  Past Medical History:   Diagnosis Date    Asthma     [facility]     Diabetes [facility]     HTN (hypertension), benign     Hyperlipidemia     Menopause     Myocardial infarction (HCC)     Neuropathy     Sciatica     Stroke [facility])        Past Surgical History:  Past Surgical History:   Procedure Laterality Date    FOOT/TOES SURGERY PROC UNLISTED      HX CARPAL TUNNEL RELEASE      HX HEMORRHOIDECTOMY      HX HYSTERECTOMY      Age 40    HX KNEE REPLACEMENT         Family History:  Family History   Problem Relation Age of Onset   Vargas Jennifer Breast Cancer Mother     Breast Cancer Sister        Social History:  Social History   Substance Use Topics    Smoking status: Former Smoker     Packs/day: 0.30     Years: 5.00     Quit date: 3/17/2005    Smokeless tobacco: Never Used    Alcohol use No       Allergies:   Allergies   Allergen Reactions    Dilaudid [Hydromorphone] Other (comments)     Hallucinations      Ibuprofen Swelling     mouth         Review of Systems Review of Systems   Unable to perform ROS: Patient nonverbal   Musculoskeletal:        (+) Right sided pain   Neurological: Positive for speech difficulty (aphasia) and weakness. All other systems reviewed and are negative. Physical Exam     Vitals:    07/27/18 1347 07/27/18 1348 07/27/18 1500 07/27/18 1600   BP: 159/74  (!) 147/106 164/54   Pulse: 69  80 83   Resp: 18  21 21   Temp: 99 °F (37.2 °C) 99.2 °F (37.3 °C)  99 °F (37.2 °C)   SpO2: 100%  99% 99%   Height:         Physical Exam   Constitutional: She is oriented to person, place, and time. She appears well-developed and well-nourished. Tearful. Expressive aphasia. HENT:   Head: Normocephalic and atraumatic. Right Ear: External ear normal.   Left Ear: External ear normal.   Nose: Nose normal.   Mouth/Throat: Oropharynx is clear and moist.   Eyes: Conjunctivae and EOM are normal. Pupils are equal, round, and reactive to light. Right eye exhibits no discharge. Left eye exhibits no discharge. No scleral icterus. Neck: Normal range of motion. Neck supple. No JVD present. No tracheal deviation present. Cardiovascular: Normal rate, regular rhythm, normal heart sounds and intact distal pulses. Pulmonary/Chest: Effort normal and breath sounds normal.   Abdominal: Soft. Bowel sounds are normal. She exhibits no distension. There is no tenderness. No HSM   Musculoskeletal: Normal range of motion. Neurological: She is alert and oriented to person, place, and time. She has normal reflexes. 4/5 weakness on right side. Expressive aphasia. Skin: Skin is warm and dry. No rash noted. Psychiatric: She has a normal mood and affect. Her behavior is normal.   Nursing note and vitals reviewed.         Diagnostic Study Results     Labs -     Recent Results (from the past 12 hour(s))   GLUCOSE, POC    Collection Time: 07/27/18  1:28 PM   Result Value Ref Range    Glucose (POC) 106 70 - 110 mg/dL   CBC WITH AUTOMATED DIFF    Collection Time: 07/27/18 1:35 PM   Result Value Ref Range    WBC 5.1 4.6 - 13.2 K/uL    RBC 4.03 (L) 4.20 - 5.30 M/uL    HGB 11.2 (L) 12.0 - 16.0 g/dL    HCT 35.1 35.0 - 45.0 %    MCV 87.1 74.0 - 97.0 FL    MCH 27.8 24.0 - 34.0 PG    MCHC 31.9 31.0 - 37.0 g/dL    RDW 16.2 (H) 11.6 - 14.5 %    PLATELET 268 520 - 038 K/uL    MPV 10.2 9.2 - 11.8 FL    NEUTROPHILS 48 40 - 73 %    LYMPHOCYTES 45 21 - 52 %    MONOCYTES 6 3 - 10 %    EOSINOPHILS 1 0 - 5 %    BASOPHILS 0 0 - 2 %    ABS. NEUTROPHILS 2.5 1.8 - 8.0 K/UL    ABS. LYMPHOCYTES 2.3 0.9 - 3.6 K/UL    ABS. MONOCYTES 0.3 0.05 - 1.2 K/UL    ABS. EOSINOPHILS 0.0 0.0 - 0.4 K/UL    ABS. BASOPHILS 0.0 0.0 - 0.1 K/UL    DF AUTOMATED     METABOLIC PANEL, COMPREHENSIVE    Collection Time: 07/27/18  1:35 PM   Result Value Ref Range    Sodium 139 136 - 145 mmol/L    Potassium 3.9 3.5 - 5.5 mmol/L    Chloride 101 100 - 108 mmol/L    CO2 27 21 - 32 mmol/L    Anion gap 11 3.0 - 18 mmol/L    Glucose 105 (H) 74 - 99 mg/dL    BUN 12 7.0 - 18 MG/DL    Creatinine 1.07 0.6 - 1.3 MG/DL    BUN/Creatinine ratio 11 (L) 12 - 20      GFR est AA >60 >60 ml/min/1.73m2    GFR est non-AA 51 (L) >60 ml/min/1.73m2    Calcium 8.9 8.5 - 10.1 MG/DL    Bilirubin, total 0.2 0.2 - 1.0 MG/DL    ALT (SGPT) 29 13 - 56 U/L    AST (SGOT) 22 15 - 37 U/L    Alk.  phosphatase 85 45 - 117 U/L    Protein, total 7.9 6.4 - 8.2 g/dL    Albumin 3.5 3.4 - 5.0 g/dL    Globulin 4.4 (H) 2.0 - 4.0 g/dL    A-G Ratio 0.8 0.8 - 1.7     PROTHROMBIN TIME + INR    Collection Time: 07/27/18  1:35 PM   Result Value Ref Range    Prothrombin time 15.2 11.5 - 15.2 sec    INR 1.3 (H) 0.8 - 1.2     PTT    Collection Time: 07/27/18  1:35 PM   Result Value Ref Range    aPTT 36.8 (H) 23.0 - 36.4 SEC   CARDIAC PANEL,(CK, CKMB & TROPONIN)    Collection Time: 07/27/18  1:35 PM   Result Value Ref Range    CK 76 26 - 192 U/L    CK - MB 1.5 <3.6 ng/ml    CK-MB Index 2.0 0.0 - 4.0 %    Troponin-I, Qt. <0.02 0.00 - 0.06 NG/ML   EKG, 12 LEAD, INITIAL    Collection Time: 07/27/18  1:48 PM   Result Value Ref Range    Ventricular Rate 70 BPM    Atrial Rate 70 BPM    P-R Interval 182 ms    QRS Duration 86 ms    Q-T Interval 420 ms    QTC Calculation (Bezet) 453 ms    Calculated P Axis 75 degrees    Calculated R Axis 67 degrees    Calculated T Axis 63 degrees    Diagnosis       Normal sinus rhythm  Normal ECG  When compared with ECG of 23-JUL-2018 14:25,  No significant change was found         Radiologic Studies -     CT Results  (Last 48 hours)               07/27/18 1337  CT HEAD WO CONT Final result    Impression:  IMPRESSION:           1. No acute intracranial abnormality. Stable examination. 2. Moderate subcortical and periventricular white matter hypoattenuation;   unchanged and nonspecific, likely reflecting sequela of chronic ischemic   microvascular change. Critical result: CODE S exam findings conveyed to physician's assistant Ana Lilia Vazquez   in the ED prior to dictation at 1:45 PM on 7/27/2018. Narrative:  EXAM: CT head       INDICATION: Right-sided weakness. Difficulty speaking. COMPARISON: CT head July 23, 2018       TECHNIQUE: Axial CT imaging of the head was performed without intravenous   contrast.       One or more dose reduction techniques were used on this CT: automated exposure   control, adjustment of the mAs and/or kVp according to patient's size, and   iterative reconstruction techniques. The specific techniques utilized on this CT   exam have been documented in the patient's electronic medical record.        _______________       FINDINGS:       BRAIN AND POSTERIOR FOSSA: Cortical sulci volume remains within normal limits   for patient age. Ventricular size and configuration is stable. Basilar cisterns   remain patent. Physiologic bilateral basal ganglia calcifications   redemonstrated, unchanged. Moderate subcortical and periventricular white matter   hypoattenuation similar to prior examination.  There is no intracranial   hemorrhage, mass effect, or midline shift. The gray-white matter differentiation   is within normal limits. EXTRA-AXIAL SPACES AND MENINGES: There are no abnormal extra-axial fluid   collections. CALVARIUM: Intact. SINUSES: Imaged paranasal sinuses and mastoid air cells are clear. OTHER: Atherosclerotic vascular calcifications of the carotid siphons are   present bilaterally. _______________               CXR Results  (Last 48 hours)               07/27/18 1419  XR CHEST PORT Final result    Impression:  Impression:   No radiographic evidence of an acute abnormality. Narrative:  Chest, single view       Indication: Right-sided weakness with difficulty speaking. Possible stroke. Comparison: Several prior exams, most recently July 23, 2018       Findings:  Portable upright AP view of the chest was obtained. The   cardiomediastinal silhouette is within normal limits. The pulmonary vasculature   is unremarkable. Lung parenchyma is well aerated, without focal consolidation. Calcified granuloma right lung base, unchanged. No pleural effusion nor   pneumothorax. No acute osseous abnormality.                  Medications given in the ED-  Medications   aspirin chewable tablet 81 mg (not administered)   umeclidinium (INCRUSE ELLIPTA) 62.5 mcg/actuation (not administered)   sodium chloride (NS) flush 5-10 mL (not administered)   sodium chloride (NS) flush 5-10 mL (not administered)   insulin lispro (HUMALOG) injection (not administered)   glucose chewable tablet 16 g (not administered)   glucagon (GLUCAGEN) injection 1 mg (not administered)   dextrose (D50W) injection syrg 12.5-25 g (not administered)   ondansetron (ZOFRAN) injection 4 mg (not administered)   labetalol (NORMODYNE;TRANDATE) injection 5 mg (not administered)   famotidine (PEPCID) tablet 20 mg (not administered)   0.9% sodium chloride infusion (not administered)   atorvastatin (LIPITOR) tablet 40 mg (not administered)   enoxaparin (LOVENOX) injection 40 mg (not administered)   sodium chloride 0.9 % bolus infusion 1,000 mL (1,000 mL IntraVENous New Bag 7/27/18 1352)   aspirin chewable tablet 243 mg (243 mg Oral Given 7/27/18 1422)         Medical Decision Making   I am the first provider for this patient. I reviewed the vital signs, available nursing notes, past medical history, past surgical history, family history and social history. Vital Signs-Reviewed the patient's vital signs. Pulse Oximetry Analysis - 95% on room air     Cardiac Monitor:  Rate: 75 bpm  Rhythm: sinus rhythm    EKG interpretation: (Preliminary)  1:48 PM   NSR at 70 bpm. Negative STEMI. EKG read by Tigist Martines MD at 1:51 PM     Records Reviewed: Nursing Notes and Old Medical Records  Pt had a CT head on 7/23/2018 with nothing acute. Chronic white matter changes shown. Provider Notes (Medical Decision Making): DDx: TIA, CVA, CNS bleed, metabolic, mass    Procedures:  Procedures    ED Course:   1:27 PM Initial assessment performed. The patients presenting problems have been discussed, and they are in agreement with the care plan formulated and outlined with them. I have encouraged them to ask questions as they arise throughout their visit. 1:36 PM Discussed patient's history, exam, and available diagnostics results with Dr. Melissa Allen, teleneurology, who agree with consulting pt and will evaluate.    2:01 PM Bang Yanez will speak with family about possible tPA for pt.    2:15 PM Dr. Bang Yanez called stating that the son does not want tPA. INR is subtherapeutic so recommends  mg, IV fluids, admission, MRI, and UA.    2:18 PM Pt already had ASA 81 mg. Will provide to total of 243 mg of ASA as pt had 81mg aspirin today. Diagnosis and Disposition     Critical Care Time: I have spent 45 minutes of critical care time involved in lab review, consultations with specialist, family decision-making, and documentation.   During this entire length of time I was immediately available to the patient. Critical Care: The reason for providing this level of medical care for this critically ill patient was due a critical illness that impaired one or more vital organ systems such that there was a high probability of imminent or life threatening deterioration in the patients condition. This care involved high complexity decision making to assess, manipulate, and support vital system functions, to treat this degreee vital organ system failure and to prevent further life threatening deterioration of the patients condition. Core Measures:  For Hospitalized Patients:    1. Hospitalization Decision Time:  The decision to hospitalize the patient was made by Kalpesh Reed MD at 1:27 PM on 7/27/2018    2. Aspirin: Aspirin was given    3:36 PM  Patient is being admitted to the hospital by Aj Tillman MD. The results of their tests and reasons for their admission have been discussed with them and/or available family. They convey agreement and understanding for the need to be admitted and for their admission diagnosis. CLINICAL IMPRESSION:    1. Transient cerebral ischemia, unspecified type    2. Cerebrovascular accident (CVA) due to thrombosis of left middle cerebral artery (HCC)    3. Subtherapeutic anticoagulation        PLAN:  1. ADMIT to Telemetry    _______________________________    Attestations: This note is prepared by Crystal Flowers, acting as Scribe for Kalpesh Reed MD.    Kalpesh Reed MD:  The scribe's documentation has been prepared under my direction and personally reviewed by me in its entirety.   I confirm that the note above accurately reflects all work, treatment, procedures, and medical decision making performed by me.  _______________________________

## 2018-07-27 NOTE — PROGRESS NOTES
Speech Therapy Screening:  Services are indicated and therapy order is requested. An InBasket screening referral was triggered for speech therapy based on results obtained during the nursing admission assessment. Patient failed dysphagia screen 2/2 altered vocal quality, admitted with speech difficulty. The patients chart was reviewed and the patient is appropriate for a skilled therapy evaluation. Please order a consult for speech therapy if you are in agreement and would like an evaluation to be completed. Thank you.     Volodymyr Pinedo, SLP

## 2018-07-27 NOTE — IP AVS SNAPSHOT
Summary of Care Report The Summary of Care report has been created to help improve care coordination. Users with access to [employer] or [address] (Web-based application) may access additional patient information including the Discharge Summary. If you are not currently a [address] user and need more information, please call the number listed below in the [place] [address] section and ask to be connected with Medical Records. Facility Information Name Address Phone [address] [place], [place] [address] [phone] Patient Information Patient Name Sex MELODY St ([id number]) Female 1945 Discharge Information Admitting Provider Service Area Unit Mali Melchor MD / [phone] [address]/Med / [phone] Discharge Provider Discharge Date/Time Discharge Disposition Destination (none) 2018 (Pending) HHC (none) Patient Language Language ENGLISH [13] Hospital Problems as of 2018  Reviewed: 2017  6:22 PM by Nabila Dickson DO Class Noted - Resolved Last Modified POA Active Problems HTN (hypertension), benign  Unknown - Present 2018 by August Hughes PA-C Yes Entered by Jodi Mckinnon LPN Hyperlipidemia  Unknown - Present 2018 by August Hughes PA-C Yes Entered by Jodi Mckinnon LPN  
  COPD (chronic obstructive pulmonary disease) [facility] (Chronic)  2017 - Present 2018 by August Hughes PA-C Yes Entered by Nabila Dickson DO  
  TIA (transient ischemic attack)  2018 - Present 2018 by Sapna Perez MD Unknown Entered by Sapna Perez MD  
  * (Principal)Expressive aphasia  2018 - Present 2018 by August Hughes PA-C Unknown   Entered by Sapna Perez MD  
 CVA (cerebral vascular accident) ([facility]  7/27/2018 - Present 7/27/2018 by Nara Herrera MD Unknown Entered by Nara Herrera MD  
  Subtherapeutic anticoagulation  7/27/2018 - Present 7/27/2018 by Nara Herrera MD Unknown Entered by Nara Herrera MD  
  
Non-Hospital Problems as of 7/30/2018  Reviewed: 2/22/2017  6:22 PM by Dionicio Agarwal DO Class Noted - Resolved Last Modified Active Problems Asthma  Unknown - Present 2/22/2017 by Dionicio Agarwal DO Entered by Gila Tong LPN Neuropathy  Unknown - Present 12/8/2015 by Watson Viramontes Entered by Gila Tong LPN Dizziness  12/7/2015 - Present 12/8/2015 by Watson Viramontes Entered by Magdalena Maldonado MD  
  Chest pain in adult  2/22/2017 - Present 2/22/2017 by Dionicio Agarwal DO Entered by Manas Huerta MD  
  
You are allergic to the following Allergen Reactions Dilaudid (Hydromorphone) Other (comments) Hallucinations Ibuprofen Swelling  
 mouth Current Discharge Medication List  
  
START taking these medications Dose & Instructions Dispensing Information Comments  
 atorvastatin 40 mg tablet Commonly known as:  LIPITOR Dose:  40 mg Take 1 Tab by mouth nightly. Quantity:  30 Tab Refills:  0  
   
 butalbital-acetaminophen-caffeine -40 mg per tablet Commonly known as:  José Miguel Hernandez Dose:  1 Tab Take 1 Tab by mouth every six (6) hours as needed for Headache. Quantity:  10 Tab Refills:  0  
   
 divalproex  mg ER tablet Commonly known as:  DEPAKOTE ER Dose:  500 mg Take 1 Tab by mouth two (2) times a day. Quantity:  60 Tab Refills:  0 CONTINUE these medications which have NOT CHANGED Dose & Instructions Dispensing Information Comments  
 aspirin 81 mg chewable tablet Dose:  81 mg Take 1 Tab by mouth daily. Quantity:  30 Tab Refills:  0  
   
 atenolol 100 mg tablet Commonly known as:  TENORMIN Dose:  200 mg Take 200 mg by mouth daily. Refills:  0  
   
 cyanocobalamin 1,000 mcg tablet Dose:  2000 mcg Take 2,000 mcg by mouth daily. Refills:  0  
   
 diphenhydrAMINE 25 mg capsule Commonly known as:  BENADRYL Dose:  25 mg Take 1 Cap by mouth every six (6) hours as needed for up to 10 days. Quantity:  20 Cap Refills:  0 DULERA 200-5 mcg/actuation HFA inhaler Generic drug:  mometasone-formoterol Dose:  2 Puff Take 2 Puffs by inhalation two (2) times a day. Refills:  0  
   
 famotidine 20 mg tablet Commonly known as:  PEPCID Dose:  20 mg Take 1 Tab by mouth two (2) times a day for 10 days. Quantity:  20 Tab Refills:  0  
   
 * gabapentin 300 mg capsule Commonly known as:  NEURONTIN Dose:  300 mg Take 300 mg by mouth daily. 300mg am and 600mg pm  
 Refills:  0  
   
 * gabapentin 300 mg capsule Commonly known as:  NEURONTIN Dose:  600 mg Take 600 mg by mouth every evening. Refills:  0  
   
 glimepiride 4 mg tablet Commonly known as:  AMARYL Dose:  4 mg Take 4 mg by mouth every morning. Refills:  0  
   
 LANTUS U-100 INSULIN 100 unit/mL injection Generic drug:  insulin glargine Dose:  30 Units 30 Units by SubCUTAneous route daily. Refills:  0  
   
 lidocaine 5 % Commonly known as:  Taty Cazares Apply patch to the affected area for 12 hours a day and remove for 12 hours a day. Quantity:  15 Each Refills:  0  
   
 losartan-hydroCHLOROthiazide 100-12.5 mg per tablet Commonly known as:  HYZAAR Dose:  1 Tab Take 1 Tab by mouth daily. Refills:  0  
   
 metFORMIN 850 mg tablet Commonly known as:  GLUCOPHAGE Dose:  850 mg Take 850 mg by mouth three (3) times daily. Refills:  0  
   
 naproxen sodium 220 mg tablet Commonly known as:  [place] Dose:  440 mg Take 2 Tabs by mouth two (2) times daily (with meals). Quantity:  20 Tab Refills:  0 PAMELOR 25 mg capsule Generic drug:  nortriptyline Dose:  100 mg Take 100 mg by mouth nightly. Refills:  0 SINGULAIR 10 mg tablet Generic drug:  montelukast  
 Dose:  10 mg Take 10 mg by mouth every evening. Refills:  0 SPIRIVA RESPIMAT 2.5 mcg/actuation inhaler Generic drug:  tiotropium bromide Dose:  1 Puff Take 1 Puff by inhalation two (2) times a day. Refills:  0  
   
 warfarin 5 mg tablet Commonly known as:  COUMADIN Dose:  5 mg Take 5 mg by mouth five (5) days a week. TAKE COUMADIN 7.5 MG X 2 DAYS THEN, TAKE COUMADIN 5 MG DAILY NEXT PROTIME WILL BE ON 12/7/17 AT DR. Marques [place]il[place] OFFICE  Indications: [address] Refills:  0  
   
 * Notice: This list has 2 medication(s) that are the same as other medications prescribed for you. Read the directions carefully, and ask your doctor or other care provider to review them with you. STOP taking these medications Comments  
 oxyCODONE-acetaminophen 5-325 mg per tablet Commonly known as:  PERCOCET  
   
   
 simvastatin 20 mg tablet Commonly known as:  ZOCOR Current Immunizations Name Date Pneumococcal Vaccine (Unspecified Type) 5/1/2014 Follow-up Information Follow up With Details Comments Contact Info Andrew Rubinstein, MD   [address] [place] [address] 
[address] [facility] [address] [phone] Discharge Instructions DISCHARGE SUMMARY from Nurse PATIENT INSTRUCTIONS: 
 
 
What to do at Home: 
Recommended activity: Activity as tolerated. *  Please give a list of your current medications to your Primary Care Provider. *  Please update this list whenever your medications are discontinued, doses are 
    changed, or new medications (including over-the-counter products) are added. *  Please carry medication information at all times in case of emergency situations. These are general instructions for a healthy lifestyle: No smoking/ No tobacco products/ Avoid exposure to second hand smoke Surgeon General's Warning:  Quitting smoking now greatly reduces serious risk to your health. Obesity, smoking, and sedentary lifestyle greatly increases your risk for illness A healthy diet, regular physical exercise & weight monitoring are important for maintaining a healthy lifestyle You may be retaining fluid if you have a history of heart failure or if you experience any of the following symptoms:  Weight gain of 3 pounds or more overnight or 5 pounds in a week, increased swelling in our hands or feet or shortness of breath while lying flat in bed. Please call your doctor as soon as you notice any of these symptoms; do not wait until your next office visit. Recognize signs and symptoms of STROKE: 
 
F-face looks uneven A-arms unable to move or move unevenly S-speech slurred or non-existent T-time-call 911 as soon as signs and symptoms begin-DO NOT go Back to bed or wait to see if you get better-TIME IS BRAIN. Warning Signs of HEART ATTACK Call 911 if you have these symptoms: 
? Chest discomfort. Most heart attacks involve discomfort in the center of the chest that lasts more than a few minutes, or that goes away and comes back. It can feel like uncomfortable pressure, squeezing, fullness, or pain. ? Discomfort in other areas of the upper body. Symptoms can include pain or discomfort in one or both arms, the back, neck, jaw, or stomach. ? Shortness of breath with or without chest discomfort. ? Other signs may include breaking out in a cold sweat, nausea, or lightheadedness. Don't wait more than five minutes to call [address]! Fast action can save your life. Calling 911 is almost always the fastest way to get lifesaving treatment. Emergency Medical Services staff can begin treatment when they arrive  up to an hour sooner than if someone gets to the hospital by car. The discharge information has been reviewed with the patient. The patient verbalized understanding. Discharge medications reviewed with the patient and appropriate educational materials and side effects teaching were provided. ___________________________________________________________________________________________________________________________________ Chart Review Routing History Recipient Method Report Sent By Brooke Herman MD  
Fax: [phone] Phone: [phone] Fax Provider Comm Report Da Winn [[id number]] 4/27/2011 11:15 AM 03/17/2011

## 2018-07-27 NOTE — ED NOTES
TRANSFER - OUT REPORT:    Verbal report given to RN Allegra (name) on Andres Generous  being transferred to 3 (unit) for routine progression of care       Report consisted of patients Situation, Background, Assessment and   Recommendations(SBAR). Information from the following report(s) SBAR, Kardex, ED Summary, Med Rec Status and Cardiac Rhythm NSR was reviewed with the receiving nurse. Lines:   Peripheral IV 07/27/18 Left Antecubital (Active)   Site Assessment Clean, dry, & intact 7/27/2018  1:46 PM   Phlebitis Assessment 0 7/27/2018  1:46 PM   Infiltration Assessment 0 7/27/2018  1:46 PM   Dressing Status Clean, dry, & intact 7/27/2018  1:46 PM   Dressing Type Transparent;Tape 7/27/2018  1:46 PM   Hub Color/Line Status Pink;Flushed;Patent 7/27/2018  1:46 PM        Opportunity for questions and clarification was provided.       Patient transported with:   Monitor  Registered Nurse

## 2018-07-28 ENCOUNTER — APPOINTMENT (OUTPATIENT)
Dept: CT IMAGING | Age: 73
DRG: 069 | End: 2018-07-28
Attending: PSYCHIATRY & NEUROLOGY
Payer: MEDICARE

## 2018-07-28 LAB
ANION GAP SERPL CALC-SCNC: 6 MMOL/L (ref 3–18)
BUN SERPL-MCNC: 9 MG/DL (ref 7–18)
BUN/CREAT SERPL: 11 (ref 12–20)
CALCIUM SERPL-MCNC: 8.6 MG/DL (ref 8.5–10.1)
CHLORIDE SERPL-SCNC: 104 MMOL/L (ref 100–108)
CHOLEST SERPL-MCNC: 127 MG/DL
CO2 SERPL-SCNC: 29 MMOL/L (ref 21–32)
CREAT SERPL-MCNC: 0.8 MG/DL (ref 0.6–1.3)
ERYTHROCYTE [DISTWIDTH] IN BLOOD BY AUTOMATED COUNT: 16.1 % (ref 11.6–14.5)
EST. AVERAGE GLUCOSE BLD GHB EST-MCNC: 189 MG/DL
GLUCOSE BLD STRIP.AUTO-MCNC: 105 MG/DL (ref 70–110)
GLUCOSE BLD STRIP.AUTO-MCNC: 134 MG/DL (ref 70–110)
GLUCOSE BLD STRIP.AUTO-MCNC: 189 MG/DL (ref 70–110)
GLUCOSE BLD STRIP.AUTO-MCNC: 217 MG/DL (ref 70–110)
GLUCOSE BLD STRIP.AUTO-MCNC: 235 MG/DL (ref 70–110)
GLUCOSE SERPL-MCNC: 96 MG/DL (ref 74–99)
HBA1C MFR BLD: 8.2 % (ref 4.5–5.6)
HCT VFR BLD AUTO: 31.8 % (ref 35–45)
HDLC SERPL-MCNC: 35 MG/DL (ref 40–60)
HDLC SERPL: 3.6 {RATIO} (ref 0–5)
HGB BLD-MCNC: 10.2 G/DL (ref 12–16)
LDLC SERPL CALC-MCNC: 61.6 MG/DL (ref 0–100)
LIPID PROFILE,FLP: ABNORMAL
MCH RBC QN AUTO: 27.9 PG (ref 24–34)
MCHC RBC AUTO-ENTMCNC: 32.1 G/DL (ref 31–37)
MCV RBC AUTO: 87.1 FL (ref 74–97)
PLATELET # BLD AUTO: 257 K/UL (ref 135–420)
PMV BLD AUTO: 9.9 FL (ref 9.2–11.8)
POTASSIUM SERPL-SCNC: 3.5 MMOL/L (ref 3.5–5.5)
RBC # BLD AUTO: 3.65 M/UL (ref 4.2–5.3)
SODIUM SERPL-SCNC: 139 MMOL/L (ref 136–145)
TRIGL SERPL-MCNC: 152 MG/DL (ref ?–150)
VLDLC SERPL CALC-MCNC: 30.4 MG/DL
WBC # BLD AUTO: 4.1 K/UL (ref 4.6–13.2)

## 2018-07-28 PROCEDURE — 74011250636 HC RX REV CODE- 250/636: Performed by: INTERNAL MEDICINE

## 2018-07-28 PROCEDURE — 74011636320 HC RX REV CODE- 636/320: Performed by: HOSPITALIST

## 2018-07-28 PROCEDURE — 82962 GLUCOSE BLOOD TEST: CPT

## 2018-07-28 PROCEDURE — 74011250636 HC RX REV CODE- 250/636: Performed by: PHYSICIAN ASSISTANT

## 2018-07-28 PROCEDURE — 36415 COLL VENOUS BLD VENIPUNCTURE: CPT | Performed by: PHYSICIAN ASSISTANT

## 2018-07-28 PROCEDURE — 74011250636 HC RX REV CODE- 250/636: Performed by: HOSPITALIST

## 2018-07-28 PROCEDURE — 85027 COMPLETE CBC AUTOMATED: CPT | Performed by: PHYSICIAN ASSISTANT

## 2018-07-28 PROCEDURE — 74011000250 HC RX REV CODE- 250: Performed by: HOSPITALIST

## 2018-07-28 PROCEDURE — 83036 HEMOGLOBIN GLYCOSYLATED A1C: CPT | Performed by: PHYSICIAN ASSISTANT

## 2018-07-28 PROCEDURE — 74011250637 HC RX REV CODE- 250/637: Performed by: PHYSICIAN ASSISTANT

## 2018-07-28 PROCEDURE — 65660000000 HC RM CCU STEPDOWN

## 2018-07-28 PROCEDURE — 80061 LIPID PANEL: CPT | Performed by: PHYSICIAN ASSISTANT

## 2018-07-28 PROCEDURE — 80048 BASIC METABOLIC PNL TOTAL CA: CPT | Performed by: PHYSICIAN ASSISTANT

## 2018-07-28 PROCEDURE — 74011636637 HC RX REV CODE- 636/637: Performed by: HOSPITALIST

## 2018-07-28 PROCEDURE — 74011250637 HC RX REV CODE- 250/637: Performed by: HOSPITALIST

## 2018-07-28 PROCEDURE — 74011636637 HC RX REV CODE- 636/637: Performed by: PHYSICIAN ASSISTANT

## 2018-07-28 PROCEDURE — 70498 CT ANGIOGRAPHY NECK: CPT

## 2018-07-28 PROCEDURE — 74011250637 HC RX REV CODE- 250/637: Performed by: INTERNAL MEDICINE

## 2018-07-28 PROCEDURE — 92610 EVALUATE SWALLOWING FUNCTION: CPT

## 2018-07-28 PROCEDURE — 87040 BLOOD CULTURE FOR BACTERIA: CPT | Performed by: HOSPITALIST

## 2018-07-28 PROCEDURE — 97161 PT EVAL LOW COMPLEX 20 MIN: CPT

## 2018-07-28 RX ORDER — INSULIN GLARGINE 100 [IU]/ML
15 INJECTION, SOLUTION SUBCUTANEOUS
Status: DISCONTINUED | OUTPATIENT
Start: 2018-07-28 | End: 2018-07-30 | Stop reason: HOSPADM

## 2018-07-28 RX ORDER — DIPHENHYDRAMINE HCL 25 MG
50 CAPSULE ORAL
Status: COMPLETED | OUTPATIENT
Start: 2018-07-28 | End: 2018-07-28

## 2018-07-28 RX ORDER — HYDROCODONE BITARTRATE AND ACETAMINOPHEN 5; 325 MG/1; MG/1
1 TABLET ORAL
Status: DISCONTINUED | OUTPATIENT
Start: 2018-07-28 | End: 2018-07-29

## 2018-07-28 RX ORDER — HYDRALAZINE HYDROCHLORIDE 20 MG/ML
10 INJECTION INTRAMUSCULAR; INTRAVENOUS
Status: DISPENSED | OUTPATIENT
Start: 2018-07-28 | End: 2018-07-29

## 2018-07-28 RX ORDER — WARFARIN 7.5 MG/1
7.5 TABLET ORAL ONCE
Status: COMPLETED | OUTPATIENT
Start: 2018-07-28 | End: 2018-07-28

## 2018-07-28 RX ADMIN — HYDRALAZINE HYDROCHLORIDE 10 MG: 20 INJECTION INTRAMUSCULAR; INTRAVENOUS at 20:27

## 2018-07-28 RX ADMIN — FAMOTIDINE 20 MG: 20 TABLET ORAL at 10:29

## 2018-07-28 RX ADMIN — INSULIN LISPRO 4 UNITS: 100 INJECTION, SOLUTION INTRAVENOUS; SUBCUTANEOUS at 18:30

## 2018-07-28 RX ADMIN — ENOXAPARIN SODIUM 40 MG: 40 INJECTION, SOLUTION INTRAVENOUS; SUBCUTANEOUS at 18:29

## 2018-07-28 RX ADMIN — ASPIRIN 81 MG 81 MG: 81 TABLET ORAL at 10:29

## 2018-07-28 RX ADMIN — DIPHENHYDRAMINE HYDROCHLORIDE 50 MG: 25 CAPSULE ORAL at 20:27

## 2018-07-28 RX ADMIN — SODIUM CHLORIDE 100 ML/HR: 900 INJECTION, SOLUTION INTRAVENOUS at 07:35

## 2018-07-28 RX ADMIN — ACETAMINOPHEN 650 MG: 325 TABLET, FILM COATED ORAL at 03:11

## 2018-07-28 RX ADMIN — CEFTRIAXONE 1 G: 1 INJECTION, POWDER, FOR SOLUTION INTRAMUSCULAR; INTRAVENOUS at 10:29

## 2018-07-28 RX ADMIN — ATORVASTATIN CALCIUM 40 MG: 20 TABLET, FILM COATED ORAL at 21:56

## 2018-07-28 RX ADMIN — IOPAMIDOL 80 ML: 755 INJECTION, SOLUTION INTRAVENOUS at 12:53

## 2018-07-28 RX ADMIN — INSULIN LISPRO 4 UNITS: 100 INJECTION, SOLUTION INTRAVENOUS; SUBCUTANEOUS at 14:58

## 2018-07-28 RX ADMIN — INSULIN GLARGINE 15 UNITS: 100 INJECTION, SOLUTION SUBCUTANEOUS at 21:56

## 2018-07-28 RX ADMIN — Medication 10 ML: at 07:35

## 2018-07-28 RX ADMIN — ACETAMINOPHEN 650 MG: 325 TABLET, FILM COATED ORAL at 10:41

## 2018-07-28 RX ADMIN — SODIUM CHLORIDE 100 ML/HR: 900 INJECTION, SOLUTION INTRAVENOUS at 23:21

## 2018-07-28 RX ADMIN — UMECLIDINIUM 1 PUFF: 62.5 AEROSOL, POWDER ORAL at 21:02

## 2018-07-28 RX ADMIN — FAMOTIDINE 20 MG: 20 TABLET ORAL at 20:27

## 2018-07-28 RX ADMIN — WARFARIN SODIUM 7.5 MG: 7.5 TABLET ORAL at 18:29

## 2018-07-28 RX ADMIN — Medication 10 ML: at 14:59

## 2018-07-28 NOTE — PROGRESS NOTES
Chart reviewed. Pt admitted for expressive aphasia. CM will follow for transition of care needs. Reason for Admission:    per H&P, pt is a 71 y.o. female who has a past medical history of previous CVA, HTN, dyslipidemia, DM, COPD who presents to the ED today with acute onset of right sided weakness and inability to speak. As she is unable to speak, the patient's  provides the history. He states that after he left for work this morning, he received a call from the patient approximately early afternoon that stated she was having slurred speech and felt like she was unsteady on her feet, bumping into things around the house. He then stated that he felt something wasn't right and brought the patient to the ED. He says he is unsure if the right leg weakness is residual from previous knee surgery in 11/2017. When the patient is asked ROS questions, she is able to respond by nodding or shaking her head. She reports HA, blurred vision, inability to speak, right upper and lower extremity weakness. She denies chest pain, N/V/D, urinary complaints. RRAT Score:     18 mod             Do you (patient/family) have any concerns for transition/discharge? Plan for utilizing home health:     TBD, awaiting PT/OT/Speech eval    Likelihood of readmission?   mod            Transition of Care Plan:      TBD    Care Management Interventions  Transition of Care Consult (CM Consult): Discharge Planning  Physical Therapy Consult: Yes  Occupational Therapy Consult: Yes  Speech Therapy Consult:  Yes

## 2018-07-28 NOTE — PROGRESS NOTES
Problem: Falls - Risk of  Goal: *Absence of Falls  Document Satinder Fall Risk and appropriate interventions in the flowsheet.    Outcome: Progressing Towards Goal  Fall Risk Interventions:  Mobility Interventions: Assess mobility with egress test, Bed/chair exit alarm, Patient to call before getting OOB, Communicate number of staff needed for ambulation/transfer, Utilize walker, cane, or other assistive device         Medication Interventions: Patient to call before getting OOB, Teach patient to arise slowly, Bed/chair exit alarm    Elimination Interventions: Bed/chair exit alarm, Call light in reach, Patient to call for help with toileting needs, Toilet paper/wipes in reach    History of Falls Interventions: Bed/chair exit alarm, Door open when patient unattended, Room close to nurse's station, Investigate reason for fall

## 2018-07-28 NOTE — PROGRESS NOTES
Problem: Dysphagia (Adult)  Goal: *Acute Goals and Plan of Care (Insert Text)  Recommendations:  Diet: regular, thin   Meds: per pt preference  Aspiration Precautions  Oral Care TID  Other: MBS Monday    Goals:  Patient will:  1. Tolerate PO trials with 0 s/s overt distress in 4/5 trials  2. Utilize compensatory swallow strategies/maneuvers (decrease bite/sip, size/rate, alt. liq/sol) with min cues in 4/5 trials  3. Perform oral-motor/laryngeal exercises to increase oropharyngeal swallow function with min cues  4. Complete an objective swallow study (i.e., MBSS) to assess swallow integrity, r/o aspiration, and determine of safest LRD, min A    Outcome: Progressing Towards Goal  Speech LAnguage Pathology bedside swallow evaluation    Patient: Yamila Palma (24 y.o. female)  Date: 7/28/2018  Primary Diagnosis: TIA (transient ischemic attack)  CVA (cerebral vascular accident) ([facility]  Slurred speech  Subtherapeutic anticoagulation        Precautions: aspiration  Fall    PLOF: independent    ASSESSMENT :  Based on the objective data described below, the patient presents with mild pharyngeal dysphagia. Oral motor exam WFL. Patient A&OX4. Patient reports speech and language back to baseline. No deficits observed with speech or language at this time. Patient with thin liquids, puree, and solid trials with inconsistent throat clearing throughout evaluation. No consistent consistency causing throat clears. Patient reports \"it's like its a burp, coming back up. \" Patient with mild swallow delay with puree trial, however was able to swallow bolus effectively. Patient educated on importance of safe swallowing guidelines (small bites/sips, decreased rate, alt solids/liquids, HOB >60 for all PO intake); verbalized and demonstrated comprehension. Will complete MBSS on Monday per MD orders.  Continue regular, thin liquid diet with safe swallowing guidelines    Patient will benefit from skilled intervention to address the above impairments. Patients rehabilitation potential is considered to be Good  Factors which may influence rehabilitation potential include:   [x]            None noted  []            Mental ability/status  []            Medical condition  []            Home/family situation and support systems  []            Safety awareness  []            Pain tolerance/management  []            Other:      PLAN :  Recommendations and Planned Interventions:  Regular, thin  Frequency/Duration: Patient will be followed by speech-language pathology 1-2 times per day/4-7 days per week to address goals. Discharge Recommendations: Outpatient, To Be Determined and None     SUBJECTIVE:   Patient stated It feels like there is a lump in my throat.     OBJECTIVE:     Past Medical History:   Diagnosis Date    Asthma     [facility] pulmonary [facility]     Diabetes ([facility]     HTN (hypertension), benign     Hyperlipidemia     Menopause     Myocardial infarction (HCC)     Neuropathy     Sciatica     Stroke [facility])      Past Surgical History:   Procedure Laterality Date    FOOT/TOES SURGERY PROC UNLISTED      HX CARPAL TUNNEL RELEASE      HX HEMORRHOIDECTOMY      HX HYSTERECTOMY      Age 40    HX KNEE REPLACEMENT       Prior Level of Function/Home Situation: independent  Home Situation  Home Environment: Private residence  # Steps to Enter: 0  One/Two Story Residence: Two story  # of Interior Steps: 12  Interior Rails: Both  Lift Chair Available: No  Living Alone: No  Support Systems: Spouse/Significant Other/Partner  Patient Expects to be Discharged to[de-identified] Private residence  Diet prior to admission: regular, thin  Current Diet:  Regular, thin   Cognitive and Communication Status:  Neurologic State: Alert  Orientation Level: Oriented X4  Cognition: Appropriate decision making  Perception: Appears intact  Perseveration: No perseveration noted  Safety/Judgement: Awareness of environment  Oral Assessment:  Oral Assessment  Labial: No impairment  Dentition: Intact  Oral Hygiene: good  Lingual: No impairment  Velum: No impairment  Mandible: No impairment  Gag Reflex: Present  P.O. Trials:  Patient Position: HOB60  Vocal quality prior to P.O.: No impairment  Consistency Presented: Thin liquid;Puree; Solid  How Presented: SLP-fed/presented;Straw;Successive swallows;Spoon  How Much: 12  Bolus Acceptance: No impairment  Bolus Formation/Control: No impairment     Propulsion: No impairment  Oral Residue: None  Initiation of Swallow: Delayed (# of seconds)  Laryngeal Elevation: Functional  Aspiration Signs/Symptoms: Clear throat  Pharyngeal Phase Characteristics: Suspected pharyngeal residue  Effective Modifications: Alternate liquids/solids;Small sips and bites  Cues for Modifications: Minimal       Oral Phase Severity: No impairment  Pharyngeal Phase Severity : Mild    GCODESwallowing:  Swallow Current Status CI= 1-19%   Swallow Goal Status CI= 1-19%    The severity rating is based on the following outcomes:  CHARLEY Noms Swallow    Clinical Judgement    PAIN:  Start of Eval: 0  End of Eval: 0     After treatment:   []            Patient left in no apparent distress sitting up in chair  [x]            Patient left in no apparent distress in bed  [x]            Call bell left within reach  [x]            Nursing notified  []            Family present  []            Caregiver present  []            Bed alarm activated    COMMUNICATION/EDUCATION:   [x]            Aspiration precautions; swallow safety; compensatory techniques. []            Patient/family have participated as able in goal setting and plan of care. []            Patient/family agree to work toward stated goals and plan of care. [x]            Patient understands intent and goals of therapy; neutral about participation.   []            Patient unable to participate in goal setting/plan of care; educ ongoing with interdisciplinary staff  []         [place] safety precautions in patient's room.     Thank you for this referral.  Diego Morales  [facility] SLP  Time Calculation: 12 mins

## 2018-07-28 NOTE — PROGRESS NOTES
Problem: Pressure Injury - Risk of  Goal: *Prevention of pressure injury  Document Ryne Scale and appropriate interventions in the flowsheet.    Outcome: Progressing Towards Goal  Pressure Injury Interventions:  Sensory Interventions: Assess changes in LOC, Check visual cues for pain, Keep linens dry and wrinkle-free         Activity Interventions: Increase time out of bed, Pressure redistribution bed/mattress(bed type)         Nutrition Interventions: Document food/fluid/supplement intake, Offer support with meals,snacks and hydration

## 2018-07-28 NOTE — PROGRESS NOTES
Hospitalist Progress Note    Patient: Sherman Ramirez MRN: [id number]  CSN: [id number]    YOB: 1945  Age: 71 y.o. Sex: female    DOA: 7/27/2018 LOS:  LOS: 1 day          Chief Complaint:    TIA      Assessment/Plan     72 yo female with expressive aphasia upon admission and right side weakness-now resolved-MRI brain neg for acute CVA    TIA-probable in setting of UTI, cerebrovasc disease  Diabetes, ins dependent  HTN  Diabetic neuropathy  Asthma  UTI  On coumadin for hx of clots-subtherapeutic level INR    norco low dose for HA  Start IV rocephin, send urine cx  Continue IVF for today  Daily asa and statin  Echo and CTA neck planned    Pharmacy consulted for warfarin mgmt    discussed with neurology  She will need cognitive and further testing as outpatient- describes numerous issues with \"meds\" and side effects and mental status changes over last year-perha[s there is an early dementia pattern? ?    PT consulted  Tele monitoring  folow BG levels    Discussed with  as well  Disposition :expect d/c home tomorrow if stable  Patient Active Problem List   Diagnosis Code    HTN (hypertension), benign I10    Asthma J45.909    Neuropathy G62.9    Hyperlipidemia E78.5    Dizziness R42    Chest pain in adult R07.9    COPD (chronic obstructive pulmonary disease) ([facility] J44.9    TIA (transient ischemic attack) G45.9    Expressive aphasia R47.01    CVA (cerebral vascular accident) ([facility] I63.9    Subtherapeutic anticoagulation Z51.81, Z79.01       Subjective:      Speech back to normal  Right side better  C/o frontal and posterior headache  No sinus congestion  No blurrre vision    Review of systems:    Constitutional: denies fevers, chills, myalgias  Respiratory: denies SOB, cough  Cardiovascular: denies chest pain, palpitations  Gastrointestinal: denies nausea, vomiting, diarrhea      Vital signs/Intake and Output:  Visit Vitals    /71 (BP 1 Location: Left arm, BP Patient Position: At rest;Supine; Head of bed elevated (Comment degrees))    Pulse 64    Temp 98.6 °F (37 °C)    Resp 16    Ht 5' 4\" (1.626 m)    SpO2 98%     Current Shift:  07/28 0701 - 07/28 1900  In: 200 [P.O.:200]  Out: 400 [Urine:400]  Last three shifts:  07/26 1901 - 07/28 0700  In: 0   Out: 300 [Urine:300]    Exam:    General: Well developed, obese BF,  alert, NAD, OX3  Head/Neck: NCAT, supple, No masses, No lymphadenopathy  CVS:Regular rate and rhythm, no M/R/G, S1/S2 heard, no thrill  Lungs:Clear to auscultation bilaterally, no wheezes, rhonchi, or rales  Abdomen: Soft, Nontender, No distention, Normal Bowel sounds, No hepatomegaly  Extremities: No C/C/E, pulses palpable 2+  Neuro:grossly normal , follows commands  Psych:appropriate                Labs: Results:       Chemistry Recent Labs      07/28/18   0606  07/27/18   1335   GLU  96  105*   NA  139  139   K  3.5  3.9   CL  104  101   CO2  29  27   BUN  9  12   CREA  0.80  1.07   CA  8.6  8.9   AGAP  6  11   BUCR  11*  11*   AP   --   85   TP   --   7.9   ALB   --   3.5   GLOB   --   4.4*   AGRAT   --   0.8      CBC w/Diff Recent Labs      07/28/18   0606  07/27/18   1335   WBC  4.1*  5.1   RBC  3.65*  4.03*   HGB  10.2*  11.2*   HCT  31.8*  35.1   PLT  257  284   GRANS   --   48   LYMPH   --   45   EOS   --   1      Cardiac Enzymes Recent Labs      07/27/18   1335   CPK  76   CKND1  2.0      Coagulation Recent Labs      07/27/18   1335   PTP  15.2   INR  1.3*   APTT  36.8*       Lipid Panel Lab Results   Component Value Date/Time    Cholesterol, total 127 07/28/2018 06:06 AM    HDL Cholesterol 35 (L) 07/28/2018 06:06 AM    LDL, calculated 61.6 07/28/2018 06:06 AM    VLDL, calculated 30.4 07/28/2018 06:06 AM    Triglyceride 152 (H) 07/28/2018 06:06 AM    CHOL/HDL Ratio 3.6 07/28/2018 06:06 AM      BNP No results for input(s): BNPP in the last 72 hours.    Liver Enzymes Recent Labs      07/27/18   1335   TP  7.9   ALB  3.5   AP  85   SGOT  22      Thyroid Studies Lab Results   Component Value Date/Time    TSH 1.40 07/27/2018 01:35 PM        Procedures/imaging: see electronic medical records for all procedures/Xrays and details which were not copied into this note but were reviewed prior to creation of Marcus Rivas MD

## 2018-07-28 NOTE — PROGRESS NOTES
Problem: Mobility Impaired (Adult and Pediatric)  Goal: *Acute Goals and Plan of Care (Insert Text)  Physical Therapy Goals  Initiated 7/28/2018 and to be accomplished within 3-7 day(s)  1. Patient will move from supine to sit and sit to supine  in bed with supervision/set-up. 2.  Patient will transfer from bed to chair and chair to bed with supervision/set-up using the least restrictive device. 3.  Patient will perform sit to stand with supervision/set-up. 4.  Patient will ambulate with supervision/set-up for 150 feet with the least restrictive device. 5.  Patient will ascend/descend 16 stairs with B handrail(s) with supervision/set-up. Outcome: Progressing Towards Goal  physical Therapy EVALUATION    Patient: Rashawn Rosas (83 y.o. female)  Date: 7/28/2018  Primary Diagnosis: TIA (transient ischemic attack)  CVA (cerebral vascular accident) ([address]  Slurred speech  Subtherapeutic anticoagulation  Precautions:   Fall    ASSESSMENT :  Based on the objective data described below, the patient presents with lower extremity weakness, decreased gait quality and endurance, impaired bed mobility and transfers, and overall limitations in functional mobility. Pt reports she has RW at home and use only when needed. Pt performed sit to stand with supervision. Patient ambulated 100 feet with RW, GB applied, CGA for safety; pt with decreased step length and sam, R knee, back and foot pain. Pt c/o of headache throughout session. Pt reported dizziness after ~50 ft of amb, slightly improved after standing rest break. Patient would benefit from skilled inpatient physical therapy to address deficits, progress as tolerated to achieve long term goals and allow safe discharge. Patient will benefit from skilled intervention to address the above impairments.   Patients rehabilitation potential is considered to be Fair  Factors which may influence rehabilitation potential include:   []         None noted  []         Mental ability/status  [x]         Medical condition  []         Home/family situation and support systems  []         Safety awareness  [x]         Pain tolerance/management  []         Other:      PLAN :  Recommendations and Planned Interventions:  [x]           Bed Mobility Training             [x]    Neuromuscular Re-Education  [x]           Transfer Training                   []    Orthotic/Prosthetic Training  [x]           Gait Training                          []    Modalities  [x]           Therapeutic Exercises          []    Edema Management/Control  [x]           Therapeutic Activities            [x]    Patient and Family Training/Education  []           Other (comment):    Frequency/Duration: Patient will be followed by physical therapy 1-2 times per day to address goals. Discharge Recommendations: Home Health  Further Equipment Recommendations for Discharge: N/A     SUBJECTIVE:   Patient stated I am feeling ok.     OBJECTIVE DATA SUMMARY:     Past Medical History:   Diagnosis Date    Asthma     Chronic obstructive pulmonary disease (HCC)     Diabetes [facility]     HTN (hypertension), benign     Hyperlipidemia     Menopause     Myocardial infarction ([facility]     Neuropathy     Sciatica     Stroke [facility])      Past Surgical History:   Procedure Laterality Date    FOOT/TOES SURGERY PROC UNLISTED      HX CARPAL TUNNEL RELEASE      HX HEMORRHOIDECTOMY      HX HYSTERECTOMY      Age 40    HX KNEE REPLACEMENT       Barriers to Learning/Limitations: None  Compensate with: N/A  Prior Level of Function/Home Situation: Independent amb c/SPC, occasional use of RW  Home Situation  Home Environment: Private residence  # Steps to Enter: 0  One/Two Story Residence: Two story  # of Interior Steps: 12  Interior Rails: Both  Lift Chair Available: No  Living Alone: No  Support Systems: Spouse/Significant Other/Partner  Patient Expects to be Discharged to[de-identified] Private residence  Critical Behavior:  Neurologic State: Alert  Orientation Level: Oriented X4  Cognition: Follows commands  Strength:    Strength: Generally decreased, functional  Tone & Sensation:   Tone: Normal  Sensation: Impaired  Range Of Motion:  AROM: Generally decreased, functional  PROM: Generally decreased, functional  Functional Mobility:  Transfers:  Sit to Stand: Supervision  Stand to Sit: Supervision  Balance:   Sitting: Intact  Standing: Intact; With support  Ambulation/Gait Training:  Distance (ft): 100 Feet (ft)  Assistive Device: Gait belt;Walker, rolling  Ambulation - Level of Assistance: Contact guard assistance  Gait Description (WDL): Exceptions to WDL  Gait Abnormalities: Decreased step clearance; Antalgic  Base of Support: Shift to left  Stance: Right decreased  Speed/Xochilt: Slow  Step Length: Right shortened;Left shortened  Pain:  Pain Scale 1: Visual  Pain Intensity 1: 0  Pain Location 1: Head  Pain Orientation 1: Posterior  Pain Description 1: Aching  Pain Intervention(s) 1: Medication (see MAR)  Activity Tolerance:   Fair  Please refer to the flowsheet for vital signs taken during this treatment. After treatment:   []         Patient left in no apparent distress sitting up in chair  [x]         Patient left in no apparent distress in bed  [x]         Call bell left within reach  [x]         Nursing notified  []         Caregiver present  []         Bed alarm activated    COMMUNICATION/EDUCATION:   [x]         Fall prevention education was provided and the patient/caregiver indicated understanding. [x]         Patient/family have participated as able in goal setting and plan of care. [x]         Patient/family agree to work toward stated goals and plan of care. []         Patient understands intent and goals of therapy, but is neutral about his/her participation. []         Patient is unable to participate in goal setting and plan of care.     Thank you for this referral.  Debbie Petit   Time Calculation: 20 mins   Eval Complexity: History: MEDIUM  Complexity : 1-2 comorbidities / personal factors will impact the outcome/ POC Exam:MEDIUM Complexity : 3 Standardized tests and measures addressing body structure, function, activity limitation and / or participation in recreation  Presentation: LOW Complexity : Stable, uncomplicated  Clinical Decision Making:Low Complexity amb >30ft c/RW, CGA for safety Overall Complexity:LOW  Mobility  Current  CJ= 20-39%   Goal  CI= 1-19%. The severity rating is based on the Level of Assistance required for Functional Mobility and ADLs.

## 2018-07-28 NOTE — PROGRESS NOTES
1900: Received report from [facility]. White board updated. Pt is alert but is unable to verbalize orientation. Pen and paper given to pt to better communicate with nurse and other medical staff. Pt currently reports posterior HA, 8/10, PRN Tylenol and ice given. Last NIH score 24, pt's 4 limbs not resisting against gravity, all scored 3. Pt's and family's questions and concerns addressed at this time. Will continue to monitor. 2000: NIH reassessed, pt needing encouraging, new score 11, BUE- no drift, BLE- slight drift to gravity. Pt also encouraged to speak by  at bedside, pt using one worded answers for nurse, speech is clear, and pt answering questions correctly, pt oriented x4.    2200: Pt still appearing to struggle with speech at times with providing nurse, but speaks in more complex sentences with clear speech to other medical staff members. 0000: Reassessment completed, pt seeming to improve in condition, vitals remain stable. Bed in lowest position, and call bell within reach. Will continue to monitor. 7825: PRN Tylenol given for HA.    0556: Pt c/o of choking sensation, pt sat up in bed, O2 97%, no wheezing, no coughing, no tearing of the eyes, pt able to speak using simple sentences, no obstruction seen by nurse, meds reviewed by nurse for allergic reactions, pt states that she takes Tylenol at home for pain, no reaction, pt not given any new medications. Dr. Lisa Albrecht notified, orders to monitor closely for any changes.

## 2018-07-28 NOTE — ROUTINE PROCESS
0700: Bedside and Verbal shift change report given to Lynne Hammond RN  (oncoming nurse) by Casi Zambrano RN   (offgoing nurse). Report included the following information SBAR, Kardex, Intake/Output, MAR, Accordion, Recent Results, Med Rec Status and Cardiac Rhythm NSR .

## 2018-07-28 NOTE — CONSULTS
NEUROLOGY CONSULTATION NOTE    Patient: Christy Rea MRN: [id number]  CSN: [id number]    YOB: 1945  Age: 71 y.o. Sex: female    DOA: 7/27/2018 LOS:  LOS: 1 day        Requesting Physician: Gracie Lang PA-C  Reason for Consultation: TIA     HISTORY OF PRESENT ILLNESS:   Christy Rea is a 71 y.o. female with history of CVA, HTN, HLD, DM, COPD and DVTs (on Coumadin), who presented with acute onset of right sided weakness and speech difficulty. She was at home when she called her  stating that she was bumping into objects and had slurred speech. He brought her to the ER. On the way to ER, she stopped talking and became aphasic. She was having headaches and neck pain, mostly on the left side. The headache is throbbing in nature but no photo or phonophobia. Her head CT did not show anything acute. She was seen by teleneurology and was offered tPA, but she declined. Her INR was 1.3. Her coumadin dosing was recently re-arranged by Dr. Bill Garrison, her PCP. She does have memory problems. She also endorses swallowing difficulties. MRI was performed. I can not see any acute stroke, but official read is pending. She is on aspirin 81mg at home. A loading dose was given on admission. Currently she is able to speak. She denies lateralized weakness or numbness. Stroke Work-up:  Brain MRI: result pending. No infarct that I can see. Head CT: 1. No evidence of acute infarct, hemorrhage or mass. 2. Stable extensive bilateral white matter hypodensity consistent with microvascular ischemic disease. CTA of head and neck: requested today. pending. Echocardiogram: pending.   Lipid panel:   Lab Results   Component Value Date/Time    Cholesterol, total 127 07/28/2018 06:06 AM    HDL Cholesterol 35 (L) 07/28/2018 06:06 AM    LDL, calculated 61.6 07/28/2018 06:06 AM    VLDL, calculated 30.4 07/28/2018 06:06 AM    Triglyceride 152 (H) 07/28/2018 06:06 AM    CHOL/HDL Ratio 3.6 07/28/2018 06:06 AM     HbA1c: Lab Results   Component Value Date/Time    Hemoglobin A1c 8.2 (H) 07/28/2018 06:06 AM     PAST MEDICAL HISTORY:  Past Medical History:   Diagnosis Date    Asthma     [facility]     Diabetes ([facility]     HTN (hypertension), benign     Hyperlipidemia     Menopause     Myocardial infarction (HCC)     Neuropathy     Sciatica     Stroke ([facility]      PAST SURGICAL HISTORY:  Past Surgical History:   Procedure Laterality Date    FOOT/TOES SURGERY PROC UNLISTED      HX CARPAL TUNNEL RELEASE      HX HEMORRHOIDECTOMY      HX HYSTERECTOMY      Age 40    HX KNEE REPLACEMENT       FAMILY HISTORY:  Family History   Problem Relation Age of Onset    Breast Cancer Mother     Breast Cancer Sister      SOCIAL HISTORY:  Social History     Social History    Marital status:      Spouse name: N/A    Number of children: N/A    Years of education: N/A     Social History Main Topics    Smoking status: Former Smoker     Packs/day: 0.30     Years: 5.00     Quit date: 3/17/2005    Smokeless tobacco: Never Used    Alcohol use No    Drug use: No    Sexual activity: Not Asked     Other Topics Concern    None     Social History Narrative     MEDICATIONS:  Current Facility-Administered Medications   Medication Dose Route Frequency    cefTRIAXone (ROCEPHIN) 1 g in sterile water (preservative free) 10 mL IV syringe  1 g IntraVENous Q24H    insulin glargine (LANTUS) injection 15 Units  15 Units SubCUTAneous QHS    warfarin (COUMADIN) tablet 7.5 mg  7.5 mg Oral ONCE    Warfarin - Pharmacy to Dose  1 Each Other Rx Dosing/Monitoring    HYDROcodone-acetaminophen (NORCO) 5-325 mg per tablet 1 Tab  1 Tab Oral Q6H PRN    aspirin chewable tablet 81 mg  81 mg Oral DAILY    umeclidinium (INCRUSE ELLIPTA) 62.5 mcg/actuation  1 Puff Inhalation DAILY    sodium chloride (NS) flush 5-10 mL  5-10 mL IntraVENous Q8H    sodium chloride (NS) flush 5-10 mL  5-10 mL IntraVENous PRN    insulin lispro (HUMALOG) injection   SubCUTAneous AC&HS    glucose chewable tablet 16 g  4 Tab Oral PRN    glucagon (GLUCAGEN) injection 1 mg  1 mg IntraMUSCular PRN    dextrose (D50W) injection syrg 12.5-25 g  25-50 mL IntraVENous PRN    ondansetron (ZOFRAN) injection 4 mg  4 mg IntraVENous Q6H PRN    labetalol (NORMODYNE;TRANDATE) injection 5 mg  5 mg IntraVENous Q10MIN PRN    famotidine (PEPCID) tablet 20 mg  20 mg Oral BID    0.9% sodium chloride infusion  100 mL/hr IntraVENous CONTINUOUS    atorvastatin (LIPITOR) tablet 40 mg  40 mg Oral QHS    enoxaparin (LOVENOX) injection 40 mg  40 mg SubCUTAneous Q24H    acetaminophen (TYLENOL) tablet 650 mg  650 mg Oral Q6H PRN     Prior to Admission medications    Medication Sig Start Date End Date Taking? Authorizing Provider   diphenhydrAMINE (BENADRYL) 25 mg capsule Take 1 Cap by mouth every six (6) hours as needed for up to 10 days. 7/23/18 8/2/18  LUZ MARIA Stock   famotidine (PEPCID) 20 mg tablet Take 1 Tab by mouth two (2) times a day for 10 days. 7/23/18 8/2/18  LUZ MARIA Stock   warfarin (COUMADIN) 5 mg tablet Take 5 mg by mouth five (5) days a week. TAKE COUMADIN 7.5 MG X 2 DAYS THEN, TAKE COUMADIN 5 MG DAILY NEXT PROTIME WILL BE ON 12/7/17 AT DR. Polina Sanderson OFFICE  Indications: DEEP VEIN THROMBOSIS PREVENTION    Historical Provider   oxyCODONE-acetaminophen (PERCOCET) 5-325 mg per tablet Take 1 Tab by mouth every four (4) hours as needed for Pain. Max Daily Amount: 6 Tabs. Do NOT take with norco 8/30/17   Darryn Hagan PA-C   lidocaine (LIDODERM) 5 % Apply patch to the affected area for 12 hours a day and remove for 12 hours a day. 8/30/17   Aubrey Gomez PA-C   naproxen sodium (ALEVE) 220 mg tablet Take 2 Tabs by mouth two (2) times daily (with meals). 5/21/17   Lucero Eldridge MD   atenolol (TENORMIN) 100 mg tablet Take 200 mg by mouth daily. Historical Provider   gabapentin (NEURONTIN) 300 mg capsule Take 300 mg by mouth daily.  300mg am and 600mg pm    Historical Provider   gabapentin (NEURONTIN) 300 mg capsule Take 600 mg by mouth every evening. Historical Provider   losartan-hydroCHLOROthiazide (HYZAAR) 100-12.5 mg per tablet Take 1 Tab by mouth daily. Historical Provider   simvastatin (ZOCOR) 20 mg tablet Take 20 mg by mouth nightly. Historical Provider   nortriptyline (PAMELOR) 25 mg capsule Take 100 mg by mouth nightly. Historical Provider   mometasone-formoterol (DULERA) 200-5 mcg/actuation HFA inhaler Take 2 Puffs by inhalation two (2) times a day. Historical Provider   insulin glargine (LANTUS) 100 unit/mL injection 30 Units by SubCUTAneous route daily. Historical Provider   montelukast (SINGULAIR) 10 mg tablet Take 10 mg by mouth every evening. Historical Provider   cyanocobalamin 1,000 mcg tablet Take 2,000 mcg by mouth daily. Historical Provider   glimepiride (AMARYL) 4 mg tablet Take 4 mg by mouth every morning. Historical Provider   aspirin 81 mg chewable tablet Take 1 Tab by mouth daily. 12/9/15   Elli Holloway MD   tiotropium bromide (SPIRIVA RESPIMAT) 2.5 mcg/actuation mist Take 1 Puff by inhalation two (2) times a day. Krishna Heredia MD   metformin (GLUCOPHAGE) 850 mg tablet Take 850 mg by mouth three (3) times daily. 3/17/11   Historical Provider       ALLERGIES:  Allergies   Allergen Reactions    Dilaudid [Hydromorphone] Other (comments)     Hallucinations      Ibuprofen Swelling     mouth       Review of Systems  GENERAL: No fevers or chills. HEENT: No change in vision, earache, tinnitus, sore throat or sinus congestion. NECK: No pain or stiffness. CARDIOVASCULAR: No chest pain or pressure. No palpitations. PULMONARY: No shortness of breath, cough or wheeze. GASTROINTESTINAL: No abdominal pain, nausea, vomiting or diarrhea. GENITOURINARY: No urinary frequency, urgency, hesitancy or dysuria. MUSCULOSKELETAL: No joint or muscle pain, no back pain, no recent trauma.    DERMATOLOGIC: No rash, no itching, no lesions. ENDOCRINE: No polyuria, polydipsia, no heat or cold intolerance. No recent change in weight. HEMATOLOGICAL: No anemia or easy bruising or bleeding. NEUROLOGIC: Positive for headache and right sided weakness. No numbness. PHYSICAL EXAMINATION:     Visit Vitals    /79 (BP 1 Location: Right arm, BP Patient Position: At rest;Supine; Head of bed elevated (Comment degrees))    Pulse 60    Temp 98.6 °F (37 °C)    Resp 18    Ht 5' 4\" (1.626 m)    SpO2 97%      O2 Device: Room air  GENERAL: Pleasant, in no apparent distress. HEENT: Moist mucous membranes, sclerae anicteric, scalp is atraumatic. CVS: Regular rate and rhythm, no murmurs or gallops. No carotid bruits. PULMONARY: Clear to auscultation bilaterally. No rales or rhonchi. No wheezing. EXTREMITIES: Normal range of motion at all sites. No deformities. ABDOMEN: Soft, nontender. SKIN: No rashes or ecchymoses. Warm and dry. NEUROLOGIC: Alert and oriented to [facility], self, other, July but misses the day by 3 days. [facility] Speech is fluent without any aphasia or dysarthria. Cranial nerves: Face is symmetric with symmetric smile. Facial sensation is intact. Extraocular movements are intact with no nystagmus. Visual fields are full to confrontation. PERRL. Tongue is midline. Palate elevates symmetrically. Hearing intact to speech. Sternocleidomastoid and trapezius strengths are full bilaterally. Motor: Normal tone and normal bulk on all four extremities. Strength is full on all four segmentally. There is no pronator drift or orbiting. Sensory: Left arm and face and right leg decreased to touch. Coordination: Intact coordination with finger-nose-finger bilaterally. Normal fine movements. No bradykinesia detected. Deep tendon reflexes: Decreased DTRs at all sites.      Labs: Results:       Chemistry Recent Labs      07/28/18   0606  07/27/18   1335   GLU  96  105*   NA  139  139   K  3.5  3.9   CL  104  101   CO2  29  27 BUN  9  12   CREA  0.80  1.07   CA  8.6  8.9   AGAP  6  11   BUCR  11*  11*   AP   --   85   TP   --   7.9   ALB   --   3.5   GLOB   --   4.4*   AGRAT   --   0.8      CBC w/Diff Recent Labs      07/28/18   0606  07/27/18   1335   WBC  4.1*  5.1   RBC  3.65*  4.03*   HGB  10.2*  11.2*   HCT  31.8*  35.1   PLT  257  284   GRANS   --   48   LYMPH   --   45   EOS   --   1      Cardiac Enzymes Recent Labs      07/27/18   1335   CPK  76   CKND1  2.0      Coagulation Recent Labs      07/27/18   1335   PTP  15.2   INR  1.3*   APTT  36.8*       Lipid Panel Lab Results   Component Value Date/Time    Cholesterol, total 127 07/28/2018 06:06 AM    HDL Cholesterol 35 (L) 07/28/2018 06:06 AM    LDL, calculated 61.6 07/28/2018 06:06 AM    VLDL, calculated 30.4 07/28/2018 06:06 AM    Triglyceride 152 (H) 07/28/2018 06:06 AM    CHOL/HDL Ratio 3.6 07/28/2018 06:06 AM      BNP No results for input(s): BNPP in the last 72 hours. Liver Enzymes Recent Labs      07/27/18   1335   TP  7.9   ALB  3.5   AP  85   SGOT  22      Thyroid Studies Lab Results   Component Value Date/Time    TSH 1.40 07/27/2018 01:35 PM          Radiology:  Xr Chest Pa Lat    Result Date: 7/23/2018  EXAM: Chest 2 views INDICATION: Chest pressure COMPARISON: Single view chest 11/21/2017 _______________ FINDINGS: PA and lateral chest films were performed. Lungs are mildly hyperinflated. No focal infiltrate. Calcified granuloma right lower lobe is unchanged. No effusion or pneumothorax. Heart and pulmonary vascularity are normal. _______________     IMPRESSION: 1. No acute cardiopulmonary disease. Ct Head Wo Cont    Result Date: 7/27/2018  EXAM: CT head INDICATION: Right-sided weakness. Difficulty speaking.  COMPARISON: CT head July 23, 2018 TECHNIQUE: Axial CT imaging of the head was performed without intravenous contrast. One or more dose reduction techniques were used on this CT: automated exposure control, adjustment of the mAs and/or kVp according to patient's size, and iterative reconstruction techniques. The specific techniques utilized on this CT exam have been documented in the patient's electronic medical record. _______________ FINDINGS: BRAIN AND POSTERIOR FOSSA: Cortical sulci volume remains within normal limits for patient age. Ventricular size and configuration is stable. Basilar cisterns remain patent. Physiologic bilateral basal ganglia calcifications redemonstrated, unchanged. Moderate subcortical and periventricular white matter hypoattenuation similar to prior examination. There is no intracranial hemorrhage, mass effect, or midline shift. The gray-white matter differentiation is within normal limits. EXTRA-AXIAL SPACES AND MENINGES: There are no abnormal extra-axial fluid collections. CALVARIUM: Intact. SINUSES: Imaged paranasal sinuses and mastoid air cells are clear. OTHER: Atherosclerotic vascular calcifications of the carotid siphons are present bilaterally. _______________     IMPRESSION: 1. No acute intracranial abnormality. Stable examination. 2. Moderate subcortical and periventricular white matter hypoattenuation; unchanged and nonspecific, likely reflecting sequela of chronic ischemic microvascular change. Critical result: CODE S exam findings conveyed to physician's assistant Logan Peña in the ED prior to dictation at 1:45 PM on 7/27/2018. Ct Head Wo Cont    Result Date: 7/23/2018  EXAM: CT head INDICATION: Posterior headache for one week COMPARISON: Noncontrast CT brain 12/7/2015] TECHNIQUE: Axial CT imaging of the head was performed without intravenous contrast. Coronal and sagittal reconstructions were performed. One or more dose reduction techniques were used on this CT: automated exposure control, adjustment of the mAs and/or kVp according to patient's size, and iterative reconstruction techniques. The specific techniques utilized on this CT exam have been documented in the patient's electronic medical record.  . _______________ FINDINGS: BRAIN AND POSTERIOR FOSSA: Ventricles are normal in size and surface sulci. There is extensive white matter hypoattenuation which is unchanged from prior exam. There is no intracranial hemorrhage, mass effect, or midline shift. No evidence of acute cortical infarct. EXTRA-AXIAL SPACES AND MENINGES: There are no abnormal extra-axial fluid collections. CALVARIUM: Intact. SINUSES: Clear. OTHER: Visualized orbital structures and mastoid air cells are unremarkable. _______________     IMPRESSION: 1. No evidence of acute infarct, hemorrhage or mass. 2. Stable extensive bilateral white matter hypodensity consistent with microvascular ischemic disease. Xr Chest Port    Result Date: 7/27/2018  Chest, single view Indication: Right-sided weakness with difficulty speaking. Possible stroke. Comparison: Several prior exams, most recently July 23, 2018 Findings:  Portable upright AP view of the chest was obtained. The cardiomediastinal silhouette is within normal limits. The pulmonary vasculature is unremarkable. Lung parenchyma is well aerated, without focal consolidation. Calcified granuloma right lung base, unchanged. No pleural effusion nor pneumothorax. No acute osseous abnormality. Impression: No radiographic evidence of an acute abnormality. ASSESSMENT/IMPRESSION:  Possibly TIA or transient alteration of awareness in the setting of possible UTI. RECOMMENDATIONS:  1. Continue aspirin 81mg and Atorvastatin 40mg daily  2. Treatment of UTI  3. Butalbital combination tabs for headache  4. IV or oral liquids. 5. Continue tele monitoring  6. Continue Coumadin with goal INR 2 to 3.  7. Neuro checks per floor protocol  8. CTA head and neck and echo      I will follow the patient.  Please do not hesitate to return with any questions.    ------------------------------------  Brandon Tom MD  7/28/2018  11:18 AM

## 2018-07-28 NOTE — PROGRESS NOTES
Pharmacy Dosing Services: Warfarin    Consult for Warfarin Dosing by Pharmacy by Dr. Villa Comment provided for this 71 y.o.  female , for indication of throboembolism (cerebral prevention) / history of stroke. Pt on warfarin as outpatient. Dose recently increased to Warfarin 7 mg po daily (prior dose Warfarin 6 mg po daily)  Dose to achieve an INR goal of 2-3    Order entered for  Warfarin  7.5 mg to be given today at 18:00. Pt also has order for Enoxaparin 40 mg SQ q24h    Significant drug interactions: aspirin 81 mg  LABS    PT/INR Lab Results   Component Value Date/Time    INR 1.3 (H) 07/27/2018 01:35 PM      Platelets Lab Results   Component Value Date/Time    PLATELET 998 89/25/6626 06:06 AM      H/H     Lab Results   Component Value Date/Time    HGB 10.2 (L) 07/28/2018 06:06 AM        Warfarin Administrations (last 168 hours)       None          Pharmacy to follow daily and will provide subsequent Warfarin dosing based on clinical status.   HANNAH Calloway  Contact information [phone]

## 2018-07-28 NOTE — PROGRESS NOTES
0800:  Assumed care for patient, received bedside report from [facility], RN. Patient lying in bed, quietly resting with no complaints of pain or discomfort at the time. Patient has slight right sided weakness, no slurred speech, no facial droop, tongue midline. NIH performed. Whiteboard updated, bed at the lowest position with call bell within reach. 1200:  Patient off floor for CT.    1311:  Patient back on unit from CT.    1940:  Paged on call in regard to patient SBP in 170's-180's. Patient only has labetalol PRN with parameters of 220/110.    1945:  Spoke with Dr. Paula Victoria and given telephone order for Hydralazine 10mg IV if SBP >180 every 3 hours PRN and to drop off at 0700. Bedside and Verbal shift change report given to Neeta Buckner RN (oncoming nurse) by Shira Lnada RN   (offgoing nurse). Report included the following information SBAR, Kardex, ED Summary, Procedure Summary, Intake/Output, MAR, Med Rec Status, Cardiac Rhythm SR/SA and Alarm Parameters .

## 2018-07-29 ENCOUNTER — APPOINTMENT (OUTPATIENT)
Dept: NON INVASIVE DIAGNOSTICS | Age: 73
DRG: 069 | End: 2018-07-29
Attending: PHYSICIAN ASSISTANT
Payer: MEDICARE

## 2018-07-29 LAB
ANION GAP SERPL CALC-SCNC: 8 MMOL/L (ref 3–18)
ATRIAL RATE: 61 BPM
ATRIAL RATE: 70 BPM
BUN SERPL-MCNC: 5 MG/DL (ref 7–18)
BUN/CREAT SERPL: 7 (ref 12–20)
CALCIUM SERPL-MCNC: 8.9 MG/DL (ref 8.5–10.1)
CALCULATED P AXIS, ECG09: 75 DEGREES
CALCULATED R AXIS, ECG10: 44 DEGREES
CALCULATED R AXIS, ECG10: 67 DEGREES
CALCULATED T AXIS, ECG11: 63 DEGREES
CALCULATED T AXIS, ECG11: 89 DEGREES
CHLORIDE SERPL-SCNC: 106 MMOL/L (ref 100–108)
CO2 SERPL-SCNC: 28 MMOL/L (ref 21–32)
CREAT SERPL-MCNC: 0.76 MG/DL (ref 0.6–1.3)
DIAGNOSIS, 93000: NORMAL
DIAGNOSIS, 93000: NORMAL
ECHO AO ARCH DIAM: 2.85 CM
ECHO AO ASC DIAM: 3.26 CM
ECHO AO ROOT DIAM: 3.06 CM
ECHO AV AREA PEAK VELOCITY: 3.4 CM2
ECHO AV AREA VTI: 3.2 CM2
ECHO AV AREA/BSA PEAK VELOCITY: 1.7 CM2/M2
ECHO AV AREA/BSA VTI: 1.6 CM2/M2
ECHO AV CUSP MM: 2.03 CM
ECHO AV MEAN GRADIENT: 4.9 MMHG
ECHO AV PEAK GRADIENT: 7.4 MMHG
ECHO AV PEAK VELOCITY: 135.7 CM/S
ECHO AV VTI: 28.37 CM
ECHO LA MAJOR AXIS: 3.67 CM
ECHO LA VOL 2C: 60.32 ML (ref 22–52)
ECHO LA VOL 4C: 51.42 ML (ref 22–52)
ECHO LA VOL BP: 61.04 ML (ref 22–52)
ECHO LA VOL/BSA BIPLANE: 31.21 ML/M2
ECHO LA VOLUME INDEX A2C: 30.84 ML/M2
ECHO LA VOLUME INDEX A4C: 26.29 ML/M2
ECHO LV E' LATERAL VELOCITY: 0.08 CM/S
ECHO LV E' SEPTAL VELOCITY: 0.05 CM/S
ECHO LV EDV A2C: 80.8 ML
ECHO LV EDV A4C: 83.1 ML
ECHO LV EDV BP: 82.3 ML (ref 56–104)
ECHO LV EDV INDEX A4C: 42.5 ML/M2
ECHO LV EDV INDEX BP: 42.1 ML/M2
ECHO LV EDV NDEX A2C: 41.3 ML/M2
ECHO LV EJECTION FRACTION A2C: 77 %
ECHO LV EJECTION FRACTION A4C: 78 %
ECHO LV EJECTION FRACTION BIPLANE: 77.5 % (ref 55–100)
ECHO LV ESV A2C: 18.2 ML
ECHO LV ESV A4C: 18.4 ML
ECHO LV ESV BP: 18.5 ML (ref 19–49)
ECHO LV ESV INDEX A2C: 9.3 ML/M2
ECHO LV ESV INDEX A4C: 9.4 ML/M2
ECHO LV ESV INDEX BP: 9.5 ML/M2
ECHO LV INTERNAL DIMENSION DIASTOLIC: 3.79 CM (ref 3.9–5.3)
ECHO LV INTERNAL DIMENSION SYSTOLIC: 2.48 CM
ECHO LV IVSD: 1.39 CM (ref 0.6–0.9)
ECHO LV MASS 2D: 230.8 G (ref 67–162)
ECHO LV MASS INDEX 2D: 118 G/M2
ECHO LV POSTERIOR WALL DIASTOLIC: 1.43 CM (ref 0.6–0.9)
ECHO LVOT DIAM: 2.03 CM
ECHO LVOT PEAK GRADIENT: 8 MMHG
ECHO LVOT PEAK VELOCITY: 141.81 CM/S
ECHO LVOT VTI: 28.4 CM
ECHO MV A VELOCITY: 106.33 CM/S
ECHO MV AREA PHT: 4.1 CM2
ECHO MV E DECELERATION TIME (DT): 183.6 MS
ECHO MV E VELOCITY: 1.17 CM/S
ECHO MV E/A RATIO: 0.01
ECHO MV E/E' LATERAL: 14.63
ECHO MV E/E' RATIO (AVERAGED): 19.01
ECHO MV E/E' SEPTAL: 23.4
ECHO MV PRESSURE HALF TIME (PHT): 53.2 MS
ECHO RA AREA 4C: 13.7 CM2
ECHO RV INTERNAL DIMENSION: 3.8 CM
ECHO RV TAPSE: 3.4 CM (ref 1.5–2)
ERYTHROCYTE [DISTWIDTH] IN BLOOD BY AUTOMATED COUNT: 16.2 % (ref 11.6–14.5)
GLUCOSE BLD STRIP.AUTO-MCNC: 129 MG/DL (ref 70–110)
GLUCOSE BLD STRIP.AUTO-MCNC: 133 MG/DL (ref 70–110)
GLUCOSE BLD STRIP.AUTO-MCNC: 152 MG/DL (ref 70–110)
GLUCOSE BLD STRIP.AUTO-MCNC: 165 MG/DL (ref 70–110)
GLUCOSE SERPL-MCNC: 170 MG/DL (ref 74–99)
HCT VFR BLD AUTO: 32.2 % (ref 35–45)
HGB BLD-MCNC: 10.5 G/DL (ref 12–16)
INR PPP: 1.4 (ref 0.8–1.2)
MCH RBC QN AUTO: 28.3 PG (ref 24–34)
MCHC RBC AUTO-ENTMCNC: 32.6 G/DL (ref 31–37)
MCV RBC AUTO: 86.8 FL (ref 74–97)
P-R INTERVAL, ECG05: 174 MS
P-R INTERVAL, ECG05: 182 MS
PLATELET # BLD AUTO: 272 K/UL (ref 135–420)
PMV BLD AUTO: 10.2 FL (ref 9.2–11.8)
POTASSIUM SERPL-SCNC: 3.5 MMOL/L (ref 3.5–5.5)
PROTHROMBIN TIME: 16.9 SEC (ref 11.5–15.2)
Q-T INTERVAL, ECG07: 420 MS
Q-T INTERVAL, ECG07: 440 MS
QRS DURATION, ECG06: 72 MS
QRS DURATION, ECG06: 86 MS
QTC CALCULATION (BEZET), ECG08: 442 MS
QTC CALCULATION (BEZET), ECG08: 453 MS
RBC # BLD AUTO: 3.71 M/UL (ref 4.2–5.3)
SODIUM SERPL-SCNC: 142 MMOL/L (ref 136–145)
VENTRICULAR RATE, ECG03: 61 BPM
VENTRICULAR RATE, ECG03: 70 BPM
WBC # BLD AUTO: 5.9 K/UL (ref 4.6–13.2)

## 2018-07-29 PROCEDURE — 74011250636 HC RX REV CODE- 250/636: Performed by: PHYSICIAN ASSISTANT

## 2018-07-29 PROCEDURE — 74011636637 HC RX REV CODE- 636/637: Performed by: PHYSICIAN ASSISTANT

## 2018-07-29 PROCEDURE — 65660000000 HC RM CCU STEPDOWN

## 2018-07-29 PROCEDURE — 74011000250 HC RX REV CODE- 250

## 2018-07-29 PROCEDURE — 74011000250 HC RX REV CODE- 250: Performed by: HOSPITALIST

## 2018-07-29 PROCEDURE — 74011250637 HC RX REV CODE- 250/637: Performed by: PSYCHIATRY & NEUROLOGY

## 2018-07-29 PROCEDURE — 97530 THERAPEUTIC ACTIVITIES: CPT

## 2018-07-29 PROCEDURE — 85027 COMPLETE CBC AUTOMATED: CPT | Performed by: PHYSICIAN ASSISTANT

## 2018-07-29 PROCEDURE — 74011250636 HC RX REV CODE- 250/636: Performed by: PSYCHIATRY & NEUROLOGY

## 2018-07-29 PROCEDURE — 74011250636 HC RX REV CODE- 250/636: Performed by: HOSPITALIST

## 2018-07-29 PROCEDURE — 74011250637 HC RX REV CODE- 250/637: Performed by: PHYSICIAN ASSISTANT

## 2018-07-29 PROCEDURE — 80048 BASIC METABOLIC PNL TOTAL CA: CPT | Performed by: PHYSICIAN ASSISTANT

## 2018-07-29 PROCEDURE — 85610 PROTHROMBIN TIME: CPT | Performed by: PHYSICIAN ASSISTANT

## 2018-07-29 PROCEDURE — 97116 GAIT TRAINING THERAPY: CPT

## 2018-07-29 PROCEDURE — 74011250637 HC RX REV CODE- 250/637: Performed by: INTERNAL MEDICINE

## 2018-07-29 PROCEDURE — 74011250637 HC RX REV CODE- 250/637: Performed by: HOSPITALIST

## 2018-07-29 PROCEDURE — 74011000250 HC RX REV CODE- 250: Performed by: PSYCHIATRY & NEUROLOGY

## 2018-07-29 PROCEDURE — 96374 THER/PROPH/DIAG INJ IV PUSH: CPT

## 2018-07-29 PROCEDURE — 74011636637 HC RX REV CODE- 636/637: Performed by: HOSPITALIST

## 2018-07-29 PROCEDURE — 77010033678 HC OXYGEN DAILY

## 2018-07-29 PROCEDURE — 74011000258 HC RX REV CODE- 258: Performed by: PSYCHIATRY & NEUROLOGY

## 2018-07-29 PROCEDURE — 36415 COLL VENOUS BLD VENIPUNCTURE: CPT | Performed by: PHYSICIAN ASSISTANT

## 2018-07-29 PROCEDURE — 82962 GLUCOSE BLOOD TEST: CPT

## 2018-07-29 RX ORDER — ATENOLOL 50 MG/1
200 TABLET ORAL DAILY
Status: DISCONTINUED | OUTPATIENT
Start: 2018-07-29 | End: 2018-07-30 | Stop reason: HOSPADM

## 2018-07-29 RX ORDER — GABAPENTIN 300 MG/1
600 CAPSULE ORAL EVERY EVENING
Status: DISCONTINUED | OUTPATIENT
Start: 2018-07-29 | End: 2018-07-30 | Stop reason: HOSPADM

## 2018-07-29 RX ORDER — SODIUM CHLORIDE 9 MG/ML
INJECTION INTRAMUSCULAR; INTRAVENOUS; SUBCUTANEOUS
Status: COMPLETED
Start: 2018-07-29 | End: 2018-07-29

## 2018-07-29 RX ORDER — GABAPENTIN 300 MG/1
300 CAPSULE ORAL DAILY
Status: DISCONTINUED | OUTPATIENT
Start: 2018-07-30 | End: 2018-07-30 | Stop reason: HOSPADM

## 2018-07-29 RX ORDER — WARFARIN 7.5 MG/1
7.5 TABLET ORAL ONCE
Status: COMPLETED | OUTPATIENT
Start: 2018-07-29 | End: 2018-07-29

## 2018-07-29 RX ORDER — BUTALBITAL, ACETAMINOPHEN AND CAFFEINE 50; 325; 40 MG/1; MG/1; MG/1
1 TABLET ORAL
Status: DISCONTINUED | OUTPATIENT
Start: 2018-07-29 | End: 2018-07-30 | Stop reason: HOSPADM

## 2018-07-29 RX ORDER — NORTRIPTYLINE HYDROCHLORIDE 25 MG/1
100 CAPSULE ORAL
Status: DISCONTINUED | OUTPATIENT
Start: 2018-07-29 | End: 2018-07-30 | Stop reason: HOSPADM

## 2018-07-29 RX ORDER — HYDROCODONE BITARTRATE AND ACETAMINOPHEN 5; 325 MG/1; MG/1
1 TABLET ORAL ONCE
Status: COMPLETED | OUTPATIENT
Start: 2018-07-29 | End: 2018-07-29

## 2018-07-29 RX ORDER — SODIUM CHLORIDE 9 MG/ML
15 INJECTION INTRAMUSCULAR; INTRAVENOUS; SUBCUTANEOUS ONCE
Status: COMPLETED | OUTPATIENT
Start: 2018-07-29 | End: 2018-07-29

## 2018-07-29 RX ADMIN — Medication 10 ML: at 23:11

## 2018-07-29 RX ADMIN — GABAPENTIN 600 MG: 300 CAPSULE ORAL at 17:56

## 2018-07-29 RX ADMIN — HYDROCHLOROTHIAZIDE: 12.5 CAPSULE ORAL at 08:41

## 2018-07-29 RX ADMIN — SODIUM CHLORIDE 15 ML: 9 INJECTION, SOLUTION INTRAMUSCULAR; INTRAVENOUS; SUBCUTANEOUS at 09:55

## 2018-07-29 RX ADMIN — ASPIRIN 81 MG 81 MG: 81 TABLET ORAL at 08:41

## 2018-07-29 RX ADMIN — FAMOTIDINE 20 MG: 20 TABLET ORAL at 08:41

## 2018-07-29 RX ADMIN — HYDROCODONE BITARTRATE AND ACETAMINOPHEN 1 TABLET: 5; 325 TABLET ORAL at 08:41

## 2018-07-29 RX ADMIN — NORTRIPTYLINE HYDROCHLORIDE 25 MG: 25 CAPSULE ORAL at 23:09

## 2018-07-29 RX ADMIN — ONDANSETRON 4 MG: 2 INJECTION INTRAMUSCULAR; INTRAVENOUS at 08:45

## 2018-07-29 RX ADMIN — ATORVASTATIN CALCIUM 40 MG: 20 TABLET, FILM COATED ORAL at 22:57

## 2018-07-29 RX ADMIN — ACETAMINOPHEN 650 MG: 325 TABLET, FILM COATED ORAL at 02:46

## 2018-07-29 RX ADMIN — VALPROATE SODIUM 750 MG: 100 INJECTION, SOLUTION INTRAVENOUS at 13:19

## 2018-07-29 RX ADMIN — DIVALPROEX SODIUM EXTENDED-RELEASE 750 MG: 250 TABLET, EXTENDED RELEASE ORAL at 23:09

## 2018-07-29 RX ADMIN — ENOXAPARIN SODIUM 40 MG: 40 INJECTION, SOLUTION INTRAVENOUS; SUBCUTANEOUS at 17:56

## 2018-07-29 RX ADMIN — NORTRIPTYLINE HYDROCHLORIDE 100 MG: 25 CAPSULE ORAL at 22:57

## 2018-07-29 RX ADMIN — CEFTRIAXONE 1 G: 1 INJECTION, POWDER, FOR SOLUTION INTRAMUSCULAR; INTRAVENOUS at 08:40

## 2018-07-29 RX ADMIN — HYDROCODONE BITARTRATE AND ACETAMINOPHEN 1 TABLET: 5; 325 TABLET ORAL at 10:53

## 2018-07-29 RX ADMIN — INSULIN LISPRO 2 UNITS: 100 INJECTION, SOLUTION INTRAVENOUS; SUBCUTANEOUS at 22:58

## 2018-07-29 RX ADMIN — UMECLIDINIUM 1 PUFF: 62.5 AEROSOL, POWDER ORAL at 08:55

## 2018-07-29 RX ADMIN — WARFARIN SODIUM 7.5 MG: 7.5 TABLET ORAL at 17:56

## 2018-07-29 RX ADMIN — SODIUM CHLORIDE: 9 INJECTION, SOLUTION INTRAMUSCULAR; INTRAVENOUS; SUBCUTANEOUS at 10:00

## 2018-07-29 RX ADMIN — PERFLUTREN 1 ML: 6.52 INJECTION, SUSPENSION INTRAVENOUS at 09:54

## 2018-07-29 RX ADMIN — ATENOLOL 200 MG: 50 TABLET ORAL at 13:19

## 2018-07-29 RX ADMIN — FAMOTIDINE 20 MG: 20 TABLET ORAL at 22:57

## 2018-07-29 RX ADMIN — PROMETHAZINE HYDROCHLORIDE 25 MG: 25 INJECTION, SOLUTION INTRAMUSCULAR; INTRAVENOUS at 13:20

## 2018-07-29 RX ADMIN — INSULIN LISPRO 2 UNITS: 100 INJECTION, SOLUTION INTRAVENOUS; SUBCUTANEOUS at 06:43

## 2018-07-29 RX ADMIN — INSULIN GLARGINE 15 UNITS: 100 INJECTION, SOLUTION SUBCUTANEOUS at 22:57

## 2018-07-29 RX ADMIN — Medication 10 ML: at 13:26

## 2018-07-29 RX ADMIN — BUTALBITAL, ACETAMINOPHEN AND CAFFEINE 1 TABLET: 50; 325; 40 TABLET ORAL at 22:57

## 2018-07-29 NOTE — PROGRESS NOTES
Chart reviewed. Met with pt and pts  at bedside. Offered FOC for [facility] and provided [facility] list.  Pt and  will review and get back with CM. CM will cont to follow.

## 2018-07-29 NOTE — PROGRESS NOTES
0800:  Assumed care for patient, received bedside report from Carlee Mullen RN. Patient sitting on side of bed with complaints of a headache. Informed patient will review chart for PRN medication. NIH screen performed, score is 0. Whiteboard updated, bed at the lowest position with call bell within reach. 0900:  Spoke with LUZ MARIA Fan in regard to patient expressing no relief from [employer] order put in.    1000:  Echo done at bedside. 1200:  Dr. Cornelia Curtis at bedside. Bedside and Verbal shift change report given to Tutu Cazares RN (oncoming nurse) by Enzo Amaya RN   (offgoing nurse). Report included the following information SBAR, Kardex, ED Summary, Procedure Summary, Intake/Output, MAR, Med Rec Status, Cardiac Rhythm SR and Alarm Parameters .

## 2018-07-29 NOTE — PROGRESS NOTES
Pharmacy Dosing Services: Warfarin    Consult for Warfarin Dosing by Pharmacy by Dr. Suhail Trejo provided for this 71 y.o.  female , for indication of throboembolism (cerebral prevention) / history of CVA / clots  Pt on warfarin as outpatient. Dose to achieve an INR goal of 2-3    Order entered for  Warfarin 7.5 mg to be given today at 18:00. Pt also has order for Enoxaparin 40 mg SQ q24h    Significant drug interactions: aspirin 81 mg  LABS    PT/INR Lab Results   Component Value Date/Time    INR 1.4 (H) 07/29/2018 03:32 AM      Platelets Lab Results   Component Value Date/Time    PLATELET 272 21/17/0110 03:32 AM      H/H     Lab Results   Component Value Date/Time    HGB 10.5 (L) 07/29/2018 03:32 AM        Warfarin Administrations (last 168 hours)       Date/Time Action Medication Dose      07/28/18 1829 Given    warfarin (COUMADIN) tablet 7.5 mg 7.5 mg          Pharmacy to follow daily and will provide subsequent Warfarin dosing based on clinical status.   HANNAH Walters  Contact information [phone]

## 2018-07-29 NOTE — PROGRESS NOTES
NEUROLOGY PROGRESS NOTE        Patient: Hannah Werner        Sex: female          DOA: 7/27/2018  YOB: 1945      Age:  71 y.o.         LOS: 2 days     Identification:  71 y.o. female with history of CVA, HTN, HLD, DM, COPD and DVTs (on Coumadin), who presented with acute onset of right sided weakness and speech difficulty. SUBJECTIVE:   Pt has severe headaches. She was started on some medications for headches as outpatient. She is already on gabapentin and nortriptyline for peripheral neuropathy    Stroke Work-up:  Brain MRI: 1. No acute hemorrhage or acute infarction. 2. White matter abnormality is nonspecific but likely ischemic. This is stable. Basal ganglia and thalamic lesions likely primarily perivascular CSF spaces but can't exclude tiny chronic lacunar infarctions. 3. No other significant intracranial abnormality or change. CTA of head and neck: 1. No hemodynamically significant cervical vascular stenosis. 2. Basically unremarkable brain CTA. Echocardiogram: pending  Lipid panel:   Lab Results   Component Value Date/Time    Cholesterol, total 127 07/28/2018 06:06 AM    HDL Cholesterol 35 (L) 07/28/2018 06:06 AM    LDL, calculated 61.6 07/28/2018 06:06 AM    VLDL, calculated 30.4 07/28/2018 06:06 AM    Triglyceride 152 (H) 07/28/2018 06:06 AM    CHOL/HDL Ratio 3.6 07/28/2018 06:06 AM     HbA1c:   Lab Results   Component Value Date/Time    Hemoglobin A1c 8.2 (H) 07/28/2018 06:06 AM     REVIEW OF SYSTEMS: Denies chest pain, abdominal pain. Headache+, nausea+    OBJECTIVE:      Visit Vitals    /68 (BP 1 Location: Right arm, BP Patient Position: At rest;Supine; Head of bed elevated (Comment degrees))    Pulse 75    Temp 98.7 °F (37.1 °C)    Resp 16    Ht 5' 4\" (1.626 m)    Wt 90.7 kg (200 lb)    SpO2 94%    BMI 34.33 kg/m2     Physical Exam:  GEN: Alert, NAD  EYES: conjunctiva normal, lids with out lesions  HEENT: MMM.   HEART: RRR +S1 +S2  LUNGS: CTA B/L no rales or rhonchi. ABDOMEN: Soft, non-tender. EXTREMITIES: No edema cyanosis  SKIN: no rashes or skin breakdown, no nodules  NEURO: Alert, oriented x3. Speech is fluent. Cranials: Face is symmetric. Smiles symmetrically. EOMI, VFF. Tongue is midline. Motor: Full strength at all sites. No pronator drift. Sensory: Intact to crude touch on all four. Coordination: Intact with no dysmetria during FNF.      Current Facility-Administered Medications   Medication Dose Route Frequency    losartan/hydroCHLOROthiazide (HYZAAR) 100/12.5 mg   Oral DAILY    warfarin (COUMADIN) tablet 7.5 mg  7.5 mg Oral ONCE    [START ON 7/30/2018] gabapentin (NEURONTIN) capsule 300 mg  300 mg Oral DAILY    gabapentin (NEURONTIN) capsule 600 mg  600 mg Oral QPM    nortriptyline (PAMELOR) capsule 100 mg  100 mg Oral QHS    atenolol (TENORMIN) tablet 200 mg  200 mg Oral DAILY    cefTRIAXone (ROCEPHIN) 1 g in sterile water (preservative free) 10 mL IV syringe  1 g IntraVENous Q24H    insulin glargine (LANTUS) injection 15 Units  15 Units SubCUTAneous QHS    Warfarin - Pharmacy to Dose  1 Each Other Rx Dosing/Monitoring    HYDROcodone-acetaminophen (NORCO) 5-325 mg per tablet 1 Tab  1 Tab Oral Q6H PRN    aspirin chewable tablet 81 mg  81 mg Oral DAILY    umeclidinium (INCRUSE ELLIPTA) 62.5 mcg/actuation  1 Puff Inhalation DAILY    sodium chloride (NS) flush 5-10 mL  5-10 mL IntraVENous Q8H    sodium chloride (NS) flush 5-10 mL  5-10 mL IntraVENous PRN    insulin lispro (HUMALOG) injection   SubCUTAneous AC&HS    glucose chewable tablet 16 g  4 Tab Oral PRN    glucagon (GLUCAGEN) injection 1 mg  1 mg IntraMUSCular PRN    dextrose (D50W) injection syrg 12.5-25 g  25-50 mL IntraVENous PRN    ondansetron (ZOFRAN) injection 4 mg  4 mg IntraVENous Q6H PRN    labetalol (NORMODYNE;TRANDATE) injection 5 mg  5 mg IntraVENous Q10MIN PRN    famotidine (PEPCID) tablet 20 mg  20 mg Oral BID    atorvastatin (LIPITOR) tablet 40 mg  40 mg Oral QHS    enoxaparin (LOVENOX) injection 40 mg  40 mg SubCUTAneous Q24H    acetaminophen (TYLENOL) tablet 650 mg  650 mg Oral Q6H PRN       Laboratory  Recent Results (from the past 24 hour(s))   CULTURE, BLOOD    Collection Time: 07/28/18 12:09 PM   Result Value Ref Range    Special Requests: NO SPECIAL REQUESTS      Culture result: NO GROWTH AFTER 17 HOURS     CULTURE, BLOOD    Collection Time: 07/28/18 12:11 PM   Result Value Ref Range    Special Requests: NO SPECIAL REQUESTS      Culture result: NO GROWTH AFTER 17 HOURS     GLUCOSE, POC    Collection Time: 07/28/18  1:47 PM   Result Value Ref Range    Glucose (POC) 235 (H) 70 - 110 mg/dL   GLUCOSE, POC    Collection Time: 07/28/18  4:57 PM   Result Value Ref Range    Glucose (POC) 217 (H) 70 - 110 mg/dL   GLUCOSE, POC    Collection Time: 07/28/18  9:06 PM   Result Value Ref Range    Glucose (POC) 134 (H) 70 - 110 mg/dL   CBC W/O DIFF    Collection Time: 07/29/18  3:32 AM   Result Value Ref Range    WBC 5.9 4.6 - 13.2 K/uL    RBC 3.71 (L) 4.20 - 5.30 M/uL    HGB 10.5 (L) 12.0 - 16.0 g/dL    HCT 32.2 (L) 35.0 - 45.0 %    MCV 86.8 74.0 - 97.0 FL    MCH 28.3 24.0 - 34.0 PG    MCHC 32.6 31.0 - 37.0 g/dL    RDW 16.2 (H) 11.6 - 14.5 %    PLATELET 167 688 - 283 K/uL    MPV 10.2 9.2 - 57.6 FL   METABOLIC PANEL, BASIC    Collection Time: 07/29/18  3:32 AM   Result Value Ref Range    Sodium 142 136 - 145 mmol/L    Potassium 3.5 3.5 - 5.5 mmol/L    Chloride 106 100 - 108 mmol/L    CO2 28 21 - 32 mmol/L    Anion gap 8 3.0 - 18 mmol/L    Glucose 170 (H) 74 - 99 mg/dL    BUN 5 (L) 7.0 - 18 MG/DL    Creatinine 0.76 0.6 - 1.3 MG/DL    BUN/Creatinine ratio 7 (L) 12 - 20      GFR est AA >60 >60 ml/min/1.73m2    GFR est non-AA >60 >60 ml/min/1.73m2    Calcium 8.9 8.5 - 10.1 MG/DL   PROTHROMBIN TIME + INR    Collection Time: 07/29/18  3:32 AM   Result Value Ref Range    Prothrombin time 16.9 (H) 11.5 - 15.2 sec    INR 1.4 (H) 0.8 - 1.2     GLUCOSE, POC    Collection Time: 07/29/18  6:06 AM Result Value Ref Range    Glucose (POC) 152 (H) 70 - 110 mg/dL       Radiology:  Xr Chest Pa Lat    Result Date: 7/23/2018  EXAM: Chest 2 views INDICATION: Chest pressure COMPARISON: Single view chest 11/21/2017 _______________ FINDINGS: PA and lateral chest films were performed. Lungs are mildly hyperinflated. No focal infiltrate. Calcified granuloma right lower lobe is unchanged. No effusion or pneumothorax. Heart and pulmonary vascularity are normal. _______________     IMPRESSION: 1. No acute cardiopulmonary disease. Mri Brain Wo Cont    Result Date: 7/28/2018  Brain MR without contrast: Indication: Possible acute infarction. History of infarction. Acute right-sided weakness and inability to speak. Procedure: Sagittal spin echo T1, axial FSE FLAIR and T2 and axial diffusion weighted scanning was performed. Coronal spin echo T1 and T2 FSE images were also performed. No contrast was administered. Comparison head CT 7/27 18. Comparison brain MRI 12/18/2015. Findings: Diffusion:  There are no areas of restricted diffusion, no evidence of an acute infarction. The axial T2 star gradient echo examination does not demonstrate acute or chronic hemorrhage or abnormal calcification. Brain parenchyma:  Moderately prominent patchy multifocal ischemic changes periventricular deep and mildly in the peripheral subcortical white matter of both hemispheres are noted. Numerous high T2 signal areas in the basal ganglia and thalami are in majority fluid signal. No mass or mass effect. Ventricles and sulci:  Normal.  No significant atrophy given age. Extra-axial:  No extra-axial fluid collection or mass is noted. Brain vasculature:  No vascular abnormality is appreciated on this routine brain MR examination. Craniocervical junction:  Normal. Skull base, extracranial and calvarium:  Bilateral proptosis by imaging criteria likely from lipomatosis and shallow orbits. Bilateral cataract surgery.  There is a right occipital scalp lipoma just overlying the occipital bone. Impression: 1. No acute hemorrhage or acute infarction. 2. White matter abnormality is nonspecific but likely ischemic. This is stable. Basal ganglia and thalamic lesions likely primarily perivascular CSF spaces but can't exclude tiny chronic lacunar infarctions. 3. No other significant intracranial abnormality or change. Ct Head Wo Cont    Result Date: 7/27/2018  EXAM: CT head INDICATION: Right-sided weakness. Difficulty speaking. COMPARISON: CT head July 23, 2018 TECHNIQUE: Axial CT imaging of the head was performed without intravenous contrast. One or more dose reduction techniques were used on this CT: automated exposure control, adjustment of the mAs and/or kVp according to patient's size, and iterative reconstruction techniques. The specific techniques utilized on this CT exam have been documented in the patient's electronic medical record. _______________ FINDINGS: BRAIN AND POSTERIOR FOSSA: Cortical sulci volume remains within normal limits for patient age. Ventricular size and configuration is stable. Basilar cisterns remain patent. Physiologic bilateral basal ganglia calcifications redemonstrated, unchanged. Moderate subcortical and periventricular white matter hypoattenuation similar to prior examination. There is no intracranial hemorrhage, mass effect, or midline shift. The gray-white matter differentiation is within normal limits. EXTRA-AXIAL SPACES AND MENINGES: There are no abnormal extra-axial fluid collections. CALVARIUM: Intact. SINUSES: Imaged paranasal sinuses and mastoid air cells are clear. OTHER: Atherosclerotic vascular calcifications of the carotid siphons are present bilaterally. _______________     IMPRESSION: 1. No acute intracranial abnormality. Stable examination. 2. Moderate subcortical and periventricular white matter hypoattenuation; unchanged and nonspecific, likely reflecting sequela of chronic ischemic microvascular change. Critical result: CODE S exam findings conveyed to physician's assistant Mona Baez in the ED prior to dictation at 1:45 PM on 7/27/2018. Ct Head Wo Cont    Result Date: 7/23/2018  EXAM: CT head INDICATION: Posterior headache for one week COMPARISON: Noncontrast CT brain 12/7/2015] TECHNIQUE: Axial CT imaging of the head was performed without intravenous contrast. Coronal and sagittal reconstructions were performed. One or more dose reduction techniques were used on this CT: automated exposure control, adjustment of the mAs and/or kVp according to patient's size, and iterative reconstruction techniques. The specific techniques utilized on this CT exam have been documented in the patient's electronic medical record. . _______________ FINDINGS: BRAIN AND POSTERIOR FOSSA: Ventricles are normal in size and surface sulci. There is extensive white matter hypoattenuation which is unchanged from prior exam. There is no intracranial hemorrhage, mass effect, or midline shift. No evidence of acute cortical infarct. EXTRA-AXIAL SPACES AND MENINGES: There are no abnormal extra-axial fluid collections. CALVARIUM: Intact. SINUSES: Clear. OTHER: Visualized orbital structures and mastoid air cells are unremarkable. _______________     IMPRESSION: 1. No evidence of acute infarct, hemorrhage or mass. 2. Stable extensive bilateral white matter hypodensity consistent with microvascular ischemic disease. Xr Chest Port    Result Date: 7/27/2018  Chest, single view Indication: Right-sided weakness with difficulty speaking. Possible stroke. Comparison: Several prior exams, most recently July 23, 2018 Findings:  Portable upright AP view of the chest was obtained. The cardiomediastinal silhouette is within normal limits. The pulmonary vasculature is unremarkable. Lung parenchyma is well aerated, without focal consolidation. Calcified granuloma right lung base, unchanged. No pleural effusion nor pneumothorax.   No acute osseous abnormality. Impression: No radiographic evidence of an acute abnormality. Cta Head Neck W Cont    Result Date: 7/28/2018  EXAM:  CT angiogram of the head and neck with intravenous contrast. COMPARISON: None. INDICATION: Right-sided weakness and speech difficulty. I'm not certain as to the reason that the brain and neck vascular imaging was not performed at the time of MRI which was performed yesterday. TECHNIQUE:  Three-dimensional, maximum intensity projection, and curved planar reformations were post-processed at a dedicated outside facility ([employer]). Stenoses are reported with reference to the downstream lumen (\"NASCET\"). DOSE REDUCTION: One or more dose reduction techniques were used on this CT: automated exposure control, adjustment of the mAs and/or kVp according to patient's size, and iterative reconstruction techniques. The specific techniques utilized on this CT exam have been documented in the patient's electronic medical record. _______________ FINDINGS:      ARTERIAL BOLUS QUALITY:  Diagnostic. AORTIC ARCH:  Normal branching pattern. There is some calcification at the origin of the great vessels but no substantial great vessel stenosis. RIGHT CAROTID ARTERY:  Mild calcified plaque carotid bulb but no diameter stenosis. LEFT CAROTID ARTERY:  No significant stenosis. VERTEBRAL ARTERIES:  Very mildly right dominant. Both vertebral arteries are felt to be patent. The V1 segment of the left vertebral artery is not quite as well seen because of refluxing contrast from the left arm injection into adjacent vertebral veins but the vessel is felt likely to be normal. Remainder left vertebral widely patent. BASILAR AND CEREBRAL ARTERIES: Calcified plaque carotid siphon. No substantial siphon or supraclinoid internal carotid artery stenosis. M1 segment and proximal M2 segments are widely patent without stenosis or occlusion.  A1 segments and anterior communicating artery region are markable for 3 A2 segments, 2 on the left with one of the left A2 segments arising from the distal anterior communicating artery. The anterior communicating artery between this vessel and the left A1 A2 junction is a quite small. No saccular aneurysm. Vertebral basilar system is without aneurysm or stenosis. No distal MCA or BARB stenosis. No other intracranial vascular abnormality. NON-ANGIOGRAPHIC FINDINGS: No apical lung mass. No neck soft tissue abnormality is appreciated. No parenchymal brain abnormality. _______________     IMPRESSION: 1. No hemodynamically significant cervical vascular stenosis. 2. Basically unremarkable brain CTA. ASSESSMENT/IMPRESSION:   Possibly TIA, but given the presence of headaches with nausea and nothing to support a vascular etiology, I feel her presentation is most suggestive of migraine headaches with atypical features. We will give Depakote to help with the headaches.     RECOMMENDATIONS:  1. Continue aspirin 81mg and Atorvastatin 40mg daily  2. Treatment of UTI  3. Butalbital combination tabs for headache will be ordered. We will also start Depakote with an IV loading dose. 4. IV or oral liquids. 5. Continue tele monitoring  6. Continue Coumadin with goal INR 2 to 3.  7. Neuro checks per floor protocol  8. Echo pending. I will follow the patient. Please do not hesitate to return with any questions.     Signed:  Omer Price MD  7/29/2018  12:00 PM

## 2018-07-29 NOTE — PROGRESS NOTES
2010: Shift assessment complete. Pt initially said she had a \"rash\"; when RN went to assess stated rash she then stated \"well there's nothing there but I'm itching everywhere. Pt stated \"medications make me itch. My primary doctor had to give me benadryl. \" RN called Dr. Sophie Holder. He ordered 50 benadryl PO. x1 BP also elevated. Will administered hydralazine prn as ordered. 2030: IV hydral administered as ordered. Will continue to monitor. Pt resting in bed. Spouse at bedside. 2055: Pt ambulated to [facility] stated that she was having SOB. O2 sat 97% RR 18. Pt returned to bed without incident. Pt informed that there will be a bedside commode in use to prevent any further dyspnea on exertion. HR: 99. Pt stated that she had not had her inhaler today. Scheduled Inhaler (Elipta) administered. Pt educated on s/s of anxiety and relaxation techniques. 2130: Pt placed on bedpan by KELSY Coulter. 400 out    2200: Spouse returned to pt room and was asking pt questions about previous incident noted 2055, and writing them down in a notebook. RN provide spouse with details. Spouse had no further questions when asked. 2205: Pt again stated that she was SOB and wanted to use the bathroom again. Pt ambulated to bedside commode with 1 person assist. O2 sat 100% on RA. RR 18. . Returned to bed without incident. Sitting up on side of bed. O2 remained at % RN remained at bedside. 2220: RN leaving bedside. Pt stable. NAD. Spouse also at bedside. 2250: RN back at pt bedside. Pt continues to complain of SOB O2 sat 98%. Pt sitting upright in bed. Placed on 2L NC.     2251: Spouse leaving bedside. Stated that he would be right back. 2253: Pt stated that all the \"tightness and shortness is gone. \" She stated that she needs oxygen sometimes to go bed. When this RN asked if she used O2 at home pt stated \"I use 4 different inhalers. \" RN again asked if she ever had to use oxygen at home and pt stated \"no\".  RN informed pt that we could leave the oxygen on overnight to ease her breathing/anxiety about her breathing. Pt verbalized relief. 2354: Pt ambulated to bedside commode with 1 person assistance. Pt returned to bed without incident. Remains on nasal cannula 2L. Pt stated that she is now having abdominal cramps. RN asked if it felt like she needed to have a bowel movement? Pt stated yes but she could not go at this time. Pt is passing gas. RN offered prune juice. Pt refused. Spouse returned to bedside. 4354: Spouse leaving bedside. Pt laying in bed. Chest rise and fall. NAD. Call bell within reach. 0245: Pt complained of HA 9/10. Tylenol administered prn. Will continue to monitor. Pt ambulated to bedside commode and returned to bed without incident. 0330: Pt stated that HA has resolved. But now she can't sleep because her pump continues to beep. Pt is currently receiving mx fluids. Pump has alarmed 2 times this shift to this RNs knowledge. Pt is refusing mx fluids at this time. 0645: 2 units of insuline required (SS). Pt again complaining of HA. RN reinforced relaxation techniques. Pt informed that the next dose of tylenol can be administered in approx 2 hours. Pt had several somatic complaints throughout the shift. No changes to pt physical condition. Bedside and Verbal shift change report given to Bethany Merrill RN (oncoming nurse) by Alycia Savage RN (off-going nurse). Report included the following information SBAR, Kardex, Intake/Output, MAR, Recent Results, Med Rec Status and anticipated POC. Pt stable. In NAD.         Vaishnavi Becker RN

## 2018-07-29 NOTE — PROGRESS NOTES
Problem: Mobility Impaired (Adult and Pediatric)  Goal: *Acute Goals and Plan of Care (Insert Text)  Physical Therapy Goals  Initiated 7/28/2018 and to be accomplished within 3-7 day(s)  1. Patient will move from supine to sit and sit to supine  in bed with supervision/set-up. 2.  Patient will transfer from bed to chair and chair to bed with supervision/set-up using the least restrictive device. 3.  Patient will perform sit to stand with supervision/set-up. 4.  Patient will ambulate with supervision/set-up for 150 feet with the least restrictive device. 5.  Patient will ascend/descend 16 stairs with B handrail(s) with supervision/set-up. Outcome: Progressing Towards Goal  physical Therapy TREATMENT    Patient: Tonya Mg (18 y.o. female)  Date: 7/29/2018  Diagnosis: TIA (transient ischemic attack)  CVA (cerebral vascular accident) ([facility] [address]  Slurred speech  Subtherapeutic anticoagulation Expressive aphasia  Precautions: Fall   Chart, physical therapy assessment, plan of care and goals were reviewed. ASSESSMENT:  Pt continues to report ongoing HA and lightheadedness. Pt iniailly found without O2 in place. Sat 77% at that time. Edu pt to keep in place. O2 increased to WNLs on 2L. Pt able to maintain 100' ambulation distance with RW CGA for safety. Multiple standing rest needed. O2 remains WNLs post amb on 2L. Cont POC. Progression toward goals:  []      Improving appropriately and progressing toward goals  [x]      Improving slowly and progressing toward goals  []      Not making progress toward goals and plan of care will be adjusted     PLAN:  Patient continues to benefit from skilled intervention to address the above impairments. Continue treatment per established plan of care.   Discharge Recommendations:  Home Health  Further Equipment Recommendations for Discharge:  rolling walker     SUBJECTIVE:   Patient stated  I cant eat, I've had nausea       OBJECTIVE DATA SUMMARY:   Critical Behavior:  Neurologic State: Alert  Orientation Level: Oriented X4  Cognition: Appropriate decision making, Appropriate for age attention/concentration, Appropriate safety awareness, Follows commands  Safety/Judgement: Awareness of environment  Functional Mobility Training:    Balance:  Sitting: Intact  Standing: Intact; With support  Ambulation/Gait Training:  Distance (ft): 100 Feet (ft)  Assistive Device: Walker, rolling;Gait belt (Portable O2)  Ambulation - Level of Assistance: Contact guard assistance  Gait Abnormalities: Decreased step clearance;Trunk sway increased  Base of Support: Widened;Shift to left  Stance: Right decreased  Speed/Xochilt: Slow  Step Length: Right shortened;Left shortened    Activity Tolerance:   Fair     After treatment:   [] Patient left in no apparent distress sitting up in chair  [x] Patient left in no apparent distress in bed(EOB)   [x] Call bell left within reach  [x] Nursing notified  [x] Caregiver present  [] Bed alarm activated      Walker WALTER Bell   Time Calculation: 26 mins

## 2018-07-29 NOTE — PROGRESS NOTES
Hospitalist Progress Note    Patient: Yamila Palma MRN: [id number]  CSN: [id number]    YOB: 1945  Age: 71 y.o. Sex: female    DOA: 7/27/2018 LOS:  LOS: 2 days          Chief Complaint:    Headache    Assessment/Plan     1. Headache  2. HTN  3. IDDM with Neuropathy  4. Asthma  5. Coumadin Use for Hx of Clots  6. UTI    1. Discussed the case with Dr Ron Milton, possible migraine with atypical features. Will start IV depakote, PO Fioricet and monitor. Discontinued prn Norco. MRI negative for CVA. Echo pending. Patient also needs to have MBS, has been ordered but not done yet. 2. Continue Hyzaar, added PTA atenolol, continue to monitor. Will get a new set of vitals now. 3. Continue SSI, monitor for hypoglycemia. Continue gabapentin for neuropathic pain. 5. Continue pharmacy to dose coumadin. 6. Continue rocephin. Urine has been sent for culture, which is in progress with no prelim data yet. Adjust abx as necessary. DVT PPx - Lovenox, Coumadin  Dispo: 1-2 days    Patient Active Problem List   Diagnosis Code    HTN (hypertension), benign I10    Asthma J45.909    Neuropathy G62.9    Hyperlipidemia E78.5    Dizziness R42    Chest pain in adult R07.9    COPD (chronic obstructive pulmonary disease) (HCC) J44.9    TIA (transient ischemic attack) G45.9    Expressive aphasia R47.01    CVA [facility]) ([facility] I63.9    Subtherapeutic anticoagulation Z51.81, Z79.01       Subjective:    Constant headache without relief.      Review of systems:    Constitutional: denies fevers, chills, myalgias  Respiratory: denies SOB, cough  Cardiovascular: denies chest pain, palpitations  Gastrointestinal: denies nausea, vomiting, diarrhea      Vital signs/Intake and Output:  Visit Vitals    /86 (BP 1 Location: Right arm, BP Patient Position: Sitting)    Pulse 76    Temp 98.8 °F (37.1 °C)    Resp 16    Ht 5' 4\" (1.626 m)    Wt 90.7 kg (200 lb)    SpO2 98%    BMI 34.33 kg/m2 Current Shift:  07/29 0701 - 07/29 1900  In: 240 [P.O.:240]  Out: -   Last three shifts:  07/27 1901 - 07/29 0700  In: 200 [P.O.:200]  Out: 2200 [Urine:2200]    Exam:    General: Elderly female, alert, NAD, OX3  Head/Neck: NCAT, supple, No masses, No lymphadenopathy  CVS:Regular rate and rhythm, no M/R/G, S1/S2 heard, no thrill  Lungs:Clear to auscultation bilaterally, no wheezes, rhonchi, or rales  Abdomen: Soft, Nontender, No distention, Normal Bowel sounds, No hepatomegaly  Extremities: No C/C/E, pulses palpable 2+  Skin:normal texture and turgor, no rashes, no lesions  Neuro:grossly normal , follows commands  Psych:appropriate                Labs: Results:       Chemistry Recent Labs      07/29/18 0332 07/28/18   0606 07/27/18   1335   GLU  170*  96  105*   NA  142  139  139   K  3.5  3.5  3.9   CL  106  104  101   CO2  28  29  27   BUN  5*  9  12   CREA  0.76  0.80  1.07   CA  8.9  8.6  8.9   AGAP  8  6  11   BUCR  7*  11*  11*   AP   --    --   85   TP   --    --   7.9   ALB   --    --   3.5   GLOB   --    --   4.4*   AGRAT   --    --   0.8      CBC w/Diff Recent Labs      07/29/18 0332 07/28/18   0606 07/27/18   1335   WBC  5.9  4.1*  5.1   RBC  3.71*  3.65*  4.03*   HGB  10.5*  10.2*  11.2*   HCT  32.2*  31.8*  35.1   PLT  272  257  284   GRANS   --    --   48   LYMPH   --    --   45   EOS   --    --   1      Cardiac Enzymes Recent Labs      07/27/18   1335   CPK  76   CKND1  2.0      Coagulation Recent Labs      07/29/18 0332 07/27/18   1335   PTP  16.9*  15.2   INR  1.4*  1.3*   APTT   --   36.8*       Lipid Panel Lab Results   Component Value Date/Time    Cholesterol, total 127 07/28/2018 06:06 AM    HDL Cholesterol 35 (L) 07/28/2018 06:06 AM    LDL, calculated 61.6 07/28/2018 06:06 AM    VLDL, calculated 30.4 07/28/2018 06:06 AM    Triglyceride 152 (H) 07/28/2018 06:06 AM    CHOL/HDL Ratio 3.6 07/28/2018 06:06 AM      BNP No results for input(s): BNPP in the last 72 hours.    Liver Enzymes Recent Labs      07/27/18   1335   TP  7.9   ALB  3.5   AP  85   SGOT  22      Thyroid Studies Lab Results   Component Value Date/Time    TSH 1.40 07/27/2018 01:35 PM        Procedures/imaging: see electronic medical records for all procedures/Xrays and details which were not copied into this note but were reviewed prior to creation of [address], PA-C

## 2018-07-30 ENCOUNTER — HOME HEALTH ADMISSION (OUTPATIENT)
Dept: HOME HEALTH SERVICES | Facility: HOME HEALTH | Age: 73
End: 2018-07-30

## 2018-07-30 ENCOUNTER — APPOINTMENT (OUTPATIENT)
Dept: GENERAL RADIOLOGY | Age: 73
DRG: 069 | End: 2018-07-30
Attending: HOSPITALIST
Payer: MEDICARE

## 2018-07-30 VITALS
HEART RATE: 60 BPM | BODY MASS INDEX: 34.15 KG/M2 | RESPIRATION RATE: 16 BRPM | SYSTOLIC BLOOD PRESSURE: 134 MMHG | DIASTOLIC BLOOD PRESSURE: 65 MMHG | WEIGHT: 200 LBS | TEMPERATURE: 98.6 F | OXYGEN SATURATION: 97 % | HEIGHT: 64 IN

## 2018-07-30 LAB
ANION GAP SERPL CALC-SCNC: 6 MMOL/L (ref 3–18)
BACTERIA SPEC CULT: NORMAL
BUN SERPL-MCNC: 8 MG/DL (ref 7–18)
BUN/CREAT SERPL: 9 (ref 12–20)
CALCIUM SERPL-MCNC: 9.1 MG/DL (ref 8.5–10.1)
CHLORIDE SERPL-SCNC: 105 MMOL/L (ref 100–108)
CO2 SERPL-SCNC: 32 MMOL/L (ref 21–32)
CREAT SERPL-MCNC: 0.91 MG/DL (ref 0.6–1.3)
ERYTHROCYTE [DISTWIDTH] IN BLOOD BY AUTOMATED COUNT: 16.2 % (ref 11.6–14.5)
GLUCOSE BLD STRIP.AUTO-MCNC: 122 MG/DL (ref 70–110)
GLUCOSE BLD STRIP.AUTO-MCNC: 204 MG/DL (ref 70–110)
GLUCOSE SERPL-MCNC: 119 MG/DL (ref 74–99)
HCT VFR BLD AUTO: 35.2 % (ref 35–45)
HGB BLD-MCNC: 11.1 G/DL (ref 12–16)
INR PPP: 1.4 (ref 0.8–1.2)
MCH RBC QN AUTO: 27.8 PG (ref 24–34)
MCHC RBC AUTO-ENTMCNC: 31.5 G/DL (ref 31–37)
MCV RBC AUTO: 88.2 FL (ref 74–97)
PLATELET # BLD AUTO: 263 K/UL (ref 135–420)
PMV BLD AUTO: 9.9 FL (ref 9.2–11.8)
POTASSIUM SERPL-SCNC: 3.6 MMOL/L (ref 3.5–5.5)
PROTHROMBIN TIME: 17.4 SEC (ref 11.5–15.2)
RBC # BLD AUTO: 3.99 M/UL (ref 4.2–5.3)
SERVICE CMNT-IMP: NORMAL
SODIUM SERPL-SCNC: 143 MMOL/L (ref 136–145)
WBC # BLD AUTO: 4.9 K/UL (ref 4.6–13.2)

## 2018-07-30 PROCEDURE — 97166 OT EVAL MOD COMPLEX 45 MIN: CPT

## 2018-07-30 PROCEDURE — 92611 MOTION FLUOROSCOPY/SWALLOW: CPT

## 2018-07-30 PROCEDURE — 36415 COLL VENOUS BLD VENIPUNCTURE: CPT | Performed by: PHYSICIAN ASSISTANT

## 2018-07-30 PROCEDURE — 85610 PROTHROMBIN TIME: CPT | Performed by: PHYSICIAN ASSISTANT

## 2018-07-30 PROCEDURE — 74011250636 HC RX REV CODE- 250/636: Performed by: HOSPITALIST

## 2018-07-30 PROCEDURE — 74011250637 HC RX REV CODE- 250/637: Performed by: PSYCHIATRY & NEUROLOGY

## 2018-07-30 PROCEDURE — 97167 OT EVAL HIGH COMPLEX 60 MIN: CPT

## 2018-07-30 PROCEDURE — 80048 BASIC METABOLIC PNL TOTAL CA: CPT | Performed by: PHYSICIAN ASSISTANT

## 2018-07-30 PROCEDURE — 97535 SELF CARE MNGMENT TRAINING: CPT

## 2018-07-30 PROCEDURE — 74011000255 HC RX REV CODE- 255: Performed by: HOSPITALIST

## 2018-07-30 PROCEDURE — 85027 COMPLETE CBC AUTOMATED: CPT | Performed by: PHYSICIAN ASSISTANT

## 2018-07-30 PROCEDURE — 82962 GLUCOSE BLOOD TEST: CPT

## 2018-07-30 PROCEDURE — 74011250637 HC RX REV CODE- 250/637: Performed by: PHYSICIAN ASSISTANT

## 2018-07-30 PROCEDURE — 74230 X-RAY XM SWLNG FUNCJ C+: CPT

## 2018-07-30 PROCEDURE — 74011000250 HC RX REV CODE- 250: Performed by: HOSPITALIST

## 2018-07-30 PROCEDURE — 97116 GAIT TRAINING THERAPY: CPT

## 2018-07-30 PROCEDURE — 92526 ORAL FUNCTION THERAPY: CPT

## 2018-07-30 RX ORDER — DIVALPROEX SODIUM 500 MG/1
500 TABLET, EXTENDED RELEASE ORAL 2 TIMES DAILY
Status: DISCONTINUED | OUTPATIENT
Start: 2018-07-30 | End: 2018-07-30 | Stop reason: HOSPADM

## 2018-07-30 RX ORDER — DIVALPROEX SODIUM 500 MG/1
500 TABLET, EXTENDED RELEASE ORAL 2 TIMES DAILY
Qty: 60 TAB | Refills: 0 | Status: SHIPPED | OUTPATIENT
Start: 2018-07-30 | End: 2021-08-12

## 2018-07-30 RX ORDER — BUTALBITAL, ACETAMINOPHEN AND CAFFEINE 50; 325; 40 MG/1; MG/1; MG/1
1 TABLET ORAL
Qty: 10 TAB | Refills: 0 | Status: SHIPPED | OUTPATIENT
Start: 2018-07-30 | End: 2018-10-12

## 2018-07-30 RX ORDER — ATORVASTATIN CALCIUM 40 MG/1
40 TABLET, FILM COATED ORAL
Qty: 30 TAB | Refills: 0 | Status: SHIPPED | OUTPATIENT
Start: 2018-07-30

## 2018-07-30 RX ADMIN — UMECLIDINIUM 1 PUFF: 62.5 AEROSOL, POWDER ORAL at 12:00

## 2018-07-30 RX ADMIN — BARIUM SULFATE 700 MG: 700 TABLET ORAL at 09:45

## 2018-07-30 RX ADMIN — BARIUM SULFATE 20 G: 400 PASTE ORAL at 09:45

## 2018-07-30 RX ADMIN — ATENOLOL 200 MG: 50 TABLET ORAL at 10:01

## 2018-07-30 RX ADMIN — BARIUM SULFATE 90 G: 960 POWDER, FOR SUSPENSION ORAL at 09:44

## 2018-07-30 RX ADMIN — FAMOTIDINE 20 MG: 20 TABLET ORAL at 10:01

## 2018-07-30 RX ADMIN — DIVALPROEX SODIUM 500 MG: 500 TABLET, EXTENDED RELEASE ORAL at 10:01

## 2018-07-30 RX ADMIN — HYDROCHLOROTHIAZIDE: 12.5 CAPSULE ORAL at 10:01

## 2018-07-30 RX ADMIN — GABAPENTIN 300 MG: 300 CAPSULE ORAL at 10:01

## 2018-07-30 RX ADMIN — Medication 10 ML: at 06:00

## 2018-07-30 RX ADMIN — ASPIRIN 81 MG 81 MG: 81 TABLET ORAL at 10:00

## 2018-07-30 NOTE — DISCHARGE INSTRUCTIONS
DISCHARGE SUMMARY from Nurse    PATIENT INSTRUCTIONS:      What to do at Home:  Recommended activity: Activity as tolerated. *  Please give a list of your current medications to your Primary Care Provider. *  Please update this list whenever your medications are discontinued, doses are      changed, or new medications (including over-the-counter products) are added. *  Please carry medication information at all times in case of emergency situations. These are general instructions for a healthy lifestyle:    No smoking/ No tobacco products/ Avoid exposure to second hand smoke  Surgeon General's Warning:  Quitting smoking now greatly reduces serious risk to your health. Obesity, smoking, and sedentary lifestyle greatly increases your risk for illness    A healthy diet, regular physical exercise & weight monitoring are important for maintaining a healthy lifestyle    You may be retaining fluid if you have a history of heart failure or if you experience any of the following symptoms:  Weight gain of 3 pounds or more overnight or 5 pounds in a week, increased swelling in our hands or feet or shortness of breath while lying flat in bed. Please call your doctor as soon as you notice any of these symptoms; do not wait until your next office visit. Recognize signs and symptoms of STROKE:    F-face looks uneven    A-arms unable to move or move unevenly    S-speech slurred or non-existent    T-time-call 911 as soon as signs and symptoms begin-DO NOT go       Back to bed or wait to see if you get better-TIME IS BRAIN. Warning Signs of HEART ATTACK     Call 911 if you have these symptoms:   Chest discomfort. Most heart attacks involve discomfort in the center of the chest that lasts more than a few minutes, or that goes away and comes back. It can feel like uncomfortable pressure, squeezing, fullness, or pain.  Discomfort in other areas of the upper body.  Symptoms can include pain or discomfort in one or both arms, the back, neck, jaw, or stomach.  Shortness of breath with or without chest discomfort.  Other signs may include breaking out in a cold sweat, nausea, or lightheadedness. Don't wait more than five minutes to call 911 - MINUTES MATTER! Fast action can save your life. Calling 911 is almost always the fastest way to get lifesaving treatment. Emergency Medical Services staff can begin treatment when they arrive -- up to an hour sooner than if someone gets to the hospital by car. The discharge information has been reviewed with the patient. The patient verbalized understanding. Discharge medications reviewed with the patient and appropriate educational materials and side effects teaching were provided.   ___________________________________________________________________________________________________________________________________

## 2018-07-30 NOTE — DISCHARGE SUMMARY
Discharge Summary    Patient: Minh Oropeza MRN: [id number]  CSN: [id number]    YOB: 1945  Age: 71 y.o. Sex: female    DOA: 7/27/2018 LOS:  LOS: 3 days   Discharge Date:      Primary Care Provider:  Rubia Dsouza MD    Admission Diagnoses: TIA (transient ischemic attack)  CVA (cerebral vascular accident) ([facility]  Slurred speech  Subtherapeutic anticoagulation    Discharge Diagnoses:    Problem List as of 7/30/2018  Date Reviewed: 2/22/2017          Codes Class Noted - Resolved    TIA (transient ischemic attack) ICD-10-CM: G45.9  ICD-9-CM: 435.9  7/27/2018 - Present        * (Principal)Expressive aphasia ICD-10-CM: R47.01  ICD-9-CM: 784.3  7/27/2018 - Present        CVA (cerebral vascular accident) [facility]) ICD-10-CM: I63.9  ICD-9-CM: 434.91  7/27/2018 - Present        Subtherapeutic anticoagulation ICD-10-CM: Z51.81, Z79.01  ICD-9-CM: V58.83, V58.61  7/27/2018 - Present        Chest pain in adult ICD-10-CM: R07.9  ICD-9-CM: 786.50  2/22/2017 - Present        COPD (chronic obstructive pulmonary disease) ([facility] (Chronic) ICD-10-CM: J44.9  ICD-9-CM: 496  2/22/2017 - Present        Dizziness ICD-10-CM: R42  ICD-9-CM: 780.4  12/7/2015 - Present        HTN (hypertension), benign ICD-10-CM: I10  ICD-9-CM: 401.1  Unknown - Present        Asthma ICD-10-CM: J45.909  ICD-9-CM: 493.90  Unknown - Present        Neuropathy ICD-10-CM: G62.9  ICD-9-CM: 355.9  Unknown - Present        Hyperlipidemia ICD-10-CM: E78.5  ICD-9-CM: 272.4  Unknown - Present              Discharge Medications:     Current Discharge Medication List      START taking these medications    Details   atorvastatin (LIPITOR) 40 mg tablet Take 1 Tab by mouth nightly. Qty: 30 Tab, Refills: 0      butalbital-acetaminophen-caffeine (FIORICET, ESGIC) -40 mg per tablet Take 1 Tab by mouth every six (6) hours as needed for Headache.   Qty: 10 Tab, Refills: 0      divalproex ER (DEPAKOTE ER) 500 mg ER tablet Take 1 Tab by mouth two (2) times a day.  Qty: 60 Tab, Refills: 0         CONTINUE these medications which have NOT CHANGED    Details   diphenhydrAMINE (BENADRYL) 25 mg capsule Take 1 Cap by mouth every six (6) hours as needed for up to 10 days. Qty: 20 Cap, Refills: 0      famotidine (PEPCID) 20 mg tablet Take 1 Tab by mouth two (2) times a day for 10 days. Qty: 20 Tab, Refills: 0      warfarin (COUMADIN) 5 mg tablet Take 5 mg by mouth five (5) days a week. TAKE COUMADIN 7.5 MG X 2 DAYS THEN, TAKE COUMADIN 5 MG DAILY NEXT PROTIME WILL BE ON 12/7/17 AT DR. Armstrong Cords OFFICE  Indications: DEEP VEIN THROMBOSIS PREVENTION      lidocaine (LIDODERM) 5 % Apply patch to the affected area for 12 hours a day and remove for 12 hours a day. Qty: 15 Each, Refills: 0      naproxen sodium (ALEVE) 220 mg tablet Take 2 Tabs by mouth two (2) times daily (with meals). Qty: 20 Tab, Refills: 0      atenolol (TENORMIN) 100 mg tablet Take 200 mg by mouth daily. !! gabapentin (NEURONTIN) 300 mg capsule Take 300 mg by mouth daily. 300mg am and 600mg pm      !! gabapentin (NEURONTIN) 300 mg capsule Take 600 mg by mouth every evening. losartan-hydroCHLOROthiazide (HYZAAR) 100-12.5 mg per tablet Take 1 Tab by mouth daily. nortriptyline (PAMELOR) 25 mg capsule Take 100 mg by mouth nightly. mometasone-formoterol (DULERA) 200-5 mcg/actuation HFA inhaler Take 2 Puffs by inhalation two (2) times a day. insulin glargine (LANTUS) 100 unit/mL injection 30 Units by SubCUTAneous route daily. montelukast (SINGULAIR) 10 mg tablet Take 10 mg by mouth every evening. cyanocobalamin 1,000 mcg tablet Take 2,000 mcg by mouth daily. glimepiride (AMARYL) 4 mg tablet Take 4 mg by mouth every morning. aspirin 81 mg chewable tablet Take 1 Tab by mouth daily. Qty: 30 Tab, Refills: 0      tiotropium bromide (SPIRIVA RESPIMAT) 2.5 mcg/actuation mist Take 1 Puff by inhalation two (2) times a day.       metformin (GLUCOPHAGE) 850 mg tablet Take 850 mg by mouth three (3) times daily. !! - Potential duplicate medications found. Please discuss with provider. STOP taking these medications       oxyCODONE-acetaminophen (PERCOCET) 5-325 mg per tablet Comments:   Reason for Stopping:         simvastatin (ZOCOR) 20 mg tablet Comments:   Reason for Stopping:               Discharge Condition: Stable    Procedures : Modified Barium Swallow    Consults: Neurology      PHYSICAL EXAM    Visit Vitals    /66    Pulse 66    Temp 97.8 °F (36.6 °C)    Resp 18    Ht 5' 4\" (1.626 m)    Wt 90.7 kg (200 lb)    SpO2 96%    BMI 34.33 kg/m2     General: Awake, cooperative, no acute distress    HEENT: NC, Atraumatic. PERRLA, EOMI. Anicteric sclerae. Lungs:  CTA Bilaterally. No Wheezing/Rhonchi/Rales. Heart:  Regular  rhythm,  No murmur, No Rubs, No Gallops  Abdomen: Soft, Non distended, Non tender. +Bowel sounds,   Extremities: No c/c/e  Psych:   Not anxious or agitated. Neurologic:  No acute neurological deficits. Admission HPI : Sveta Eaton is a 71 y.o. female who has a past medical history of previous CVA, HTN, dyslipidemia, DM, COPD who presents to the ED today with acute onset of right sided weakness and inability to speak. As she is unable to speak, the patient's  provides the history. He states that after he left for work this morning, he received a call from the patient approximately early afternoon that stated she was having slurred speech and felt like she was unsteady on her feet, bumping into things around the house. He then stated that he felt something wasn't right and brought the patient to the ED. He says he is unsure if the right leg weakness is residual from previous knee surgery in 11/2017. When the patient is asked ROS questions, she is able to respond by nodding or shaking her head. She reports HA, blurred vision, inability to speak, right upper and lower extremity weakness.   She denies chest pain, N/V/D, urinary complaints.      In the ED a 'Code S' was called, teleneuro was consulted and discussed the possibility of tPA with the patient's family, who ultimately declined administration of tPA. Have been asked to admit for further care. Hospital Course : This patient was admitted to medical services on July 27, 2018 for symptoms of aphasia and right sided weakness for a TIA/CVA workup. Her other admission diagnoses include HTN, DM, and COPD. Aphasia / Right-Sided Weakness - upon admission the patient had a CT head which was negative for acute intracranial abnormalities. A MRI was done, which was negative for acute CVA. CTA head and neck was also obtained, which was unremarkable. Lastly an echocardiogram was obtained which showed grade 2 diastolic dysfunction, and there was no evidence of a shunt. Neurology was consulted and Dr Ruth Hernandez saw the patient. During her hospital course, the patient developed headaches, which were subsequently treated with IV and PO depakote as well as fioricet. The patient worked with physical therapy during her hospitalization and their recommendation at the time of discharge is for home health care. Case management has been made aware of this need and are arranging this prior to discharge. HTN - Initially the patient was allowed permissive hypertension as she was being worked up for possible TIA/CVA. After the initial workup was negative, the patient was restarted on her home medications with improvement in her BP. DM - Initially the patient was placed on sliding scale insulin only, while the initial workup was underway. After the workup was negative, the patient was started on 15 units of Lantus at bedtime and continued SSI. She was advised to continue her usual home medications at the time of discharge with follow up with her PCP for continued surveillance and management.      COPD - She was resumed on her usual inhalers and her respiratory status remained stable throughout her admission. A urine sample was sent for culture after a questionable urinalysis was obtained. The culture is showing mixed noe, likely contaminant. The patient was advised to continue her previous dose of Warfarin upon discharge, and to follow up with her PCP for adjustments in dosing as needed.      Activity: PT/OT per Home Health    Diet: Cardiac Diet and Diabetic Diet    Follow-up: PCP    Disposition: Home Health    Minutes spent on discharge: 45       Labs: Results:       Chemistry Recent Labs      07/30/18 0314 07/29/18 0332 07/28/18 0606 07/27/18   1335   GLU  119*  170*  96  105*   NA  143  142  139  139   K  3.6  3.5  3.5  3.9   CL  105  106  104  101   CO2  32  28  29  27   BUN  8  5*  9  12   CREA  0.91  0.76  0.80  1.07   CA  9.1  8.9  8.6  8.9   AGAP  6  8  6  11   BUCR  9*  7*  11*  11*   AP   --    --    --   85   TP   --    --    --   7.9   ALB   --    --    --   3.5   GLOB   --    --    --   4.4*   AGRAT   --    --    --   0.8      CBC w/Diff Recent Labs      07/30/18 0314 07/29/18 0332 07/28/18 0606 07/27/18   1335   WBC  4.9  5.9  4.1*  5.1   RBC  3.99*  3.71*  3.65*  4.03*   HGB  11.1*  10.5*  10.2*  11.2*   HCT  35.2  32.2*  31.8*  35.1   PLT  263  272  257  284   GRANS   --    --    --   48   LYMPH   --    --    --   45   EOS   --    --    --   1      Cardiac Enzymes Recent Labs      07/27/18   1335   CPK  76   CKND1  2.0      Coagulation Recent Labs      07/30/18 0314 07/29/18 0332 07/27/18   1335   PTP  17.4*  16.9*  15.2   INR  1.4*  1.4*  1.3*   APTT   --    --   36.8*       Lipid Panel Lab Results   Component Value Date/Time    Cholesterol, total 127 07/28/2018 06:06 AM    HDL Cholesterol 35 (L) 07/28/2018 06:06 AM    LDL, calculated 61.6 07/28/2018 06:06 AM    VLDL, calculated 30.4 07/28/2018 06:06 AM    Triglyceride 152 (H) 07/28/2018 06:06 AM    CHOL/HDL Ratio 3.6 07/28/2018 06:06 AM      BNP No results for input(s): BNPP in the last 72 hours.   Liver Enzymes Recent Labs      07/27/18   1335   TP  7.9   ALB  3.5   AP  85   SGOT  22      Thyroid Studies Lab Results   Component Value Date/Time    TSH 1.40 07/27/2018 01:35 PM            Significant Diagnostic Studies: Xr Chest Pa Lat    Result Date: 7/23/2018  EXAM: Chest 2 views INDICATION: Chest pressure COMPARISON: Single view chest 11/21/2017 _______________ FINDINGS: PA and lateral chest films were performed. Lungs are mildly hyperinflated. No focal infiltrate. Calcified granuloma right lower lobe is unchanged. No effusion or pneumothorax. Heart and pulmonary vascularity are normal. _______________     IMPRESSION: 1. No acute cardiopulmonary disease. Mri Brain Wo Cont    Result Date: 7/28/2018  Brain MR without contrast: Indication: Possible acute infarction. History of infarction. Acute right-sided weakness and inability to speak. Procedure: Sagittal spin echo T1, axial FSE FLAIR and T2 and axial diffusion weighted scanning was performed. Coronal spin echo T1 and T2 FSE images were also performed. No contrast was administered. Comparison head CT 7/27 18. Comparison brain MRI 12/18/2015. Findings: Diffusion:  There are no areas of restricted diffusion, no evidence of an acute infarction. The axial T2 star gradient echo examination does not demonstrate acute or chronic hemorrhage or abnormal calcification. Brain parenchyma:  Moderately prominent patchy multifocal ischemic changes periventricular deep and mildly in the peripheral subcortical white matter of both hemispheres are noted. Numerous high T2 signal areas in the basal ganglia and thalami are in majority fluid signal. No mass or mass effect. Ventricles and sulci:  Normal.  No significant atrophy given age. Extra-axial:  No extra-axial fluid collection or mass is noted. Brain vasculature:  No vascular abnormality is appreciated on this routine brain MR examination.  Craniocervical junction:  Normal. Skull base, extracranial and calvarium:  Bilateral proptosis by imaging criteria likely from lipomatosis and shallow orbits. Bilateral cataract surgery. There is a right occipital scalp lipoma just overlying the occipital bone. Impression: 1. No acute hemorrhage or acute infarction. 2. White matter abnormality is nonspecific but likely ischemic. This is stable. Basal ganglia and thalamic lesions likely primarily perivascular CSF spaces but can't exclude tiny chronic lacunar infarctions. 3. No other significant intracranial abnormality or change. Ct Head Wo Cont    Result Date: 7/27/2018  EXAM: CT head INDICATION: Right-sided weakness. Difficulty speaking. COMPARISON: CT head July 23, 2018 TECHNIQUE: Axial CT imaging of the head was performed without intravenous contrast. One or more dose reduction techniques were used on this CT: automated exposure control, adjustment of the mAs and/or kVp according to patient's size, and iterative reconstruction techniques. The specific techniques utilized on this CT exam have been documented in the patient's electronic medical record. _______________ FINDINGS: BRAIN AND POSTERIOR FOSSA: Cortical sulci volume remains within normal limits for patient age. Ventricular size and configuration is stable. Basilar cisterns remain patent. Physiologic bilateral basal ganglia calcifications redemonstrated, unchanged. Moderate subcortical and periventricular white matter hypoattenuation similar to prior examination. There is no intracranial hemorrhage, mass effect, or midline shift. The gray-white matter differentiation is within normal limits. EXTRA-AXIAL SPACES AND MENINGES: There are no abnormal extra-axial fluid collections. CALVARIUM: Intact. SINUSES: Imaged paranasal sinuses and mastoid air cells are clear. OTHER: Atherosclerotic vascular calcifications of the carotid siphons are present bilaterally. _______________     IMPRESSION: 1. No acute intracranial abnormality. Stable examination.  2. Moderate subcortical and periventricular white matter hypoattenuation; unchanged and nonspecific, likely reflecting sequela of chronic ischemic microvascular change. Critical result: CODE S exam findings conveyed to physician's assistant Soraya Roman in the ED prior to dictation at 1:45 PM on 7/27/2018. Ct Head Wo Cont    Result Date: 7/23/2018  EXAM: CT head INDICATION: Posterior headache for one week COMPARISON: Noncontrast CT brain 12/7/2015] TECHNIQUE: Axial CT imaging of the head was performed without intravenous contrast. Coronal and sagittal reconstructions were performed. One or more dose reduction techniques were used on this CT: automated exposure control, adjustment of the mAs and/or kVp according to patient's size, and iterative reconstruction techniques. The specific techniques utilized on this CT exam have been documented in the patient's electronic medical record. . _______________ FINDINGS: BRAIN AND POSTERIOR FOSSA: Ventricles are normal in size and surface sulci. There is extensive white matter hypoattenuation which is unchanged from prior exam. There is no intracranial hemorrhage, mass effect, or midline shift. No evidence of acute cortical infarct. EXTRA-AXIAL SPACES AND MENINGES: There are no abnormal extra-axial fluid collections. CALVARIUM: Intact. SINUSES: Clear. OTHER: Visualized orbital structures and mastoid air cells are unremarkable. _______________     IMPRESSION: 1. No evidence of acute infarct, hemorrhage or mass. 2. Stable extensive bilateral white matter hypodensity consistent with microvascular ischemic disease. Xr Chest Port    Result Date: 7/27/2018  Chest, single view Indication: Right-sided weakness with difficulty speaking. Possible stroke. Comparison: Several prior exams, most recently July 23, 2018 Findings:  Portable upright AP view of the chest was obtained. The cardiomediastinal silhouette is within normal limits. The pulmonary vasculature is unremarkable.   Lung parenchyma is well aerated, without focal consolidation. Calcified granuloma right lung base, unchanged. No pleural effusion nor pneumothorax. No acute osseous abnormality. Impression: No radiographic evidence of an acute abnormality. Cta Head Neck W Cont    Result Date: 7/28/2018  EXAM:  CT angiogram of the head and neck with intravenous contrast. COMPARISON: None. INDICATION: Right-sided weakness and speech difficulty. I'm not certain as to the reason that the brain and neck vascular imaging was not performed at the time of MRI which was performed yesterday. TECHNIQUE:  Three-dimensional, maximum intensity projection, and curved planar reformations were post-processed at a dedicated outside facility ([employer]). Stenoses are reported with reference to the downstream lumen (\"NASCET\"). DOSE REDUCTION: One or more dose reduction techniques were used on this CT: automated exposure control, adjustment of the mAs and/or kVp according to patient's size, and iterative reconstruction techniques. The specific techniques utilized on this CT exam have been documented in the patient's electronic medical record. _______________ FINDINGS:      ARTERIAL BOLUS QUALITY:  Diagnostic. AORTIC ARCH:  Normal branching pattern. There is some calcification at the origin of the great vessels but no substantial great vessel stenosis. RIGHT CAROTID ARTERY:  Mild calcified plaque carotid bulb but no diameter stenosis. LEFT CAROTID ARTERY:  No significant stenosis. VERTEBRAL ARTERIES:  Very mildly right dominant. Both vertebral arteries are felt to be patent. The V1 segment of the left vertebral artery is not quite as well seen because of refluxing contrast from the left arm injection into adjacent vertebral veins but the vessel is felt likely to be normal. Remainder left vertebral widely patent. BASILAR AND CEREBRAL ARTERIES: Calcified plaque carotid siphon.  No substantial siphon or supraclinoid internal carotid artery stenosis. M1 segment and proximal M2 segments are widely patent without stenosis or occlusion. A1 segments and anterior communicating artery region are markable for 3 A2 segments, 2 on the left with one of the left A2 segments arising from the distal anterior communicating artery. The anterior communicating artery between this vessel and the left A1 A2 junction is a quite small. No saccular aneurysm. Vertebral basilar system is without aneurysm or stenosis. No distal MCA or BARB stenosis. No other intracranial vascular abnormality. NON-ANGIOGRAPHIC FINDINGS: No apical lung mass. No neck soft tissue abnormality is appreciated. No parenchymal brain abnormality. _______________     IMPRESSION: 1. No hemodynamically significant cervical vascular stenosis. 2. Basically unremarkable brain CTA. No results found for this or any previous visit.         CC: Harjit Castillo MD

## 2018-07-30 NOTE — ROUTINE PROCESS
0700: Bedside and Verbal shift change report given to Lizette Oppenheim  (oncoming nurse) by Barbara Zamora RN   (offgoing nurse).  Report included the following information SBAR, Kardex, MAR, Recent Results and Cardiac Rhythm SR.

## 2018-07-30 NOTE — DIABETES MGMT
NUTRITION / GLYCEMIC CONTROL PLAN OF CARE        Diabetes Management:      - recommend: GC control progressing last 24 hours, recommend monitor for the need to initiate mealtime insulin  - education: (see GC RN)  - goals:    *BG will be in target range of  mg/dl (non-ICU) by 8/8   *PO intake will be 75% of meals offered by 8/8   *Pt will follow up with OP PCP for Diabetes Management 9/30  - TDD: 19 (15 Lantus + 4 units - Humalog Normal Insulin Sensitivity Corrective Coverage)  - BG range: 119-204 mg/dL  - Hypo: no  - BG in target range (non-ICU: <140; ICU<180): [] Yes  [x] No  - Steroids: no  - Intake:    Patient Vitals for the past 100 hrs:   % Diet Eaten   07/30/18 0829 100 %   07/29/18 1900 0 %   07/29/18 0917 10 %   07/28/18 0924 25 %   07/27/18 1900 0 %   07/27/18 1800 0 %     Current Insulin regimen:   Lantus 15 units daily  - Humalog Normal Insulin Sensitivity Corrective Coverage    Home medication/insulin regimen:  Lantus 30 units qam  Amaryl 4 mg daily  Metformin 850 mg TID    Recent Glucose Results: Lab Results   Component Value Date/Time     (H) 07/30/2018 03:14 AM    GLUCPOC 204 (H) 07/30/2018 11:45 AM    GLUCPOC 122 (H) 07/30/2018 06:29 AM    GLUCPOC 165 (H) 07/29/2018 09:20 PM       Adequate glycemic control PTA:  [x] Yes  [] No    HbA1c: equivalent  to ave BGlucose of 189 mg/dl for 2-3 months prior to admission    Lab Results   Component Value Date/Time    Hemoglobin A1c 8.2 (H) 07/28/2018 06:06 AM       Diet:   Active Orders   Diet    DIET CARDIAC Regular       Rai Rachel RN, MS  Glycemic Control Team  Pager [phone] (M-TH 8:30-5P)  *After Hours pager [phone]

## 2018-07-30 NOTE — PROGRESS NOTES
Bedside shift change report given to Ashley Jorge 1829 (oncoming nurse) by Angélica Garcia RN (offgoing nurse). Report included the following information SBAR, Kardex, ED Summary, Procedure Summary, Intake/Output, MAR, Accordion, Recent Results and Med Rec Status. 9737 Assessment completed. A&Ox4. No complaints of chest pain or shortness of breath. Call light within reach. 0915 Pt left floor for modified barium swallow test.     1010 Lost IV access when trying to give Rocephin. PA notified. Okay to hold. 1100 Discharge instructions completed. Pt stated that she will be unable to leave until 1600.    1150 PT stated that while walking, O2 sat dropped to 87%. At rest, O2 sat is 97%. Shift summary: Shift uneventful. Pt rested throughout shift. No complaints of chest pain or shortness of breath. Dual AVS reviewed with Manuel Barrios RN. All medications reviewed individually with patient. Opportunities for questions and concerns provided. Patient discharged via car with . Patient's arm band appropriately discarded.

## 2018-07-30 NOTE — PROGRESS NOTES
Problem: Mobility Impaired (Adult and Pediatric)  Goal: *Acute Goals and Plan of Care (Insert Text)  Physical Therapy Goals  Initiated 7/28/2018 and to be accomplished within 3-7 day(s)  1. Patient will move from supine to sit and sit to supine  in bed with supervision/set-up. 2.  Patient will transfer from bed to chair and chair to bed with supervision/set-up using the least restrictive device. 3.  Patient will perform sit to stand with supervision/set-up. 4.  Patient will ambulate with supervision/set-up for 150 feet with the least restrictive device. 5.  Patient will ascend/descend 16 stairs with B handrail(s) with supervision/set-up. Outcome: Progressing Towards Goal  physical Therapy TREATMENT    Patient: Cata Serna (89 y.o. female)  Date: 7/30/2018  Diagnosis: TIA (transient ischemic attack)  CVA (cerebral vascular accident) ([facility] [address]  Slurred speech  Subtherapeutic anticoagulation Expressive aphasia  Precautions: Fall   Chart, physical therapy assessment, plan of care and goals were reviewed. ASSESSMENT:  Dynamics standing balance with RW improved. No reports of dizziness or HA. SpO2 at rest 97% on RA. Pt amb 100 with RW CGA for safety. SpO2 post amb 87% on RA. VCs for correct breathing technique. Nurse and PA aware. Cont POC. Progression toward goals:  []      Improving appropriately and progressing toward goals  [x]      Improving slowly and progressing toward goals  []      Not making progress toward goals and plan of care will be adjusted     PLAN:  Patient continues to benefit from skilled intervention to address the above impairments. Continue treatment per established plan of care.   Discharge Recommendations:  Home Health  Further Equipment Recommendations for Discharge:  rolling walker     SUBJECTIVE:   Patient stated  My  will be here around 4pm to get me    OBJECTIVE DATA SUMMARY:   Critical Behavior:  Neurologic State: Alert  Orientation Level: Oriented X4  Cognition: Appropriate decision making, Appropriate for age attention/concentration, Appropriate safety awareness, Follows commands  Safety/Judgement: Awareness of environment  Functional Mobility Training:  Bed Mobility:  Scooting: Supervision  Transfers:  Sit to Stand: Supervision  Stand to Sit: Supervision  Bed to Chair: Supervision  Balance:  Sitting: Intact  Standing: Intact; With support  Ambulation/Gait Training:  Distance (ft): 100 Feet (ft)  Assistive Device: Walker, rolling;Gait belt  Ambulation - Level of Assistance: Contact guard assistance;Stand-by assistance  Gait Abnormalities: Decreased step clearance  Stance: Right decreased  Speed/Xochilt: Slow  Step Length: Right shortened;Left shortened      Pain:  Pain Scale 1: Numeric (0 - 10)  Pain Intensity 1: 0  Activity Tolerance:   Fair    After treatment:   [] Patient left in no apparent distress sitting up in chair  [x] Patient left in no apparent distress in bed(EOB)   [x] Call bell left within reach  [x] Nursing notified  [] Caregiver present  [] Bed alarm activated      Mary Rosales PTA   Time Calculation: 16 mins

## 2018-07-30 NOTE — PROGRESS NOTES
Problem: Self Care Deficits Care Plan (Adult)  Goal: *Acute Goals and Plan of Care (Insert Text)  Initial OT STGs (7/30/2018) Within 7 days:    1. Patient will perform toilet transfer with Supervision in preparation for bowel and bladder management. 2. Patient will perform bowel and bladder management with Supervision for increased independence with ADLs. 3. Patient will perform UB dressing with setup while utilizing gloria-techniques for increased independence with ADLs. 4. Patient will perform LB dressing with setup while utilizing gloria-techniques for increased independence with ADLs. 5. Patient will perform grooming standing sinkside with Superversion for 2-3 minutes for ADLs. Outcome: Resolved/Met Date Met: 07/30/18  Occupational Therapy EVALUATION/discharge    Patient: Minh Oropeza (63 y.o. female)  Date: 7/30/2018  Primary Diagnosis: TIA (transient ischemic attack)  CVA (cerebral vascular accident) ([facility] [address]  Slurred speech  Subtherapeutic anticoagulation        Precautions:  Fall    ASSESSMENT AND RECOMMENDATIONS:  Based on the objective data described below, the patient presents with ability to perform ADL tasks at baseline level. Patient able to perform LE socks utilizing ankle-over-knee and increased time for RLE d/t TKA. Pt able to perform toileting including hygiene at Supervision. Pt with mild visual deficit on scanning, but appears functional as pt able to locate correct ADL items in bathroom. Pt with no further OT/ADL concerns at this time. Education: Reviewed home safety, body mechanics, signs and symptoms of a stroke, risk factors, blood sugar management in relation to stroke, and adaptive dressing techniques with patient verbalizing understanding at this time. Stroke Education: Patient educated on signs/symptoms of stroke and importance of early intervention. Patient verbalized understanding. Discharge Recommendations: Home Health  Further Equipment Recommendations for Discharge:  To Be Determined (TBD) at next level of care       SUBJECTIVE:   Patient stated It takes me a little time.  (re: LE dressing for RLE)    OBJECTIVE DATA SUMMARY:     Past Medical History:   Diagnosis Date    Asthma     Chronic obstructive pulmonary disease (HCC)     Diabetes ([facility] [address]     HTN (hypertension), benign     Hyperlipidemia     Menopause     Myocardial infarction ([facility] [address]     Neuropathy     Sciatica     Stroke [facility])      Past Surgical History:   Procedure Laterality Date    FOOT/TOES SURGERY PROC UNLISTED      HX CARPAL TUNNEL RELEASE      HX HEMORRHOIDECTOMY      HX HYSTERECTOMY      Age [address]    HX KNEE REPLACEMENT       Barriers to Learning/Limitations: yes;  language  Compensate with: visual, verbal, tactile, kinesthetic cues/model    Prior Level of Function/Home Situation: Pt w/ supportive family  Home Situation  Home Environment: Private residence  # Steps to Enter: 0  One/Two Story Residence: Two story  # of Interior Steps: 12  Interior Rails: Both  Lift Chair Available: No  Living Alone: No  Support Systems: Spouse/Significant Other/Partner  Patient Expects to be Discharged to[de-identified] Private residence  [x]     Right hand dominant   []     Left hand dominant    Cognitive/Behavioral Status:  Neurologic State: Alert  Orientation Level: Oriented X4  Cognition: Appropriate decision making; Appropriate for age attention/concentration; Appropriate safety awareness; Follows commands  Safety/Judgement: Awareness of environment    Skin: appears intact  Edema: no edema noted    Vision/Perceptual:    Tracking: Requires cues, head turns, or add eye shifts to track    Convergence: Breaks at 8 from nose    Diplopia: No    Acuity: Impaired near vision    Corrective Lenses: Reading glasses    Coordination:  Coordination: Generally decreased, functional  Fine Motor Skills-Upper: Left Intact; Right Intact    Gross Motor Skills-Upper: Left Intact; Right Intact    Balance:  Sitting: Intact  Standing: Intact; With support    Strength:  Strength: Generally decreased, functional  Tone & Sensation:  Tone: Normal  Sensation: Impaired  Range of Motion:  AROM: Generally decreased, functional  PROM: Generally decreased, functional    Functional Mobility and Transfers for ADLs:  Bed Mobility:  Scooting: Supervision  Transfers:  Sit to Stand: Supervision  Bed to Chair: Supervision   Toilet Transfer : Supervision   Bathroom Mobility: Supervision/set up    ADL Assessment:  Feeding: Setup    Oral Facial Hygiene/Grooming: Supervision    Bathing: Setup; Additional time    Upper Body Dressing: Setup    Lower Body Dressing: Setup    Toileting: Supervision    ADL Intervention:  Grooming  Washing Hands: Supervision/set-up    Lower Body Dressing Assistance  Socks: Supervision/set-up  Position Performed: Seated edge of bed (ankle-over-knee)    Toileting  Toileting Assistance: Supervision/set up  Bladder Hygiene: Supervision/set-up  Clothing Management: Supervision/set-up  Adaptive Equipment: Grab bars; Walker    Cognitive Retraining  Problem Solving: Inductive reason; Identifying the task; Identifying the problem;General alternative solution;Deductive reason  Executive Functions: Executing cognitive plans  Organizing/Sequencing: Breaking task down;Prioritizing  Safety/Judgement: Awareness of environment    Pain:  Pre-treatment: 0/10  Post-treatment: 0/10    Activity Tolerance:   Patient able to stand 2-3 minute(s). Patient able to complete ADLs with intermittent rest breaks. Please refer to the flowsheet for vital signs taken during this treatment.   After treatment:   []  Patient left in no apparent distress sitting up in chair  [x]  Patient left in no apparent distress in bed  [x]  Call bell left within reach  [x]  Nursing notified  [x]  Caregiver present/Shu COLLINS, notifying pt she is going for MBS  []  Bed alarm activated    COMMUNICATION/EDUCATION:   Communication/Collaboration:  [x]      Home safety education was provided and the patient/caregiver indicated understanding. [x]      Patient/family have participated as able and agree with findings and recommendations. []      Patient is unable to participate in plan of care at this time. Thank you for this referral.  Zuleyka Bone, OTR/L  Time Calculation: 23 mins    G-Codes (GP)  Self Care   Current  CI= 1-19%   Goal  CI= 1-19%   D/C  CI= 1-19%  The severity rating is based on the professional judgement & direct observation of Level of Assistance required for Functional Mobility and ADLs. Eval Complexity: History: HIGH Complexity : Extensive review of history including physical, cognitive and psychosocial history ; Examination: LOW Complexity : 1-3 performance deficits relating to physical, cognitive , or psychosocial skils that result in activity limitations and / or participation restrictions ; Decision Making:MEDIUM Complexity : Patient may present with comorbidities that affect occupational performnce.  Miniml to moderate modification of tasks or assistance (eg, physical or verbal ) with assesment(s) is necessary to enable patient to complete evaluation

## 2018-07-30 NOTE — DIABETES MGMT
Diabetes Patient/Family Education Record    Factors That  May Influence Patients Ability  to Learn or  Comply with Recommendations   []   Language barrier    []   Cultural needs   []   Motivation    []   Cognitive limitation    []   Physical   []   Education    []   Physiological factors   []   Hearing/vision/speaking impairment   []   Religion beliefs    []   Financial factors   []  Other:   [x]  No factors identified at this time.      Person Instructed:   [x]   Patient   []   Family   []  Other     Preference for Learning:   [x]   Verbal   []   Written   []  Demonstration     Level of Comprehension & Competence:    []  Good                                      [x] Fair                                     []  Poor                             []  Needs Reinforcement   []  Teachback completed    Education Component:   [x]  Medication management, including confirmation of home regimen    []  Nutritional management    []  Exercise   []  Signs, symptoms, and treatment of hyperglycemia and hypoglycemia   [] Prevention, recognition and treatment of hyperglycemia and hypoglycemia   [x]  Importance of blood glucose monitoring     []  Instruction on use of the blood glucose meter   [x]  Discuss the importance of HbA1C monitoring    []  Sick day guidelines   []  Proper use and disposal of lancets, needles, syringes or insulin pens (if appropriate)   []  Potential long-term complications (retinopathy, kidney disease, neuropathy, foot care)   [] Information about whom to contact in case of emergency or for more information    [x]  Goal:  Patient/family will demonstrate understanding of Diabetes Self Management Skills by: 9/30  Plan for post-discharge education or self-management support:    [] Outpatient class schedule provided            [] Patient Declined    [] Scheduled for outpatient classes (date) _______     Carla Moore RN, MS  Glycemic Control Team  Pager [phone] (M-TH 8:30-5P)  *After Hours pager [phone]

## 2018-07-30 NOTE — PROGRESS NOTES
NEUROLOGY PROGRESS NOTE        Patient: Flynn Penny        Sex: female          DOA: 7/27/2018  YOB: 1945      Age:  71 y.o.         LOS: 3 days     Identification:  71 y. o. female with history of CVA, HTN, HLD, DM, COPD and DVTs (on Coumadin), who presented with acute onset of right sided weakness and speech difficulty.      SUBJECTIVE:   The headaches are better with Depakote and Fioricet as needed. No new weakness or numbness reported.     Stroke Work-up:  Brain MRI: 1. No acute hemorrhage or acute infarction. 2. White matter abnormality is nonspecific but likely ischemic. This is stable. Basal ganglia and thalamic lesions likely primarily perivascular CSF spaces but can't exclude tiny chronic lacunar infarctions. 3. No other significant intracranial abnormality or change. CTA of head and neck: 1. No hemodynamically significant cervical vascular stenosis. 2. Basically unremarkable brain CTA. Echocardiogram:   · Saline contrast was given to evaluate for intracardiac shunt. no shunt seen  · Definity contrast was given to enhance imaging. · Left ventricular hyperdynamic systolic function. Estimated left ventricular ejection fraction is >70%. Left ventricular moderate concentric hypertrophy observed. Normal left ventricular wall motion, no regional wall motion abnormality noted. Moderate (grade 2) left ventricular diastolic dysfunction. · Aortic valve is trileaflet.  Aortic valve sclerosis with no significant stenosis.    Lipid panel:         Lab Results   Component Value Date/Time     Cholesterol, total 127 07/28/2018 06:06 AM     HDL Cholesterol 35 (L) 07/28/2018 06:06 AM     LDL, calculated 61.6 07/28/2018 06:06 AM     VLDL, calculated 30.4 07/28/2018 06:06 AM     Triglyceride 152 (H) 07/28/2018 06:06 AM     CHOL/HDL Ratio 3.6 07/28/2018 06:06 AM      HbA1c:         Lab Results   Component Value Date/Time     Hemoglobin A1c 8.2 (H) 07/28/2018 06:06 AM     REVIEW OF SYSTEMS: Denies chest pain, abdominal pain, nausea or vomiting. No fever or chills. OBJECTIVE:      Visit Vitals    /50 (BP 1 Location: Right arm, BP Patient Position: At rest)    Pulse 61    Temp 97.6 °F (36.4 °C)    Resp 18    Ht 5' 4\" (1.626 m)    Wt 90.7 kg (200 lb)    SpO2 98%    BMI 34.33 kg/m2     Physical Exam:  GEN: Alert, NAD  EYES: conjunctiva normal, lids with out lesions  HEENT: MMM. HEART: RRR +S1 +S2  LUNGS: CTA B/L no rales or rhonchi. ABDOMEN: Soft, non-tender. EXTREMITIES: No edema cyanosis  SKIN: no rashes or skin breakdown, no nodules  NEURO: Alert, oriented x3. Speech is fluent. Cranials: Face is symmetric. Smiles symmetrically. EOMI, VFF. Tongue is midline. Motor: Full strength at all sites. No pronator drift. Sensory: Intact to crude touch on all four. Coordination: Intact with no dysmetria during FNF.      Current Facility-Administered Medications   Medication Dose Route Frequency    losartan/hydroCHLOROthiazide (HYZAAR) 100/12.5 mg   Oral DAILY    gabapentin (NEURONTIN) capsule 300 mg  300 mg Oral DAILY    gabapentin (NEURONTIN) capsule 600 mg  600 mg Oral QPM    nortriptyline (PAMELOR) capsule 100 mg  100 mg Oral QHS    atenolol (TENORMIN) tablet 200 mg  200 mg Oral DAILY    divalproex ER (DEPAKOTE ER) 24 hour tablet 750 mg  750 mg Oral QHS    butalbital-acetaminophen-caffeine (FIORICET, ESGIC) -40 mg per tablet 1 Tab  1 Tab Oral Q6H PRN    cefTRIAXone (ROCEPHIN) 1 g in sterile water (preservative free) 10 mL IV syringe  1 g IntraVENous Q24H    insulin glargine (LANTUS) injection 15 Units  15 Units SubCUTAneous QHS    Warfarin - Pharmacy to Dose  1 Each Other Rx Dosing/Monitoring    aspirin chewable tablet 81 mg  81 mg Oral DAILY    umeclidinium (INCRUSE ELLIPTA) 62.5 mcg/actuation  1 Puff Inhalation DAILY    sodium chloride (NS) flush 5-10 mL  5-10 mL IntraVENous Q8H    sodium chloride (NS) flush 5-10 mL  5-10 mL IntraVENous PRN    insulin lispro (HUMALOG) injection SubCUTAneous AC&HS    glucose chewable tablet 16 g  4 Tab Oral PRN    glucagon (GLUCAGEN) injection 1 mg  1 mg IntraMUSCular PRN    dextrose (D50W) injection syrg 12.5-25 g  25-50 mL IntraVENous PRN    ondansetron (ZOFRAN) injection 4 mg  4 mg IntraVENous Q6H PRN    labetalol (NORMODYNE;TRANDATE) injection 5 mg  5 mg IntraVENous Q10MIN PRN    famotidine (PEPCID) tablet 20 mg  20 mg Oral BID    atorvastatin (LIPITOR) tablet 40 mg  40 mg Oral QHS    enoxaparin (LOVENOX) injection 40 mg  40 mg SubCUTAneous Q24H       Laboratory  Recent Results (from the past 24 hour(s))   ECHO ADULT COMPLETE    Collection Time: 07/29/18  9:54 AM   Result Value Ref Range    LA Volume 61.04 22 - 52 mL    Right Atrial Area 4C 13.70 cm2    Ao Root D 3.06 cm    AO ASC D 3.26 cm    AO ARCH D 2.85 cm    Aortic Valve Systolic Peak Velocity 626.53 cm/s    AoV VTI 28.37 cm    Aortic Valve Area by Continuity of Peak Velocity 3.4 cm2    Aortic Valve Area by Continuity of VTI 3.2 cm2    AoV PG 7.4 mmHg    LVIDd 3.79 (A) 3.9 - 5.3 cm    LVPWd 1.43 (A) 0.6 - 0.9 cm    LVIDs 2.48 cm    IVSd 1.39 (A) 0.6 - 0.9 cm    LV ED Vol A2C 80.8 mL    LV ES Vol A4C 18.4 mL    LV ES Vol BP 18.5 (A) 19 - 49 mL    LVOT d 2.03 cm    LVOT Peak Velocity 141.81 cm/s    LVOT Peak Gradient 8.0 mmHg    LVOT VTI 28.40 cm    LV E' Septal Velocity 0.05 cm/s    LV E' Lateral Velocity 0.08 cm/s    MVA (PHT) 4.1 cm2    MV A Jacobo 106.33 cm/s    MV E Jacobo 1.17 cm/s    MV E/A 0.01     RVIDd 3.80 cm    Aortic Valve Systolic Mean Gradient 4.9 mmHg    BP EF 77.5 55 - 100 %    LV Ejection Fraction MOD 4C 78 %    LV Ejection Fraction MOD 2C 77 %    LA Vol 4C 51.42 22 - 52 mL    LA Vol 2C 60.32 (A) 22 - 52 mL    LV Mass .8 (A) 67 - 162 g    LV Mass AL Index 118.0 (A) g/m2    E/E' lateral 14.63     E/E' septal 23.40     E/E' ratio (averaged) 19.01     LV ES Vol A2C 18.2 mL    LVES Vol Index BP 9.5 mL/m2    LV ED Vol A4C 83.1 mL    LVED Vol Index BP 42.1 mL/m2    Mitral Valve E Wave Deceleration Time 183.6 ms    Mitral Valve Pressure Half-time 53.2 ms    Left Atrium Major Axis 3.67 cm    Tapse 3.40 1.5 - 2.0 cm    LV ED Vol BP 82.3 56 - 104 ml    LA Vol Index 31.21 ml/m2    LA Vol Index 30.84 ml/m2    LA Vol Index 26.29 ml/m2    LVED Vol Index A4C 42.5 mL/m2    LVED Vol Index A2C 41.3 mL/m2    LVES Vol Index A4C 9.4 mL/m2    LVES Vol Index A2C 9.3 mL/m2    CORINNE/BSA Pk Jacobo 1.7 cm2/m2    CORINNE/BSA VTI 1.6 cm2/m2    AV Cusp 2.03 cm   GLUCOSE, POC    Collection Time: 07/29/18 11:58 AM   Result Value Ref Range    Glucose (POC) 133 (H) 70 - 110 mg/dL   GLUCOSE, POC    Collection Time: 07/29/18  4:44 PM   Result Value Ref Range    Glucose (POC) 129 (H) 70 - 110 mg/dL   GLUCOSE, POC    Collection Time: 07/29/18  9:20 PM   Result Value Ref Range    Glucose (POC) 165 (H) 70 - 110 mg/dL   CBC W/O DIFF    Collection Time: 07/30/18  3:14 AM   Result Value Ref Range    WBC 4.9 4.6 - 13.2 K/uL    RBC 3.99 (L) 4.20 - 5.30 M/uL    HGB 11.1 (L) 12.0 - 16.0 g/dL    HCT 35.2 35.0 - 45.0 %    MCV 88.2 74.0 - 97.0 FL    MCH 27.8 24.0 - 34.0 PG    MCHC 31.5 31.0 - 37.0 g/dL    RDW 16.2 (H) 11.6 - 14.5 %    PLATELET 556 090 - 793 K/uL    MPV 9.9 9.2 - 09.1 FL   METABOLIC PANEL, BASIC    Collection Time: 07/30/18  3:14 AM   Result Value Ref Range    Sodium 143 136 - 145 mmol/L    Potassium 3.6 3.5 - 5.5 mmol/L    Chloride 105 100 - 108 mmol/L    CO2 32 21 - 32 mmol/L    Anion gap 6 3.0 - 18 mmol/L    Glucose 119 (H) 74 - 99 mg/dL    BUN 8 7.0 - 18 MG/DL    Creatinine 0.91 0.6 - 1.3 MG/DL    BUN/Creatinine ratio 9 (L) 12 - 20      GFR est AA >60 >60 ml/min/1.73m2    GFR est non-AA >60 >60 ml/min/1.73m2    Calcium 9.1 8.5 - 10.1 MG/DL   PROTHROMBIN TIME + INR    Collection Time: 07/30/18  3:14 AM   Result Value Ref Range    Prothrombin time 17.4 (H) 11.5 - 15.2 sec    INR 1.4 (H) 0.8 - 1.2     GLUCOSE, POC    Collection Time: 07/30/18  6:29 AM   Result Value Ref Range    Glucose (POC) 122 (H) 70 - 110 mg/dL ASSESSMENT/IMPRESSION:     ASSESSMENT/IMPRESSION:   Possibly TIA, but given the presence of headaches with nausea and nothing to support a vascular etiology, I feel her presentation is most suggestive of migraine headaches with atypical features. RECOMMENDATIONS:  1. Continue aspirin 81mg and Atorvastatin 40mg daily  2. Treatment of UTI per primary team  3. Butalbital combination tabs for headache as needed. Depakote ER 500mg BID  4. Continue Coumadin with goal INR 2 to 3.  5. Neuro checks per floor protocol    Ok to d/c patient home. Neurology signs off. I will follow the patient as outpatient. Please do not hesitate to return with any questions.     Signed:  Rand Conley MD  7/30/2018  7:28 AM

## 2018-07-30 NOTE — PROGRESS NOTES
1900: Received report from Althea Darnell. White board updated. Pt is alert and oriented x4. NIH score 0. Family at bedside. Pt currently does not report any pain or respiratory distress. Pt's questions and concerns addressed at this time. Will continue to monitor. 2257: PRN Fioricet given for HA.     0000: Reassessment completed, no changes compared to initial assessment, pt does not report any pain or discomfort at this time. Bed in lowest position, and call bell within reach. Will continue to monitor. 0400: Pt sleeping comfortably, will continue to round hourly on pt.

## 2018-07-30 NOTE — PROGRESS NOTES
Transition of Care (DEO) Plan:  Pt for discharge home, declines HH, agreeable to [facility] visit for both CVA and COPD; referral placed with CMS. Pt ha family at home to assist as needed. No other needs at this time, pt has DME for home. DEO Transportation:       How is patient being transported at discharge? By family     When? Before 12pm     Is transport scheduled? Follow-up appointment and transportation:     PCP? Specialist?    Per AVS     Time/Date? Who is transporting to the follow-up appointment? Is transport for follow up appointment scheduled? Communication plan (with patient/family): Who is being called? Patient or Next of Kin? Responsible party? What number(s) is to be used? What service provider is calling for [facility] services? When are they calling? Readmission Risk? (Green/Low; Yellow/Moderate; Red/High):  green      Care Management Interventions  PCP Verified by CM:  Yes  Transition of Care Consult (CM Consult): [address]: Yes  Physical Therapy Consult: Yes  Occupational Therapy Consult: Yes  Speech Therapy Consult: Yes  Current Support Network: Lives with Spouse  Confirm Follow Up Transport: Family  Plan discussed with Pt/Family/Caregiver: Yes  Freedom of Choice Offered: Yes  Discharge Location  Discharge Placement: Home with home health [facility])

## 2018-07-30 NOTE — PROGRESS NOTES
Problem: Falls - Risk of  Goal: *Absence of Falls  Document Satinder Fall Risk and appropriate interventions in the flowsheet.    Outcome: Progressing Towards Goal  Fall Risk Interventions:  Mobility Interventions: Assess mobility with egress test, Bed/chair exit alarm, Communicate number of staff needed for ambulation/transfer, Utilize walker, cane, or other assistive device         Medication Interventions: Patient to call before getting OOB, Teach patient to arise slowly, Bed/chair exit alarm    Elimination Interventions: Bed/chair exit alarm, Call light in reach, Patient to call for help with toileting needs, Toilet paper/wipes in reach    History of Falls Interventions: Bed/chair exit alarm, Investigate reason for fall, Room close to nurse's station, Door open when patient unattended

## 2018-07-30 NOTE — PROGRESS NOTES
Problem: Dysphagia (Adult)  Goal: *Acute Goals and Plan of Care (Insert Text)  Recommendations:  Diet: regular, thin   Meds: per pt preference  Aspiration Precautions  Oral Care TID    Goals:  Patient will:  1. Tolerate PO trials with 0 s/s overt distress in 4/5 trials  2. Utilize compensatory swallow strategies/maneuvers (decrease bite/sip, size/rate, alt. liq/sol) with min cues in 4/5 trials  3. Complete an objective swallow study (i.e., MBSS) to assess swallow integrity, r/o aspiration, and determine of safest LRD, min A - goal met 7/30     Outcome: Progressing Towards Goal  Speech Pathology Modified barium swallow Study  And follow-up treatment    Patient: Ksenia Owens (63 y.o. female)  Date: 7/30/2018  Primary Diagnosis: TIA (transient ischemic attack)  CVA (cerebral vascular accident) ([facility] [address]  Slurred speech  Subtherapeutic anticoagulation        Precautions: Aspiration  Fall  PLOF: Living with family  ASSESSMENT :  Modified Barium Swallow completed with 0 aspiration evident across all study trials, to include: thin liquids +/- straw; pudding; cracker; and 13 mm barium pill with thin liquid wash. Pt demo: (+) bolus cohesion, manipulation, and propulsion; (+) swallow reflex; and (+) hyolaryngeal excursion. 0 oropharyngeal dysphagia evident at this time. Pt presents with intact swallow function, as evidenced above. At this time, safe for regular solid, thin liquid diet with continued aspiration precautions. Follow up treatment completed as follows: SLP utilized video of study for visual feedback, education, and recommendations for pt. Educated pt on aspiration precautions and importance of compensatory swallow techniques to decrease aspiration risk (decrease rate of intake & sip/bite size, upright @HOB for all po intake and ~30 minutes after po); verbalized comprehension. SLP will follow up 1-2x to assess diet tolerance and education.      Of note, pt reports concern of stuttering, however speech appears free of atypical dysfluency in words, phrases, sentences and conversation. No change in fluency noted by family member at bedside compared to baseline speech. Patients rehabilitation potential is considered to be Good  Factors which may influence rehabilitation potential include:   [x]              None noted  []              Mental ability/status  []              Medical condition  []              Home/family situation and support systems  []              Safety awareness  []              Pain tolerance/management  []              Other:      PLAN :  Recommendations and Planned Interventions:  Regular/thin liquid   Frequency/Duration: Patient will be followed by speech-language pathology 1-2 times to address goals. Discharge Recommendations: None     SUBJECTIVE:   Patient stated I'm glad the results are normal.     OBJECTIVE:     Past Medical History:   Diagnosis Date    Asthma     [facility] obstructive pulmonary disease ([facility] [address]     Diabetes ([facility] [address]     HTN (hypertension), benign     Hyperlipidemia     Menopause     Myocardial infarction (HCC)     Neuropathy     Sciatica     Stroke [facility])      Past Surgical History:   Procedure Laterality Date    FOOT/TOES SURGERY PROC UNLISTED      HX CARPAL TUNNEL RELEASE      HX HEMORRHOIDECTOMY      HX HYSTERECTOMY      Age 40    HX KNEE REPLACEMENT       Prior Level of Function/Home Situation: Living with family  Home Situation  Home Environment: Private residence  # Steps to Enter: 0  One/Two Story Residence: Two story  # of Interior Steps: 12  Interior Rails: Both  Lift Chair Available: No  Living Alone: No  Support Systems: Spouse/Significant Other/Partner  Patient Expects to be Discharged to[de-identified] Private residence  Diet prior to admission: Regular/thin  Current Diet:  Regular/thin   Radiologist: [facility] Views: Lateral  Patient Position: Chair 90    Decreased Tongue Base Retraction?: No  Laryngeal Elevation: WFL (within functional limits)  Aspiration/Penetration Score: 1 (No penetration or aspiration-Contrast does not enter the airway)  Pharyngeal Symmetry: Not assessed  Pharyngeal-Esophageal Segment: No impairment  Pharyngeal Dysfunction: None    Oral Phase Severity: No impairment  Pharyngeal Phase Severity: N/A    GCODESwallowing:  Swallow Current Status CI= 1-19%   Swallow Goal Status CH= 0%    The severity rating is based on the following outcomes:  CHARLEY Noms Swallow Level 6    8-point Penetration-Aspiration Scale: Score 1  Clinical Judgement    PAIN:  Start of Study: 0  End of Study: 0     COMMUNICATION/EDUCATION:   [x]  Aspiration risks/precautions; compensatory swallow techniques  [x]  Patient/family have participated as able in goal setting and plan of care. [x]  Patient/family agree to work toward stated goals and plan of care. []  Patient understands intent and goals of therapy, but is neutral about his/her participation. []  Patient is unable to participate in goal setting and plan of care.     Thank you for this referral.  LETICIA Elaine  Time Calculation: 40 mins  MBS Time: 30 minutes  Treatment Time: 10 minutes

## 2018-07-30 NOTE — PROGRESS NOTES
Speech Therapy Note:    Modified barium swallow study completed without aspiration or penetration across all consistency trials. Swallow function intact. Patient safe for regular solid, thin liquid diet. Full report to follow.      Canelo Cason M.S., CF-SLP  Speech Language Pathologist

## 2018-07-31 ENCOUNTER — HOME CARE VISIT (OUTPATIENT)
Dept: HOME HEALTH SERVICES | Facility: HOME HEALTH | Age: 73
End: 2018-07-31

## 2018-08-01 ENCOUNTER — HOME CARE VISIT (OUTPATIENT)
Dept: HOME HEALTH SERVICES | Facility: HOME HEALTH | Age: 73
End: 2018-08-01

## 2018-08-03 ENCOUNTER — HOME CARE VISIT (OUTPATIENT)
Dept: SCHEDULING | Facility: HOME HEALTH | Age: 73
End: 2018-08-03

## 2018-08-03 LAB
BACTERIA SPEC CULT: NORMAL
BACTERIA SPEC CULT: NORMAL
SERVICE CMNT-IMP: NORMAL
SERVICE CMNT-IMP: NORMAL

## 2018-08-03 PROCEDURE — G0299 HHS/HOSPICE OF RN EA 15 MIN: HCPCS

## 2018-10-12 RX ORDER — OXCARBAZEPINE 300 MG/1
300 TABLET, FILM COATED ORAL 2 TIMES DAILY
COMMUNITY
End: 2021-08-12

## 2018-10-15 ENCOUNTER — HOSPITAL ENCOUNTER (OUTPATIENT)
Age: 73
Setting detail: OUTPATIENT SURGERY
Discharge: HOME | End: 2018-10-15
Attending: INTERNAL MEDICINE | Admitting: INTERNAL MEDICINE
Payer: MEDICARE

## 2018-10-15 VITALS
DIASTOLIC BLOOD PRESSURE: 64 MMHG | BODY MASS INDEX: 36.5 KG/M2 | HEIGHT: 64 IN | SYSTOLIC BLOOD PRESSURE: 146 MMHG | WEIGHT: 213.8 LBS | HEART RATE: 76 BPM | TEMPERATURE: 97.7 F | RESPIRATION RATE: 17 BRPM | OXYGEN SATURATION: 97 %

## 2018-10-15 DIAGNOSIS — R07.9 CHEST PAIN: ICD-10-CM

## 2018-10-15 LAB
ANION GAP SERPL CALC-SCNC: 11 MMOL/L (ref 3–18)
BUN SERPL-MCNC: 15 MG/DL (ref 7–18)
BUN/CREAT SERPL: 16 (ref 12–20)
CALCIUM SERPL-MCNC: 8.6 MG/DL (ref 8.5–10.1)
CHLORIDE SERPL-SCNC: 99 MMOL/L (ref 100–108)
CO2 SERPL-SCNC: 27 MMOL/L (ref 21–32)
CREAT SERPL-MCNC: 0.92 MG/DL (ref 0.6–1.3)
ERYTHROCYTE [DISTWIDTH] IN BLOOD BY AUTOMATED COUNT: 17.1 % (ref 11.6–14.5)
GLUCOSE SERPL-MCNC: 157 MG/DL (ref 74–99)
HCT VFR BLD AUTO: 33.3 % (ref 35–45)
HGB BLD-MCNC: 10.9 G/DL (ref 12–16)
INR PPP: 0.9 (ref 0.8–1.2)
MCH RBC QN AUTO: 27.6 PG (ref 24–34)
MCHC RBC AUTO-ENTMCNC: 32.7 G/DL (ref 31–37)
MCV RBC AUTO: 84.3 FL (ref 74–97)
PLATELET # BLD AUTO: 333 K/UL (ref 135–420)
PMV BLD AUTO: 10 FL (ref 9.2–11.8)
POTASSIUM SERPL-SCNC: 3.6 MMOL/L (ref 3.5–5.5)
PROTHROMBIN TIME: 12.2 SEC (ref 11.5–15.2)
RBC # BLD AUTO: 3.95 M/UL (ref 4.2–5.3)
SODIUM SERPL-SCNC: 137 MMOL/L (ref 136–145)
WBC # BLD AUTO: 5.4 K/UL (ref 4.6–13.2)

## 2018-10-15 PROCEDURE — 74011250636 HC RX REV CODE- 250/636: Performed by: INTERNAL MEDICINE

## 2018-10-15 PROCEDURE — 74011636320 HC RX REV CODE- 636/320: Performed by: INTERNAL MEDICINE

## 2018-10-15 PROCEDURE — C1769 GUIDE WIRE: HCPCS | Performed by: INTERNAL MEDICINE

## 2018-10-15 PROCEDURE — 77030013797 HC KT TRNSDUC PRSSR EDWD -A: Performed by: INTERNAL MEDICINE

## 2018-10-15 PROCEDURE — C1751 CATH, INF, PER/CENT/MIDLINE: HCPCS | Performed by: INTERNAL MEDICINE

## 2018-10-15 PROCEDURE — 74011250637 HC RX REV CODE- 250/637: Performed by: INTERNAL MEDICINE

## 2018-10-15 PROCEDURE — 99153 MOD SED SAME PHYS/QHP EA: CPT | Performed by: INTERNAL MEDICINE

## 2018-10-15 PROCEDURE — 85610 PROTHROMBIN TIME: CPT | Performed by: INTERNAL MEDICINE

## 2018-10-15 PROCEDURE — 77030004558 HC CATH ANGI DX SUPR TORQ CARD -A: Performed by: INTERNAL MEDICINE

## 2018-10-15 PROCEDURE — 80048 BASIC METABOLIC PNL TOTAL CA: CPT | Performed by: INTERNAL MEDICINE

## 2018-10-15 PROCEDURE — 93460 R&L HRT ART/VENTRICLE ANGIO: CPT | Performed by: INTERNAL MEDICINE

## 2018-10-15 PROCEDURE — 77030029997 HC DEV COM RDL R BND TELE -B: Performed by: INTERNAL MEDICINE

## 2018-10-15 PROCEDURE — C1894 INTRO/SHEATH, NON-LASER: HCPCS | Performed by: INTERNAL MEDICINE

## 2018-10-15 PROCEDURE — 77030028837 HC SYR ANGI PWR INJ COEU -A: Performed by: INTERNAL MEDICINE

## 2018-10-15 PROCEDURE — 99152 MOD SED SAME PHYS/QHP 5/>YRS: CPT | Performed by: INTERNAL MEDICINE

## 2018-10-15 PROCEDURE — 77030004522 HC CATH ANGI DX EXPO BSC -A: Performed by: INTERNAL MEDICINE

## 2018-10-15 PROCEDURE — 74011250637 HC RX REV CODE- 250/637

## 2018-10-15 PROCEDURE — 77030008543 HC TBNG MON PRSS MRTM -A: Performed by: INTERNAL MEDICINE

## 2018-10-15 PROCEDURE — 77030004521 HC CATH ANGI DX COOK -B: Performed by: INTERNAL MEDICINE

## 2018-10-15 PROCEDURE — 85027 COMPLETE CBC AUTOMATED: CPT | Performed by: INTERNAL MEDICINE

## 2018-10-15 PROCEDURE — 74011250636 HC RX REV CODE- 250/636

## 2018-10-15 RX ORDER — HEPARIN SODIUM 200 [USP'U]/100ML
INJECTION, SOLUTION INTRAVENOUS
Status: DISCONTINUED | OUTPATIENT
Start: 2018-10-15 | End: 2018-10-15 | Stop reason: HOSPADM

## 2018-10-15 RX ORDER — SODIUM CHLORIDE 9 MG/ML
25 INJECTION, SOLUTION INTRAVENOUS CONTINUOUS
Status: DISCONTINUED | OUTPATIENT
Start: 2018-10-15 | End: 2018-10-15 | Stop reason: HOSPADM

## 2018-10-15 RX ORDER — METFORMIN HYDROCHLORIDE 850 MG/1
850 TABLET ORAL 3 TIMES DAILY
Qty: 30 TAB | Refills: 0 | Status: SHIPPED | OUTPATIENT
Start: 2018-10-17

## 2018-10-15 RX ORDER — FENTANYL CITRATE 50 UG/ML
INJECTION, SOLUTION INTRAMUSCULAR; INTRAVENOUS AS NEEDED
Status: DISCONTINUED | OUTPATIENT
Start: 2018-10-15 | End: 2018-10-15 | Stop reason: HOSPADM

## 2018-10-15 RX ORDER — SODIUM CHLORIDE 0.9 % (FLUSH) 0.9 %
5-10 SYRINGE (ML) INJECTION EVERY 8 HOURS
Status: DISCONTINUED | OUTPATIENT
Start: 2018-10-15 | End: 2018-10-15 | Stop reason: HOSPADM

## 2018-10-15 RX ORDER — LORAZEPAM 1 MG/1
1 TABLET ORAL
Status: COMPLETED | OUTPATIENT
Start: 2018-10-15 | End: 2018-10-15

## 2018-10-15 RX ORDER — SODIUM CHLORIDE 0.9 % (FLUSH) 0.9 %
5-10 SYRINGE (ML) INJECTION AS NEEDED
Status: DISCONTINUED | OUTPATIENT
Start: 2018-10-15 | End: 2018-10-15 | Stop reason: HOSPADM

## 2018-10-15 RX ORDER — MIDAZOLAM HYDROCHLORIDE 1 MG/ML
INJECTION, SOLUTION INTRAMUSCULAR; INTRAVENOUS AS NEEDED
Status: DISCONTINUED | OUTPATIENT
Start: 2018-10-15 | End: 2018-10-15 | Stop reason: HOSPADM

## 2018-10-15 RX ORDER — HEPARIN SODIUM 1000 [USP'U]/ML
INJECTION, SOLUTION INTRAVENOUS; SUBCUTANEOUS AS NEEDED
Status: DISCONTINUED | OUTPATIENT
Start: 2018-10-15 | End: 2018-10-15 | Stop reason: HOSPADM

## 2018-10-15 RX ORDER — LEVETIRACETAM 750 MG/1
1500 TABLET, FILM COATED ORAL 2 TIMES DAILY
COMMUNITY
End: 2021-08-12

## 2018-10-15 RX ORDER — GUAIFENESIN 100 MG/5ML
LIQUID (ML) ORAL
Status: COMPLETED
Start: 2018-10-15 | End: 2018-10-15

## 2018-10-15 RX ORDER — LIDOCAINE HYDROCHLORIDE 10 MG/ML
INJECTION INFILTRATION; PERINEURAL AS NEEDED
Status: DISCONTINUED | OUTPATIENT
Start: 2018-10-15 | End: 2018-10-15 | Stop reason: HOSPADM

## 2018-10-15 RX ORDER — DIPHENHYDRAMINE HCL 25 MG
25 CAPSULE ORAL
COMMUNITY
End: 2021-08-12

## 2018-10-15 RX ORDER — LANOLIN ALCOHOL/MO/W.PET/CERES
400 CREAM (GRAM) TOPICAL DAILY
COMMUNITY

## 2018-10-15 RX ORDER — GUAIFENESIN 100 MG/5ML
81 LIQUID (ML) ORAL DAILY
Status: DISCONTINUED | OUTPATIENT
Start: 2018-10-15 | End: 2018-10-15 | Stop reason: HOSPADM

## 2018-10-15 RX ADMIN — Medication 81 MG: at 08:02

## 2018-10-15 RX ADMIN — ASPIRIN 81 MG 81 MG: 81 TABLET ORAL at 08:02

## 2018-10-15 RX ADMIN — SODIUM CHLORIDE 25 ML/HR: 900 INJECTION, SOLUTION INTRAVENOUS at 07:56

## 2018-10-15 RX ADMIN — LORAZEPAM 1 MG: 1 TABLET ORAL at 08:02
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
## 2018-10-15 NOTE — Clinical Note
Contrast Dose Calculator:  
Patient's age: 71.  
Patient's sex: Female. Patient weight (kg) = 96.6. Creatinine level (mg/dL) = 0.92. Creatinine clearance (mL/min): 86.  
Max Contrast dose per Creatinine Cl calculator = 193.5 mL.

## 2018-10-15 NOTE — PROGRESS NOTES
Cardiology Progress Note        Patient: Betty Robin        Sex: female          DOA: 10/15/2018  YOB: 1945      Age:  71 y.o.        LOS:  LOS: 0 days   Assessment/Plan   Date of Surgery Update:  Betty Robin was seen and examined. History and physical has been reviewed. The patient has been examined.  There have been no significant clinical changes since the completion of the originally dated History and Physical.    Signed By: Abe White MD     October 15, 2018 9:18 AM             Signed By: Abe White MD     October 15, 2018

## 2018-10-15 NOTE — IP AVS SNAPSHOT
[address] [address] 
[phone] Patient: Anne De Jesus MRN: [id number] :1945 About your hospitalization You were admitted on:  October 15, 2018 You last received care in the:  [address] You were discharged on:  October 15, 2018 Why you were hospitalized Your primary diagnosis was:  Not on File Follow-up Information Follow up With Details Comments Contact Info Monica Godinez MD   [address] [place] [address] 
Alessandro Paredes [address] 
[phone] Discharge Orders None A check renaldo indicates which time of day the medication should be taken. My Medications CHANGE how you take these medications Instructions Each Dose to Equal  
 Morning Noon Evening Bedtime  
 rivaroxaban 20 mg Tab tablet Commonly known as:  Lyla Fierro What changed:  how much to take Your last dose was: Your next dose is: Take 1 Tab by mouth daily for 1 day. 20 mg CONTINUE taking these medications Instructions Each Dose to Equal  
 Morning Noon Evening Bedtime  
 aspirin 81 mg chewable tablet Your last dose was: Your next dose is: Take 1 Tab by mouth daily. 81 mg  
    
   
   
   
  
 atenolol 100 mg tablet Commonly known as:  TENORMIN Your last dose was: Your next dose is: Take 200 mg by mouth daily. 200 mg  
    
   
   
   
  
 atorvastatin 40 mg tablet Commonly known as:  LIPITOR Your last dose was: Your next dose is: Take 1 Tab by mouth nightly. 40 mg  
    
   
   
   
  
 BENADRYL 25 mg capsule Generic drug:  diphenhydrAMINE Your last dose was: Your next dose is: Take 25 mg by mouth daily as needed. 25 mg  
    
   
   
   
  
 cyanocobalamin 1,000 mcg tablet Your last dose was: Your next dose is: Take 2,000 mcg by mouth daily. 2000 mcg  
    
   
   
   
  
 divalproex  mg ER tablet Commonly known as:  DEPAKOTE ER Your last dose was: Your next dose is: Take 1 Tab by mouth two (2) times a day. 500 mg DULERA 200-5 mcg/actuation HFA inhaler Generic drug:  mometasone-formoterol Your last dose was: Your next dose is: Take 2 Puffs by inhalation two (2) times a day. 2 Puff * gabapentin 300 mg capsule Commonly known as:  NEURONTIN Your last dose was: Your next dose is: Take 300 mg by mouth daily. 300mg am and 600mg pm  
 300 mg  
    
   
   
   
  
 * gabapentin 300 mg capsule Commonly known as:  NEURONTIN Your last dose was: Your next dose is: Take 600 mg by mouth every evening. 600 mg  
    
   
   
   
  
 glimepiride 4 mg tablet Commonly known as:  AMARYL Your last dose was: Your next dose is: Take 4 mg by mouth every morning. 4 mg KEPPRA 750 mg tablet Generic drug:  levETIRAcetam  
   
Your last dose was: Your next dose is: Take 1,500 mg by mouth two (2) times a day. 1500 mg  
    
   
   
   
  
 LANTUS U-100 INSULIN 100 unit/mL injection Generic drug:  insulin glargine Your last dose was: Your next dose is:    
   
   
 30 Units by SubCUTAneous route daily. 30 Units  
    
   
   
   
  
 losartan-hydroCHLOROthiazide 100-12.5 mg per tablet Commonly known as:  HYZAAR Your last dose was: Your next dose is: Take 1 Tab by mouth daily. 1 Tab  
    
   
   
   
  
 magnesium oxide 400 mg tablet Commonly known as:  MAG-OX Your last dose was: Your next dose is: Take 400 mg by mouth. 400 mg  
    
   
   
   
  
 metFORMIN 850 mg tablet Commonly known as:  GLUCOPHAGE  
 Start taking on:  10/17/2018 Your last dose was: Your next dose is: Take 1 Tab by mouth three (3) times daily. 850 mg OXcarbazepine 300 mg tablet Commonly known as:  TRILEPTAL Your last dose was: Your next dose is: Take 300 mg by mouth daily as needed. 300 mg PAMELOR 25 mg capsule Generic drug:  nortriptyline Your last dose was: Your next dose is: Take 100 mg by mouth nightly. 100 mg SINGULAIR 10 mg tablet Generic drug:  montelukast  
   
Your last dose was: Your next dose is: Take 10 mg by mouth every evening. 10 mg  
    
   
   
   
  
 SPIRIVA RESPIMAT 2.5 mcg/actuation inhaler Generic drug:  tiotropium bromide Your last dose was: Your next dose is: Take 1 Puff by inhalation two (2) times a day. 1 Puff  
    
   
   
   
  
 warfarin 5 mg tablet Commonly known as:  COUMADIN Your last dose was: Your next dose is: Take 5 mg by mouth five (5) days a week. TAKE COUMADIN 7.5 MG X 2 DAYS THEN, TAKE COUMADIN 5 MG DAILY NEXT PROTIME WILL BE ON 12/7/17 AT DR. Callum Matthews OFFICE  Indications: DEEP VEIN THROMBOSIS PREVENTION  
 5 mg * Notice: This list has 2 medication(s) that are the same as other medications prescribed for you. Read the directions carefully, and ask your doctor or other care provider to review them with you. Where to Get Your Medications These medications were sent to [id number] [address] [place] [address] Donna Jenelle Phone:  [phone]  
  metFORMIN 850 mg tablet  
 rivaroxaban 20 mg Tab tablet Discharge Instructions DISCHARGE SUMMARY from Nurse PATIENT INSTRUCTIONS: 
 
 
F-face looks uneven A-arms unable to move or move unevenly S-speech slurred or non-existent T-time-call 911 as soon as signs and symptoms begin-DO NOT go Back to bed or wait to see if you get better-TIME IS BRAIN. Warning Signs of HEART ATTACK Call 911 if you have these symptoms: 
? Chest discomfort. Most heart attacks involve discomfort in the center of the chest that lasts more than a few minutes, or that goes away and comes back. It can feel like uncomfortable pressure, squeezing, fullness, or pain. ? Discomfort in other areas of the upper body. Symptoms can include pain or discomfort in one or both arms, the back, neck, jaw, or stomach. ? Shortness of breath with or without chest discomfort. ? Other signs may include breaking out in a cold sweat, nausea, or lightheadedness. Don't wait more than five minutes to call [address]! Fast action can save your life. Calling 911 is almost always the fastest way to get lifesaving treatment. Emergency Medical Services staff can begin treatment when they arrive  up to an hour sooner than if someone gets to the hospital by car. The discharge information has been reviewed with the patient and spouse. The patient and spouse verbalized understanding. Discharge medications reviewed with the patient and spouse and appropriate educational materials and side effects teaching were provided. ___________________________________________________________________________________________________________________________________HEART CATHETERIZATION/ANGIOGRAPHY DISCHARGE INSTRUCTIONS 1. Check puncture site frequently for swelling or bleeding. If there is any bleeding, lie down and apply pressure over the area with a clean towel or washcloth.  Notify your doctor for any redness, swelling, drainage, or oozing from the puncture site. Notify your doctor for any fever or chills. 2. If the extremity becomes cold, numb, or painful call vgo to the emergency room 3. Activity should be limited for the next 48 hours. Climb stairs as little as possible and avoid any stooping, bending, or strenuous activity for 48 hours. No heavy lifting (anything over 10 pounds) for 3 days. 4. You may resume your usual diet. Drink more fluids than usual. 
5. Have a responsible person drive you home and stay with you for at least 24 hours after your heart catheterization/angiography. 6. You may remove bandage from your {ARM/GROIN:21538} in 24 hours. You may shower in 24 hours. No tub baths, hot tubs, or swimming for 1 week. Do not place any lotions, creams, powders, or ointments over puncture site for 1 week. You may place a clean band-aid over the puncture site each day for 5 days. Change daily. I have read the above instructions and have had the opportunity to ask questions. Patient armband removed and shredded RESUME METFORMIN IN 48 HRS 
TAKE XARELTO TO TODAY AND TOMORROW RESUME WARFARIN TODAY Introducing [facility]! [employer] introduces curated.by patient portal. Now you can access parts of your medical record, email your doctor's office, and request medication refills online. 1. In your internet browser, go to [email or web address] 2. Click on the First Time User? Click Here link in the Sign In box. You will see the New Member Sign Up page. 3. Enter your curated.by Access Code exactly as it appears below. You will not need to use this code after youve completed the sign-up process. If you do not sign up before the expiration date, you must request a new code. · curated.by Access Code: [email or web address] Expires: 10/21/2018  2:13 PM 
 
4. Enter the last four digits of your Social Security Number (xxxx) and Date of Birth (mm/dd/yyyy) as indicated and click Submit.  You will be taken to the next sign-up page. 5. Create a [employer] ID. This will be your [employer] login ID and cannot be changed, so think of one that is secure and easy to remember. 6. Create a [employer] password. You can change your password at any time. 7. Enter your Password Reset Question and Answer. This can be used at a later time if you forget your password. 8. Enter your e-mail address. You will receive e-mail notification when new information is available in [address] 9. Click Sign Up. You can now view and download portions of your medical record. 10. Click the Download Summary menu link to download a portable copy of your medical information. If you have questions, please visit the Frequently Asked Questions section of the [employer] website. Remember, [employer] is NOT to be used for urgent needs. For medical emergencies, dial 911. Now available from your iPhone and Android! Introducing Amilcar Patel As a [employer] patient, I wanted to make you aware of our electronic visit tool called Amilcar Bandarrachel. [facility] 24/7 allows you to connect within minutes with a medical provider 24 hours a day, seven days a week via a mobile device or tablet or logging into a secure website from your computer. You can access Amilcar Cheviatonyfin from anywhere in the [place]. A virtual visit might be right for you when you have a simple condition and feel like you just dont want to get out of bed, or cant get away from work for an appointment, when your regular [employer] provider is not available (evenings, weekends or holidays), or when youre out of town and need minor care. Electronic visits cost only $49 and if the [employer] 24/7 provider determines a prescription is needed to treat your condition, one can be electronically transmitted to a nearby pharmacy*. Please take a moment to enroll today if you have not already done so.   The enrollment process is free and takes just a few minutes. To enroll, please download the [employer] 24/7 gregg to your tablet or phone, or visit [email or web address] to enroll on your computer. And, as an [address] [place] [address] patient with a [employer] account, the results of your visits will be scanned into your electronic medical record and your primary care provider will be able to view the scanned results. We urge you to continue to see your regular [employer] provider for your ongoing medical care. And while your primary care provider may not be the one available when you seek a [employer] virtual visit, the peace of mind you get from getting a real diagnosis real time can be priceless. For more information on [employer], view our Frequently Asked Questions (FAQs) at [email or web address]. Sincerely, 
 
Ciera Jasmine MD 
Chief Medical Officer [employer] *:  certain medications cannot be prescribed via [employer] Providers Seen During Your Hospitalization Provider Specialty Primary office phone Juan Jose Bro MD Cardiology [phone] Your Primary Care Physician (PCP) Primary Care Physician Office Phone Office Fax Gonzalo Jha 121 [phone] You are allergic to the following Allergen Reactions Dilaudid (Hydromorphone) Other (comments) Hallucinations Ibuprofen Swelling  
 mouth Recent Documentation Height Weight Breastfeeding? BMI OB Status Smoking Status 1.626 m 97 kg No 36.7 kg/m2 Hysterectomy Former Smoker Emergency Contacts Name Discharge Info Relation Home Work Mobile [address] CAREGIVER [3] Spouse [3] [phone] Patient Belongings The following personal items are in your possession at time of discharge: 
  Dental Appliances: None  Visual Aid: Glasses          Jewelry: Ring  Clothing: At bedside Please provide this summary of care documentation to your next provider. Signatures-by signing, you are acknowledging that this After Visit Summary has been reviewed with you and you have received a copy. Patient Signature:  ____________________________________________________________ Date:  ____________________________________________________________  
  
Jodi Buys Provider Signature:  ____________________________________________________________ Date:  ____________________________________________________________

## 2018-10-15 NOTE — Clinical Note
[place]-Deepak catheter inserted. Measurements taken: pressures, sats and thermodilutions.

## 2018-10-15 NOTE — Clinical Note
Single view of the aortic root obtained using power injection. Total volume = 40 mL. Rate = 20 mL/sec. Pressure = 600 PSI. Rate of rise = 0.5 sec.

## 2018-10-15 NOTE — Clinical Note
TRANSFER - OUT REPORT:  
 
Verbal report given to: Logan Fan RN. Report consisted of patient's Situation, Background, Assessment and  
Recommendations(SBAR). Opportunity for questions and clarification was provided. Patient transported with a Registered Nurse and [address] [place] [address] / Patient [facility] [place] [facility]. Patient transported to: Care Unit.

## 2018-10-15 NOTE — DISCHARGE INSTRUCTIONS
DISCHARGE SUMMARY from Nurse    PATIENT INSTRUCTIONS:    After general anesthesia or intravenous sedation, for 24 hours or while taking prescription Narcotics:  · Limit your activities  · Do not drive and operate hazardous machinery  · Do not make important personal or business decisions  · Do  not drink alcoholic beverages  · If you have not urinated within 8 hours after discharge, please contact your surgeon on call. Report the following to your surgeon:  · Excessive pain, swelling, redness or odor of or around the surgical area  · Temperature over 100.5  · Nausea and vomiting lasting longer than 4 hours or if unable to take medications  · Any signs of decreased circulation or nerve impairment to extremity: change in color, persistent  numbness, tingling, coldness or increase pain  · Any questions    What to do at Home:  Recommended activity: Activity as tolerated,     *  Please give a list of your current medications to your Primary Care Provider. *  Please update this list whenever your medications are discontinued, doses are      changed, or new medications (including over-the-counter products) are added. *  Please carry medication information at all times in case of emergency situations. These are general instructions for a healthy lifestyle:    No smoking/ No tobacco products/ Avoid exposure to second hand smoke  Surgeon General's Warning:  Quitting smoking now greatly reduces serious risk to your health. Obesity, smoking, and sedentary lifestyle greatly increases your risk for illness    A healthy diet, regular physical exercise & weight monitoring are important for maintaining a healthy lifestyle    You may be retaining fluid if you have a history of heart failure or if you experience any of the following symptoms:  Weight gain of 3 pounds or more overnight or 5 pounds in a week, increased swelling in our hands or feet or shortness of breath while lying flat in bed.   Please call your doctor as soon as you notice any of these symptoms; do not wait until your next office visit. Recognize signs and symptoms of STROKE:    F-face looks uneven    A-arms unable to move or move unevenly    S-speech slurred or non-existent    T-time-call 911 as soon as signs and symptoms begin-DO NOT go       Back to bed or wait to see if you get better-TIME IS BRAIN. Warning Signs of HEART ATTACK     Call 911 if you have these symptoms:   Chest discomfort. Most heart attacks involve discomfort in the center of the chest that lasts more than a few minutes, or that goes away and comes back. It can feel like uncomfortable pressure, squeezing, fullness, or pain.  Discomfort in other areas of the upper body. Symptoms can include pain or discomfort in one or both arms, the back, neck, jaw, or stomach.  Shortness of breath with or without chest discomfort.  Other signs may include breaking out in a cold sweat, nausea, or lightheadedness. Don't wait more than five minutes to call 911 - MINUTES MATTER! Fast action can save your life. Calling 911 is almost always the fastest way to get lifesaving treatment. Emergency Medical Services staff can begin treatment when they arrive -- up to an hour sooner than if someone gets to the hospital by car. The discharge information has been reviewed with the patient and spouse. The patient and spouse verbalized understanding. Discharge medications reviewed with the patient and spouse and appropriate educational materials and side effects teaching were provided. ___________________________________________________________________________________________________________________________________HEART CATHETERIZATION/ANGIOGRAPHY DISCHARGE INSTRUCTIONS    1. Check puncture site frequently for swelling or bleeding. If there is any bleeding, lie down and apply pressure over the area with a clean towel or washcloth.  Notify your doctor for any redness, swelling, drainage, or oozing from the puncture site. Notify your doctor for any fever or chills. 2. If the extremity becomes cold, numb, or painful call vgo to the emergency room  3. Activity should be limited for the next 48 hours. Climb stairs as little as possible and avoid any stooping, bending, or strenuous activity for 48 hours. No heavy lifting (anything over 10 pounds) for 3 days. 4. You may resume your usual diet. Drink more fluids than usual.  5. Have a responsible person drive you home and stay with you for at least 24 hours after your heart catheterization/angiography. 6. You may remove bandage from your {ARM/GROIN:56691} in 24 hours. You may shower in 24 hours. No tub baths, hot tubs, or swimming for 1 week. Do not place any lotions, creams, powders, or ointments over puncture site for 1 week. You may place a clean band-aid over the puncture site each day for 5 days. Change daily. I have read the above instructions and have had the opportunity to ask questions.       Patient armband removed and shredded    RESUME METFORMIN IN 48 HRS  TAKE XARELTO TO TODAY AND TOMORROW  RESUME WARFARIN TODAY

## 2018-10-15 NOTE — PROGRESS NOTES
1100 no change in pts condition or site  1130 sheath removed from right groin area, melissa pressure to site for 20 minutes.  Gauge and Tegaderm to site,  Site soft to palpate , no drainage noted, pedal pulses remain with doppler  Tr band removed from right wrist area, 2x2 and Tegaderm applied to site, neuro/vasc assessment of right hand and arm WNL

## 2018-10-15 NOTE — Clinical Note
TRANSFER - IN REPORT:  
 
Verbal report received from: Adolfo Teresa RN trans. Report consisted of patient's Situation, Background, Assessment and  
Recommendations(SBAR). Opportunity for questions and clarification was provided. Assessment completed upon patient's arrival to unit and care assumed. Patient transported with a Cardiac Cath Tech / Patient Care Tech.

## 2018-10-15 NOTE — PROGRESS NOTES
Pt returned from cath lab, pt [id number], denies any pain or distress at this time,  Tr band intact to right wrist area, site clean, dry and intact,  Venous sheath intact to right groin area, Tegaderm over site, no drainage noted.   Soft to palpate around site

## 2018-10-17 LAB
CRD SYSTOLIC BP: 153
END DIASTOLIC PRESSURE: 10

## 2019-02-24 ENCOUNTER — HOSPITAL ENCOUNTER (INPATIENT)
Age: 74
LOS: 2 days | Discharge: HOME | DRG: 689 | End: 2019-02-26
Attending: EMERGENCY MEDICINE | Admitting: INTERNAL MEDICINE
Payer: MEDICARE

## 2019-02-24 ENCOUNTER — APPOINTMENT (OUTPATIENT)
Dept: GENERAL RADIOLOGY | Age: 74
DRG: 689 | End: 2019-02-24
Attending: EMERGENCY MEDICINE
Payer: MEDICARE

## 2019-02-24 ENCOUNTER — APPOINTMENT (OUTPATIENT)
Dept: CT IMAGING | Age: 74
DRG: 689 | End: 2019-02-24
Attending: EMERGENCY MEDICINE
Payer: MEDICARE

## 2019-02-24 DIAGNOSIS — N39.0 URINARY TRACT INFECTION WITHOUT HEMATURIA, SITE UNSPECIFIED: ICD-10-CM

## 2019-02-24 DIAGNOSIS — E86.0 DEHYDRATION: ICD-10-CM

## 2019-02-24 DIAGNOSIS — G93.40 ACUTE ENCEPHALOPATHY: Primary | ICD-10-CM

## 2019-02-24 LAB
ALBUMIN SERPL-MCNC: 3.4 G/DL (ref 3.4–5)
ALBUMIN/GLOB SERPL: 0.9 {RATIO} (ref 0.8–1.7)
ALP SERPL-CCNC: 100 U/L (ref 45–117)
ALT SERPL-CCNC: 22 U/L (ref 13–56)
AMMONIA PLAS-SCNC: 15 UMOL/L (ref 11–32)
AMPHET UR QL SCN: NEGATIVE
ANION GAP SERPL CALC-SCNC: 7 MMOL/L (ref 3–18)
APAP SERPL-MCNC: <2 UG/ML (ref 10–30)
APPEARANCE UR: ABNORMAL
AST SERPL-CCNC: 21 U/L (ref 15–37)
BACTERIA URNS QL MICRO: ABNORMAL /HPF
BARBITURATES UR QL SCN: NEGATIVE
BASOPHILS # BLD: 0.1 K/UL (ref 0–0.1)
BASOPHILS NFR BLD: 1 % (ref 0–2)
BENZODIAZ UR QL: NEGATIVE
BILIRUB SERPL-MCNC: 0.2 MG/DL (ref 0.2–1)
BILIRUB UR QL: NEGATIVE
BUN SERPL-MCNC: 18 MG/DL (ref 7–18)
BUN/CREAT SERPL: 16 (ref 12–20)
CALCIUM SERPL-MCNC: 8.6 MG/DL (ref 8.5–10.1)
CANNABINOIDS UR QL SCN: NEGATIVE
CHLORIDE SERPL-SCNC: 107 MMOL/L (ref 100–108)
CK MB CFR SERPL CALC: 1.7 % (ref 0–4)
CK MB SERPL-MCNC: 1.4 NG/ML (ref 5–25)
CK SERPL-CCNC: 81 U/L (ref 26–192)
CO2 SERPL-SCNC: 28 MMOL/L (ref 21–32)
COCAINE UR QL SCN: NEGATIVE
COLOR UR: YELLOW
CREAT SERPL-MCNC: 1.1 MG/DL (ref 0.6–1.3)
DIFFERENTIAL METHOD BLD: ABNORMAL
EOSINOPHIL # BLD: 0.6 K/UL (ref 0–0.4)
EOSINOPHIL NFR BLD: 10 % (ref 0–5)
EPITH CASTS URNS QL MICRO: ABNORMAL /LPF (ref 0–5)
ERYTHROCYTE [DISTWIDTH] IN BLOOD BY AUTOMATED COUNT: 18.6 % (ref 11.6–14.5)
EST. AVERAGE GLUCOSE BLD GHB EST-MCNC: 169 MG/DL
ETHANOL SERPL-MCNC: 4 MG/DL (ref 0–3)
GLOBULIN SER CALC-MCNC: 3.7 G/DL (ref 2–4)
GLUCOSE BLD STRIP.AUTO-MCNC: 117 MG/DL (ref 70–110)
GLUCOSE BLD STRIP.AUTO-MCNC: 92 MG/DL (ref 70–110)
GLUCOSE SERPL-MCNC: 97 MG/DL (ref 74–99)
GLUCOSE UR STRIP.AUTO-MCNC: NEGATIVE MG/DL
HBA1C MFR BLD: 7.5 % (ref 4.2–5.6)
HCT VFR BLD AUTO: 37.2 % (ref 35–45)
HDSCOM,HDSCOM: NORMAL
HGB BLD-MCNC: 11.6 G/DL (ref 12–16)
HGB UR QL STRIP: ABNORMAL
INR PPP: 3.7 (ref 0.8–1.2)
KETONES UR QL STRIP.AUTO: NEGATIVE MG/DL
LACTATE BLD-SCNC: 2 MMOL/L (ref 0.4–2)
LACTATE BLD-SCNC: 3.44 MMOL/L (ref 0.4–2)
LACTATE SERPL-SCNC: 1.6 MMOL/L (ref 0.4–2)
LEUKOCYTE ESTERASE UR QL STRIP.AUTO: ABNORMAL
LYMPHOCYTES # BLD: 2.2 K/UL (ref 0.9–3.6)
LYMPHOCYTES NFR BLD: 37 % (ref 21–52)
MCH RBC QN AUTO: 27.1 PG (ref 24–34)
MCHC RBC AUTO-ENTMCNC: 31.2 G/DL (ref 31–37)
MCV RBC AUTO: 86.9 FL (ref 74–97)
METHADONE UR QL: NEGATIVE
MONOCYTES # BLD: 0.3 K/UL (ref 0.05–1.2)
MONOCYTES NFR BLD: 5 % (ref 3–10)
NEUTS SEG # BLD: 2.8 K/UL (ref 1.8–8)
NEUTS SEG NFR BLD: 47 % (ref 40–73)
NITRITE UR QL STRIP.AUTO: POSITIVE
OPIATES UR QL: NEGATIVE
PCP UR QL: NEGATIVE
PH UR STRIP: 5 [PH] (ref 5–8)
PLATELET # BLD AUTO: 258 K/UL (ref 135–420)
PMV BLD AUTO: 10 FL (ref 9.2–11.8)
POTASSIUM SERPL-SCNC: 4.2 MMOL/L (ref 3.5–5.5)
PROT SERPL-MCNC: 7.1 G/DL (ref 6.4–8.2)
PROT UR STRIP-MCNC: NEGATIVE MG/DL
PROTHROMBIN TIME: 37.1 SEC (ref 11.5–15.2)
RBC # BLD AUTO: 4.28 M/UL (ref 4.2–5.3)
RBC #/AREA URNS HPF: ABNORMAL /HPF (ref 0–5)
SALICYLATES SERPL-MCNC: <2.8 MG/DL (ref 2.8–20)
SODIUM SERPL-SCNC: 142 MMOL/L (ref 136–145)
SP GR UR REFRACTOMETRY: 1.02 (ref 1–1.03)
TROPONIN I SERPL-MCNC: <0.02 NG/ML (ref 0–0.04)
UROBILINOGEN UR QL STRIP.AUTO: 0.2 EU/DL (ref 0.2–1)
WBC # BLD AUTO: 6 K/UL (ref 4.6–13.2)
WBC URNS QL MICRO: ABNORMAL /HPF (ref 0–5)

## 2019-02-24 PROCEDURE — 80307 DRUG TEST PRSMV CHEM ANLYZR: CPT

## 2019-02-24 PROCEDURE — 82550 ASSAY OF CK (CPK): CPT

## 2019-02-24 PROCEDURE — 87086 URINE CULTURE/COLONY COUNT: CPT

## 2019-02-24 PROCEDURE — 65660000000 HC RM CCU STEPDOWN

## 2019-02-24 PROCEDURE — 36415 COLL VENOUS BLD VENIPUNCTURE: CPT

## 2019-02-24 PROCEDURE — 74011250636 HC RX REV CODE- 250/636: Performed by: INTERNAL MEDICINE

## 2019-02-24 PROCEDURE — 93005 ELECTROCARDIOGRAM TRACING: CPT

## 2019-02-24 PROCEDURE — 82962 GLUCOSE BLOOD TEST: CPT

## 2019-02-24 PROCEDURE — 99285 EMERGENCY DEPT VISIT HI MDM: CPT

## 2019-02-24 PROCEDURE — 74011250636 HC RX REV CODE- 250/636: Performed by: EMERGENCY MEDICINE

## 2019-02-24 PROCEDURE — 96360 HYDRATION IV INFUSION INIT: CPT

## 2019-02-24 PROCEDURE — 87186 SC STD MICRODIL/AGAR DIL: CPT

## 2019-02-24 PROCEDURE — 85025 COMPLETE CBC W/AUTO DIFF WBC: CPT

## 2019-02-24 PROCEDURE — 80053 COMPREHEN METABOLIC PANEL: CPT

## 2019-02-24 PROCEDURE — 83036 HEMOGLOBIN GLYCOSYLATED A1C: CPT

## 2019-02-24 PROCEDURE — 81001 URINALYSIS AUTO W/SCOPE: CPT

## 2019-02-24 PROCEDURE — 74011250637 HC RX REV CODE- 250/637: Performed by: INTERNAL MEDICINE

## 2019-02-24 PROCEDURE — 85610 PROTHROMBIN TIME: CPT

## 2019-02-24 PROCEDURE — 71045 X-RAY EXAM CHEST 1 VIEW: CPT

## 2019-02-24 PROCEDURE — 87040 BLOOD CULTURE FOR BACTERIA: CPT

## 2019-02-24 PROCEDURE — 87077 CULTURE AEROBIC IDENTIFY: CPT

## 2019-02-24 PROCEDURE — 74011000250 HC RX REV CODE- 250: Performed by: EMERGENCY MEDICINE

## 2019-02-24 PROCEDURE — 70450 CT HEAD/BRAIN W/O DYE: CPT

## 2019-02-24 PROCEDURE — 83605 ASSAY OF LACTIC ACID: CPT

## 2019-02-24 PROCEDURE — 82140 ASSAY OF AMMONIA: CPT

## 2019-02-24 RX ORDER — BUDESONIDE AND FORMOTEROL FUMARATE DIHYDRATE 160; 4.5 UG/1; UG/1
2 AEROSOL RESPIRATORY (INHALATION) 2 TIMES DAILY
COMMUNITY
End: 2022-07-12

## 2019-02-24 RX ORDER — MONTELUKAST SODIUM 10 MG/1
10 TABLET ORAL EVERY EVENING
Status: DISCONTINUED | OUTPATIENT
Start: 2019-02-24 | End: 2019-02-26 | Stop reason: HOSPADM

## 2019-02-24 RX ORDER — LEVOFLOXACIN 5 MG/ML
750 INJECTION, SOLUTION INTRAVENOUS EVERY 24 HOURS
Status: DISCONTINUED | OUTPATIENT
Start: 2019-02-24 | End: 2019-02-26 | Stop reason: HOSPADM

## 2019-02-24 RX ORDER — GUAIFENESIN 100 MG/5ML
81 LIQUID (ML) ORAL DAILY
Status: DISCONTINUED | OUTPATIENT
Start: 2019-02-25 | End: 2019-02-26 | Stop reason: HOSPADM

## 2019-02-24 RX ORDER — MAGNESIUM SULFATE 100 %
4 CRYSTALS MISCELLANEOUS AS NEEDED
Status: DISCONTINUED | OUTPATIENT
Start: 2019-02-24 | End: 2019-02-26 | Stop reason: HOSPADM

## 2019-02-24 RX ORDER — SIMVASTATIN 20 MG/1
20 TABLET, FILM COATED ORAL
COMMUNITY
End: 2021-08-12

## 2019-02-24 RX ORDER — FLUTICASONE FUROATE AND VILANTEROL 200; 25 UG/1; UG/1
1 POWDER RESPIRATORY (INHALATION) DAILY
Status: DISCONTINUED | OUTPATIENT
Start: 2019-02-25 | End: 2019-02-26 | Stop reason: HOSPADM

## 2019-02-24 RX ORDER — NORTRIPTYLINE HYDROCHLORIDE 25 MG/1
100 CAPSULE ORAL
Status: DISCONTINUED | OUTPATIENT
Start: 2019-02-24 | End: 2019-02-26 | Stop reason: HOSPADM

## 2019-02-24 RX ORDER — DEXTROSE 50 % IN WATER (D50W) INTRAVENOUS SYRINGE
25-50 AS NEEDED
Status: DISCONTINUED | OUTPATIENT
Start: 2019-02-24 | End: 2019-02-26 | Stop reason: HOSPADM

## 2019-02-24 RX ORDER — SODIUM CHLORIDE 9 MG/ML
647 INJECTION, SOLUTION INTRAVENOUS ONCE
Status: COMPLETED | OUTPATIENT
Start: 2019-02-24 | End: 2019-02-24

## 2019-02-24 RX ORDER — ATORVASTATIN CALCIUM 20 MG/1
40 TABLET, FILM COATED ORAL
Status: DISCONTINUED | OUTPATIENT
Start: 2019-02-24 | End: 2019-02-26 | Stop reason: HOSPADM

## 2019-02-24 RX ORDER — INSULIN GLARGINE 100 [IU]/ML
30 INJECTION, SOLUTION SUBCUTANEOUS DAILY
Status: DISCONTINUED | OUTPATIENT
Start: 2019-02-25 | End: 2019-02-25

## 2019-02-24 RX ORDER — INSULIN LISPRO 100 [IU]/ML
INJECTION, SOLUTION INTRAVENOUS; SUBCUTANEOUS
Status: DISCONTINUED | OUTPATIENT
Start: 2019-02-24 | End: 2019-02-26 | Stop reason: HOSPADM

## 2019-02-24 RX ORDER — SODIUM CHLORIDE 9 MG/ML
100 INJECTION, SOLUTION INTRAVENOUS CONTINUOUS
Status: DISCONTINUED | OUTPATIENT
Start: 2019-02-24 | End: 2019-02-26 | Stop reason: HOSPADM

## 2019-02-24 RX ORDER — ATENOLOL 50 MG/1
200 TABLET ORAL DAILY
Status: DISCONTINUED | OUTPATIENT
Start: 2019-02-25 | End: 2019-02-26 | Stop reason: HOSPADM

## 2019-02-24 RX ORDER — ACETAMINOPHEN 325 MG/1
650 TABLET ORAL
Status: DISCONTINUED | OUTPATIENT
Start: 2019-02-24 | End: 2019-02-26 | Stop reason: HOSPADM

## 2019-02-24 RX ORDER — SODIUM CHLORIDE 0.9 % (FLUSH) 0.9 %
5-10 SYRINGE (ML) INJECTION AS NEEDED
Status: DISCONTINUED | OUTPATIENT
Start: 2019-02-24 | End: 2019-02-26 | Stop reason: HOSPADM

## 2019-02-24 RX ADMIN — TIOTROPIUM BROMIDE INHALATION SPRAY 1 PUFF: 3.12 SPRAY, METERED RESPIRATORY (INHALATION) at 21:27

## 2019-02-24 RX ADMIN — SODIUM CHLORIDE 1000 ML: 900 INJECTION, SOLUTION INTRAVENOUS at 18:14

## 2019-02-24 RX ADMIN — ATORVASTATIN CALCIUM 40 MG: 20 TABLET, FILM COATED ORAL at 21:26

## 2019-02-24 RX ADMIN — SODIUM CHLORIDE 100 ML/HR: 900 INJECTION, SOLUTION INTRAVENOUS at 20:46

## 2019-02-24 RX ADMIN — ACETAMINOPHEN 650 MG: 325 TABLET ORAL at 21:51

## 2019-02-24 RX ADMIN — SODIUM CHLORIDE 1000 ML: 900 INJECTION, SOLUTION INTRAVENOUS at 16:54

## 2019-02-24 RX ADMIN — SODIUM CHLORIDE 647 ML: 900 INJECTION, SOLUTION INTRAVENOUS at 20:51

## 2019-02-24 RX ADMIN — CEFEPIME 2 G: 2 INJECTION, POWDER, FOR SOLUTION INTRAVENOUS at 18:15

## 2019-02-24 RX ADMIN — LEVOFLOXACIN 750 MG: 5 INJECTION, SOLUTION INTRAVENOUS at 18:15
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
## 2019-02-24 NOTE — PROGRESS NOTES
Pharmacist Renal Dosing Progress Note for Cefepime    The following medication: Cefepime was automatically dose-adjusted per THE [facility] P&T Committee Protocol, with respect to renal function. Consult provided for this   68 y.o. , female , for the indication of UTI. Dose adjusted to:  Cefepime 2 grams IV q12h    Pt Weight:   Wt Readings from Last 1 Encounters:   02/24/19 89.8 kg (198 lb)     Previous Regimen   Cefepime 2 grams IV q8h   Serum Creatinine Lab Results   Component Value Date/Time    Creatinine 1.10 02/24/2019 01:05 PM       Creatinine Clearance Estimated Creatinine Clearance: 50.1 mL/min (based on SCr of 1.1 mg/dL). BUN Lab Results   Component Value Date/Time    BUN 18 02/24/2019 01:05 PM         Pharmacy to continue to monitor patient daily. Will make dosage adjustments based upon changing renal function.   Signed Cristina Parker information:   [phone]

## 2019-02-24 NOTE — ED TRIAGE NOTES
Pt states having a hard time walking, speech, onset 3 days ago. Pt has been out of her Keppra for several days, pt started back on Keppra thurs. 1 week ago.

## 2019-02-24 NOTE — ED NOTES
Attempted to call report to receiving [facility] RN. RN unable to take report at this time will re attempted to call report shortly.
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
## 2019-02-25 ENCOUNTER — APPOINTMENT (OUTPATIENT)
Dept: MRI IMAGING | Age: 74
DRG: 689 | End: 2019-02-25
Attending: HOSPITALIST
Payer: MEDICARE

## 2019-02-25 LAB
ALBUMIN SERPL-MCNC: 2.8 G/DL (ref 3.4–5)
ALBUMIN/GLOB SERPL: 0.8 {RATIO} (ref 0.8–1.7)
ALP SERPL-CCNC: 82 U/L (ref 45–117)
ALT SERPL-CCNC: 20 U/L (ref 13–56)
ANION GAP SERPL CALC-SCNC: 6 MMOL/L (ref 3–18)
AST SERPL-CCNC: 18 U/L (ref 15–37)
BASOPHILS # BLD: 0 K/UL (ref 0–0.1)
BASOPHILS NFR BLD: 1 % (ref 0–2)
BILIRUB SERPL-MCNC: 0.3 MG/DL (ref 0.2–1)
BUN SERPL-MCNC: 14 MG/DL (ref 7–18)
BUN/CREAT SERPL: 16 (ref 12–20)
CALCIUM SERPL-MCNC: 7.6 MG/DL (ref 8.5–10.1)
CHLORIDE SERPL-SCNC: 110 MMOL/L (ref 100–108)
CO2 SERPL-SCNC: 27 MMOL/L (ref 21–32)
CREAT SERPL-MCNC: 0.85 MG/DL (ref 0.6–1.3)
DIFFERENTIAL METHOD BLD: ABNORMAL
EOSINOPHIL # BLD: 0.4 K/UL (ref 0–0.4)
EOSINOPHIL NFR BLD: 9 % (ref 0–5)
ERYTHROCYTE [DISTWIDTH] IN BLOOD BY AUTOMATED COUNT: 18.4 % (ref 11.6–14.5)
GLOBULIN SER CALC-MCNC: 3.5 G/DL (ref 2–4)
GLUCOSE BLD STRIP.AUTO-MCNC: 122 MG/DL (ref 70–110)
GLUCOSE BLD STRIP.AUTO-MCNC: 183 MG/DL (ref 70–110)
GLUCOSE BLD STRIP.AUTO-MCNC: 48 MG/DL (ref 70–110)
GLUCOSE BLD STRIP.AUTO-MCNC: 52 MG/DL (ref 70–110)
GLUCOSE BLD STRIP.AUTO-MCNC: 80 MG/DL (ref 70–110)
GLUCOSE BLD STRIP.AUTO-MCNC: 81 MG/DL (ref 70–110)
GLUCOSE SERPL-MCNC: 46 MG/DL (ref 74–99)
HCT VFR BLD AUTO: 31.7 % (ref 35–45)
HGB BLD-MCNC: 9.8 G/DL (ref 12–16)
INR PPP: 3.7 (ref 0.8–1.2)
LYMPHOCYTES # BLD: 1.8 K/UL (ref 0.9–3.6)
LYMPHOCYTES NFR BLD: 40 % (ref 21–52)
MAGNESIUM SERPL-MCNC: 1.7 MG/DL (ref 1.6–2.6)
MCH RBC QN AUTO: 26.6 PG (ref 24–34)
MCHC RBC AUTO-ENTMCNC: 30.9 G/DL (ref 31–37)
MCV RBC AUTO: 85.9 FL (ref 74–97)
MONOCYTES # BLD: 0.4 K/UL (ref 0.05–1.2)
MONOCYTES NFR BLD: 8 % (ref 3–10)
NEUTS SEG # BLD: 1.9 K/UL (ref 1.8–8)
NEUTS SEG NFR BLD: 42 % (ref 40–73)
PHOSPHATE SERPL-MCNC: 3 MG/DL (ref 2.5–4.9)
PLATELET # BLD AUTO: 202 K/UL (ref 135–420)
PMV BLD AUTO: 9.4 FL (ref 9.2–11.8)
POTASSIUM SERPL-SCNC: 4.1 MMOL/L (ref 3.5–5.5)
PROT SERPL-MCNC: 6.3 G/DL (ref 6.4–8.2)
PROTHROMBIN TIME: 37.5 SEC (ref 11.5–15.2)
RBC # BLD AUTO: 3.69 M/UL (ref 4.2–5.3)
SODIUM SERPL-SCNC: 143 MMOL/L (ref 136–145)
TSH SERPL DL<=0.05 MIU/L-ACNC: 5.6 UIU/ML (ref 0.36–3.74)
WBC # BLD AUTO: 4.5 K/UL (ref 4.6–13.2)

## 2019-02-25 PROCEDURE — 36415 COLL VENOUS BLD VENIPUNCTURE: CPT

## 2019-02-25 PROCEDURE — 77010033678 HC OXYGEN DAILY

## 2019-02-25 PROCEDURE — 94640 AIRWAY INHALATION TREATMENT: CPT

## 2019-02-25 PROCEDURE — 74011000250 HC RX REV CODE- 250: Performed by: HOSPITALIST

## 2019-02-25 PROCEDURE — 74011250636 HC RX REV CODE- 250/636: Performed by: HOSPITALIST

## 2019-02-25 PROCEDURE — 74011250637 HC RX REV CODE- 250/637: Performed by: INTERNAL MEDICINE

## 2019-02-25 PROCEDURE — 84100 ASSAY OF PHOSPHORUS: CPT

## 2019-02-25 PROCEDURE — 65660000000 HC RM CCU STEPDOWN

## 2019-02-25 PROCEDURE — 82962 GLUCOSE BLOOD TEST: CPT

## 2019-02-25 PROCEDURE — 85025 COMPLETE CBC W/AUTO DIFF WBC: CPT

## 2019-02-25 PROCEDURE — 97167 OT EVAL HIGH COMPLEX 60 MIN: CPT

## 2019-02-25 PROCEDURE — 83735 ASSAY OF MAGNESIUM: CPT

## 2019-02-25 PROCEDURE — 74011000250 HC RX REV CODE- 250: Performed by: EMERGENCY MEDICINE

## 2019-02-25 PROCEDURE — 87040 BLOOD CULTURE FOR BACTERIA: CPT

## 2019-02-25 PROCEDURE — 74011250636 HC RX REV CODE- 250/636: Performed by: EMERGENCY MEDICINE

## 2019-02-25 PROCEDURE — 77030013140 HC MSK NEB VYRM -A

## 2019-02-25 PROCEDURE — 74011250636 HC RX REV CODE- 250/636: Performed by: INTERNAL MEDICINE

## 2019-02-25 PROCEDURE — 80053 COMPREHEN METABOLIC PANEL: CPT

## 2019-02-25 PROCEDURE — 74011250637 HC RX REV CODE- 250/637: Performed by: HOSPITALIST

## 2019-02-25 PROCEDURE — 84443 ASSAY THYROID STIM HORMONE: CPT

## 2019-02-25 PROCEDURE — 85610 PROTHROMBIN TIME: CPT

## 2019-02-25 PROCEDURE — 74011636637 HC RX REV CODE- 636/637: Performed by: HOSPITALIST

## 2019-02-25 PROCEDURE — 70551 MRI BRAIN STEM W/O DYE: CPT

## 2019-02-25 RX ORDER — GABAPENTIN 300 MG/1
300 CAPSULE ORAL 2 TIMES DAILY
Status: DISCONTINUED | OUTPATIENT
Start: 2019-02-25 | End: 2019-02-26 | Stop reason: HOSPADM

## 2019-02-25 RX ORDER — VANCOMYCIN HYDROCHLORIDE
1250 EVERY 12 HOURS
Status: DISCONTINUED | OUTPATIENT
Start: 2019-02-26 | End: 2019-02-26 | Stop reason: HOSPADM

## 2019-02-25 RX ORDER — INSULIN GLARGINE 100 [IU]/ML
20 INJECTION, SOLUTION SUBCUTANEOUS DAILY
Status: DISCONTINUED | OUTPATIENT
Start: 2019-02-25 | End: 2019-02-26 | Stop reason: HOSPADM

## 2019-02-25 RX ORDER — IPRATROPIUM BROMIDE AND ALBUTEROL SULFATE 2.5; .5 MG/3ML; MG/3ML
3 SOLUTION RESPIRATORY (INHALATION)
Status: DISCONTINUED | OUTPATIENT
Start: 2019-02-25 | End: 2019-02-26 | Stop reason: HOSPADM

## 2019-02-25 RX ORDER — VANCOMYCIN/0.9 % SOD CHLORIDE 1.5G/250ML
1500 PLASTIC BAG, INJECTION (ML) INTRAVENOUS ONCE
Status: COMPLETED | OUTPATIENT
Start: 2019-02-25 | End: 2019-02-25

## 2019-02-25 RX ORDER — ONDANSETRON 2 MG/ML
INJECTION INTRAMUSCULAR; INTRAVENOUS
Status: DISPENSED
Start: 2019-02-25 | End: 2019-02-25

## 2019-02-25 RX ORDER — ONDANSETRON 2 MG/ML
4 INJECTION INTRAMUSCULAR; INTRAVENOUS ONCE
Status: COMPLETED | OUTPATIENT
Start: 2019-02-25 | End: 2019-02-25

## 2019-02-25 RX ORDER — BUTALBITAL, ACETAMINOPHEN AND CAFFEINE 50; 325; 40 MG/1; MG/1; MG/1
1 TABLET ORAL ONCE
Status: COMPLETED | OUTPATIENT
Start: 2019-02-25 | End: 2019-02-25

## 2019-02-25 RX ADMIN — ACETAMINOPHEN 650 MG: 325 TABLET ORAL at 10:16

## 2019-02-25 RX ADMIN — ATORVASTATIN CALCIUM 40 MG: 20 TABLET, FILM COATED ORAL at 22:31

## 2019-02-25 RX ADMIN — VANCOMYCIN HYDROCHLORIDE 1500 MG: 10 INJECTION, POWDER, LYOPHILIZED, FOR SOLUTION INTRAVENOUS at 20:29

## 2019-02-25 RX ADMIN — GABAPENTIN 300 MG: 300 CAPSULE ORAL at 22:31

## 2019-02-25 RX ADMIN — CEFEPIME 2 G: 2 INJECTION, POWDER, FOR SOLUTION INTRAVENOUS at 05:14

## 2019-02-25 RX ADMIN — ACETAMINOPHEN 650 MG: 325 TABLET ORAL at 01:25

## 2019-02-25 RX ADMIN — IPRATROPIUM BROMIDE AND ALBUTEROL SULFATE 3 ML: .5; 3 SOLUTION RESPIRATORY (INHALATION) at 15:44

## 2019-02-25 RX ADMIN — ASPIRIN 81 MG 81 MG: 81 TABLET ORAL at 08:25

## 2019-02-25 RX ADMIN — NORTRIPTYLINE HYDROCHLORIDE 100 MG: 25 CAPSULE ORAL at 22:31

## 2019-02-25 RX ADMIN — SODIUM CHLORIDE 100 ML/HR: 900 INJECTION, SOLUTION INTRAVENOUS at 05:14

## 2019-02-25 RX ADMIN — ATENOLOL 200 MG: 50 TABLET ORAL at 08:25

## 2019-02-25 RX ADMIN — LEVOFLOXACIN 750 MG: 5 INJECTION, SOLUTION INTRAVENOUS at 17:38

## 2019-02-25 RX ADMIN — CEFEPIME 2 G: 2 INJECTION, POWDER, FOR SOLUTION INTRAVENOUS at 17:38

## 2019-02-25 RX ADMIN — INSULIN GLARGINE 20 UNITS: 100 INJECTION, SOLUTION SUBCUTANEOUS at 10:15

## 2019-02-25 RX ADMIN — BUTALBITAL, ACETAMINOPHEN AND CAFFEINE 1 TABLET: 50; 325; 40 TABLET ORAL at 12:37

## 2019-02-25 RX ADMIN — ONDANSETRON 4 MG: 2 INJECTION INTRAMUSCULAR; INTRAVENOUS at 01:24

## 2019-02-25 RX ADMIN — ONDANSETRON 4 MG: 2 INJECTION INTRAMUSCULAR; INTRAVENOUS at 15:12

## 2019-02-25 RX ADMIN — ACETAMINOPHEN 650 MG: 325 TABLET ORAL at 17:45

## 2019-02-25 NOTE — PROGRESS NOTES
Reason for Admission:   Altered mental status                  RRAT Score:  20                Do you (patient/family) have any concerns for transition/discharge? TBD                Plan for utilizing home health:   TBD      Likelihood of readmission? no            Transition of Care Plan:   Chart reviewed  Pt came to ED with spouse with c/o confusion,slow speech,trouble walking,adsmitting diagnosis of acute encephalopathy, hx asthma,COPD,diabetes,HTN,,seizures, cm will continue to review and discuss d/c planning with pt and spouse . Care Management Interventions  PCP Verified by CM: Yes  Palliative Care Criteria Met (RRAT>21 & CHF Dx)?: No  Current Support Network: Lives with Spouse      11:00-cm met with pt at bedside,explained cm role as d/c planner,pt informed cm she lives at home with spouse, no longer drives due to seizure disorder and medically advised not to,family very supportive and makes sure she gets to her appointments,pt has home walker and cane if needed,uses it prn,cm noted PT and OT eval has been placed, will cont to review for recommendations.

## 2019-02-25 NOTE — PROGRESS NOTES
0700: Assumed care of pt from [place] [id number].  1000: Pt resting no sign of distress. Call light within reach. 1026 Pt c/o of headache gave tylenol. Will reassess see flow sheet. 1200: MRI ordered for frequent headaches. Pain medication also ordered. 1400: Pt resting no sign of distress. Calllight within reach. 1500: Pt resting no sign of distress. 1600: Pt resting no sign of distress. 1745:pt c/o headache gave tylenol will reassess see flow sheet. 1900 pt c/o headache to soon to give pain medication.

## 2019-02-25 NOTE — PROGRESS NOTES
Pharmacy Dosing Services: Vancomycin     Consult for Vancomycin Dosing by Pharmacy by Dr. Niru Ricci provided for this 68y.o. year old female , for indication of bloodstream infection   Day of Therapy 1    Ht Readings from Last 1 Encounters:   02/24/19 162.6 cm (64\")        Wt Readings from Last 1 Encounters:   02/24/19 89.8 kg (198 lb)      Other Current Antibiotics Cefepime / Levaquin    Serum Creatinine Lab Results   Component Value Date/Time    Creatinine 0.85 02/25/2019 05:50 AM      Creatinine Clearance Estimated Creatinine Clearance: 64.9 mL/min (based on SCr of 0.85 mg/dL). BUN Lab Results   Component Value Date/Time    BUN 14 02/25/2019 05:50 AM      WBC Lab Results   Component Value Date/Time    WBC 4.5 (L) 02/25/2019 05:50 AM      H/H Lab Results   Component Value Date/Time    HGB 9.8 (L) 02/25/2019 05:50 AM      Platelets Lab Results   Component Value Date/Time    PLATELET 824 85/06/3116 05:50 AM      Temp 98.3 °F (36.8 °C)     Start Vancomycin therapy with a loading dose of 1500 mg. Follow with a maintenance dose of 1250 mg every 12 hours. Dose calculated to approximate a therapeutic trough of 15-20 mcg/mL. Pharmacy to follow daily and will make changes to dose and/or frequency based on clinical status.     Pharmacist Corbin Batista, [facility]
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
## 2019-02-25 NOTE — PROGRESS NOTES

## 2019-02-25 NOTE — ED NOTES
TRANSFER - OUT REPORT:    Verbal report given to [facility] (name) on Honey Dupree  being transferred to [facility] (unit) for routine progression of care       Report consisted of patients Situation, Background, Assessment and   Recommendations(SBAR). Information from the following report(s) SBAR, Kardex and ED Summary was reviewed with the receiving nurse. Lines:   Peripheral IV 02/24/19 Right Antecubital (Active)   Site Assessment Clean, dry, & intact 2/24/2019  3:05 PM   Phlebitis Assessment 0 2/24/2019  3:05 PM   Infiltration Assessment 0 2/24/2019  3:05 PM   Dressing Status Clean, dry, & intact 2/24/2019  3:05 PM   Dressing Type Transparent 2/24/2019  3:05 PM        Opportunity for questions and clarification was provided.       Patient transported with:   Registered Nurse

## 2019-02-25 NOTE — PROGRESS NOTES
Occupational Therapy Screening:  Services are indicated. Evaluate and Treat Order is requested. An InBasket screening referral was triggered for occupational therapy based on results obtained during the nursing admission assessment. The patients chart was reviewed and the patient is appropriate for a skilled therapy evaluation. Patient admitted w/ encephalopathy w/ dysarthria and UTI. Pt has participated w/ OT/PT in the past and has architectural barriers of 2 story home, but was mod I at baseline. Please order a consult for occupational therapy if you are in agreement and would like an evaluation to be completed. Thank you.   Zuleyka Bone, OTR/L

## 2019-02-25 NOTE — PROGRESS NOTES
Problem: Pressure Injury - Risk of  Goal: *Prevention of pressure injury  Document Ryne Scale and appropriate interventions in the flowsheet. Outcome: Progressing Towards Goal  Pressure Injury Interventions:             Activity Interventions: Pressure redistribution bed/mattress(bed type)    Mobility Interventions: HOB 30 degrees or less    Nutrition Interventions: Document food/fluid/supplement intake    Friction and Shear Interventions: HOB 30 degrees or less

## 2019-02-25 NOTE — DIABETES MGMT
NUTRITION / GLYCEMIC CONTROL PLAN OF CARE  - known h/o T2DM, HbA1c within recommended range for age + comorbids on basal + oral home regimen  - recommend d/c pt on PTA DM regimen , pt denies hypoglycemia at home  Diabetes Management:    - recommend: severe hypoglycemia on AM labs, verbal order received to decrease Lantus, monitor BG trends and make adjustments as needed  - education: (see GC RN note)  - goals:    *BG will be in target range of  mg/dl (non-ICU) by 3/2   *PO intake will be 75% of meals offered by 3/2   *Pt will follow up with OP PCP for Diabetes Management by 4/30  - TDD:  Lantus 20 units  - BG range:  mg/dL  - Hypo: yes  - BG in target range (non-ICU: <140; ICU<180): [x] Yes  [] No  - Steroids: no  - Intake:      Patient Vitals for the past 100 hrs:   % Diet Eaten   02/24/19 2100 100 %   Current Insulin regimen:   Lantus 20 units daily  - Humalog Normal Insulin Sensitivity Corrective Coverage  Home medication/insulin regimen:  Lantus 30 units daily  Metformin 850 mg TID  Amaryl 4 mg daily  Recent Glucose Results:   Lab Results   Component Value Date/Time    GLU 46 (LL) 02/25/2019 05:50 AM    GLU 97 02/24/2019 01:05 PM    GLUCPOC 80 02/25/2019 06:25 AM    GLUCPOC 48 (LL) 02/25/2019 06:08 AM    GLUCPOC 52 (LL) 02/25/2019 06:06 AM       Adequate glycemic control PTA:  [x] Yes  [] No    HbA1c: equivalent  to ave BGlucose of 169 mg/dl for 2-3 months prior to admission    Lab Results   Component Value Date/Time    Hemoglobin A1c 7.5 (H) 02/24/2019 07:36 PM     Diet:   Active Orders   Diet    DIET DIABETIC CONSISTENT CARB Regular     Hemal Gant MS, RN, CDE  Glycemic Control Team  [phone]  Pager [phone] (M-TH 8:00-4:30P)  *After Hours pager [phone]

## 2019-02-25 NOTE — ROUTINE PROCESS
TRANSFER - IN REPORT:    Verbal report received from Shelah Duverney RN(name) on Gemma Bryson  being received from [employer] RN (unit) for routine progression of care      Report consisted of patients Situation, Background, Assessment and   Recommendations(SBAR). Information from the following report(s) SBAR, Kardex and ED Summary was reviewed with the receiving nurse. Opportunity for questions and clarification was provided. Assessment completed upon patients arrival to unit and care assumed.

## 2019-02-25 NOTE — DIABETES MGMT
GLYCEMIC CONTROL PROGRESS NOTE:    - known h/o T2DM, HbA1c within recommended range for age + comorbids on oral + basal home regimen  Noted:  - pt with severe hypoglycemic episode on AM labs 46 mg/dL  - BG treated to 80 mg/dL, recommend recheck POCT to ensure no further hypoglycemia at this time  - RN on floor notified, per RN pr has ordered breakfast  Addenum:  - recommend decrease Lantus to 20 units this AM (orders entered with permission from Dr. Jacklyn Genao)    Recent Glucose Results:   Lab Results   Component Value Date/Time    GLU 46 (LL) 02/25/2019 05:50 AM    GLU 97 02/24/2019 01:05 PM    GLUCPOC 80 02/25/2019 06:25 AM    GLUCPOC 48 (LL) 02/25/2019 06:08 AM    GLUCPOC 52 (LL) 02/25/2019 06:06 AM     Jeremie Burciaga MS, RN, CDE  Glycemic Control Team  [phone]  Pager [phone] (M-TH 8:00-4:30P)  *After Hours pager [phone]

## 2019-02-25 NOTE — DIABETES MGMT
NUTRITIONAL ASSESSMENT GLYCEMIC CONTROL/ PLAN OF CARE     Annette Tsang           68 y.o.           2/24/2019                 1. Acute encephalopathy    2. Urinary tract infection without hematuria, site unspecified    3. Dehydration      PMHx: Type 2 Diabetes, COPD, Asthma, HTN, Hyperlipidemia, MI, Neuropathy, Sciatica, Seizure, Stroke     INTERVENTIONS/PLAN:   -Recommend continuing Diabetic Consistent Carbohydrate Diet  -Provided Diabetes Self-Mgt Education    ASSESSMENT:   Nutritional Status:  Obese class I per BMI 34 kg/m2 (30.0-34.9 kg/m2)     Diabetes Management:       Recent Glucose Results:   Lab Results   Component Value Date/Time    GLU 46 (LL) 02/25/2019 05:50 AM    GLU 97 02/24/2019 01:05 PM    GLUCPOC 80 02/25/2019 06:25 AM    GLUCPOC 48 (LL) 02/25/2019 06:08 AM    GLUCPOC 52 (LL) 02/25/2019 06:06 AM       Within target range (non-ICU: <140; ICU<180): [] Yes   [x]  No    Current Insulin regimen:   Lantus 20 units  Humalog, corrective, normal insulin sensitivity     Home medication/insulin regimen:   Metformin 850 mg 3 x daily  Lantus 30 unit  glimepiride 4 mg 1x daily     HbA1c: (2/24/19) 7.5% equivalent to average blood glucose level 169 mg/dL. Adequate glycemic control PTA:  [] Yes  [x] No       SUBJECTIVE/OBJECTIVE:   Information obtained from: Pt states she had a good appetite this morning and was able to eat a full breakfast. Reports  variable appetite over the last 2 months. Last BM today, but reports some difficulty w/ constipation. Encouraged pt to vocalize issue w/ constipation to RN if constipation continues. Does not typically have issues w/ constipation. Pt has low blood sugar this morning of  46 mg/dL. Treated to 80 mg/DL w/ 4 oz of orange juice. Pt did not received lantus last evening. Unclear as to why hypoglycemia occurred. Likely related to poor po intake at dinner. Lantus dose decreased form 30 units to 20 units. Cl low. Pt on 0.9% NaCl 100 ml/hr infusion continuous.   Corrected Ca 8.56 mg/dL. WNL. Will continue to monitor closely and follow up per policy     Diet: DIET DIABETIC CONSISTENT CARB    Patient Vitals for the past 100 hrs:   % Diet Eaten   02/24/19 2100 100 %       Medications: [x]                Reviewed     Most Recent POC Glucose:   Recent Labs     02/25/19  0550 02/24/19  1305   GLU 46* 97         Labs:   Lab Results   Component Value Date/Time    Hemoglobin A1c 7.5 (H) 02/24/2019 07:36 PM     Lab Results   Component Value Date/Time    Sodium 143 02/25/2019 05:50 AM    Potassium 4.1 02/25/2019 05:50 AM    Chloride 110 (H) 02/25/2019 05:50 AM    CO2 27 02/25/2019 05:50 AM    Anion gap 6 02/25/2019 05:50 AM    Glucose 46 (LL) 02/25/2019 05:50 AM    BUN 14 02/25/2019 05:50 AM    Creatinine 0.85 02/25/2019 05:50 AM    Calcium 7.6 (L) 02/25/2019 05:50 AM    Magnesium 1.7 02/25/2019 05:50 AM    Phosphorus 3.0 02/25/2019 05:50 AM    Albumin 2.8 (L) 02/25/2019 05:50 AM       Anthropometrics:  BMI (calculated): 34 kg/m2  Wt Readings from Last 1 Encounters:   02/24/19 89.8 kg (198 lb)      Ht Readings from Last 1 Encounters:   02/24/19 5' 4\" (1.626 m)       Estimated Nutrition Needs: 1674 kcal/day (MSJ x 1.2 ), 63 g PRO/day ( 1g PRO/kg), 1674mL fluid/day (1 mL fluid/ kcal)  Based on:   [x]          Actual BW for energy and fluid needs: 89.9 kg    [x]            Adjusted BW for protein needs: 63.41 kg    Nutrition Diagnoses: Altered nutrition-related lab value related to less stringent Diabetes self Mgt as evidenced by Hgb A1C 9.3%. Nutrition Interventions:  -Recommend continuing Diabetic Consistent Carbohydrate Diet    Goal:   Blood glucose will be within target range of  mg/dL by 2/28/19.      Nutrition Monitoring and Evaluation      []     Monitor po intake on meal rounds  [x]     Continue inpatient monitoring and intervention  []     Other:      Nutrition Risk:  []   High     [x]  Moderate    []  Minimal/Uncompromised    Cortney Flatter  pgr [phone]

## 2019-02-25 NOTE — PROGRESS NOTES
Pharmacy Dosing Services: Warfarin    Consult for Warfarin Dosing by Pharmacy by Dr. Nidhi Paul provided for this 68 y.o.  female , for indication of Atrial Fibrillation. Day of Therapy (continued from home regimen)  Dose to achieve an INR goal of 2-3    Order entered to *HOLD* Warfarin today at 18:00, due to a supra-therapeutic INR. Significant drug interactions: Levofloxacin    LABS    PT/INR Lab Results   Component Value Date/Time    INR 3.7 (H) 02/25/2019 05:50 AM      Platelets Lab Results   Component Value Date/Time    PLATELET 766 69/58/6206 05:50 AM      H/H     Lab Results   Component Value Date/Time    HGB 9.8 (L) 02/25/2019 05:50 AM        Warfarin Administrations (last 168 hours)       None          Pharmacy to follow daily and will provide subsequent Warfarin dosing based on clinical status. Jose C Chang, Pharm. D.   Clinical Pharmacist  [phone]

## 2019-02-25 NOTE — PROGRESS NOTES
2100- Health choice meal offered to the Pt. Pt consumed 100%  2151- PRN Tylenol administered for headache  1359- Pt still complaining of headache. Also reported nausea. Dr Aditi Smith paged. New orders received. 0424- Pt resting in bed, no needs voiced. 0606-Blood sugar checked, 52, recheck 48. Pt offered 4 ounces of orange juice, will recheck in 15 mins.

## 2019-02-25 NOTE — ROUTINE PROCESS
Bedside and Verbal shift change report given to 1726 Ladarius Grewal (oncoming nurse) by Traci Maynard RN (offgoing nurse). Report included the following information SBAR and Kardex.

## 2019-02-25 NOTE — DIABETES MGMT
Diabetes Patient/Family Education Record  Factors That  May Influence Patients Ability  to Learn or  Comply with Recommendations   []   Language barrier    []   Cultural needs   []   Motivation    []   Cognitive limitation    []   Physical   []   Education    []   Physiological factors   []   Hearing/vision/speaking impairment   []   Sabianist beliefs    []   Financial factors   []  Other:   [x]  No factors identified at this time. Person Instructed:   [x]   Patient   []   Family   []  Other     Preference for Learning:   [x]   Verbal   []   Written   []  Demonstration     Level of Comprehension & Competence:    []  Good                                      [x] Fair                                     []  Poor                             []  Needs Reinforcement   [x]  Teachback completed    Education Component:   [x]  Medication management, including confirmation of home regimen    []  Nutritional management -obtain usual meal pattern   []  Exercise   []  Signs, symptoms, and treatment of hyperglycemia and hypoglycemia   [] Prevention, recognition and treatment of hyperglycemia and hypoglycemia   [x]  Importance of blood glucose monitoring    []  Instruction on use of the blood glucose meter   [x]  Discuss the importance of HbA1C monitoring    []  Sick day guidelines   []  Proper use and disposal of lancets, needles, syringes or insulin pens (if appropriate)   []  Potential long-term complications (retinopathy, kidney disease, neuropathy, foot care)   [] Information about whom to contact in case of emergency or for more information    [x]  Goal:  Patient/family will demonstrate understanding of Diabetes Self Management Skills by: 4/30  Plan for post-discharge education or self-management support:    [] Outpatient class schedule provided            [] Patient Declined    [] Scheduled for outpatient classes (date) _______  Verify:  Does patient understand how diabetes medications work? yes____________________________  Does patient know what their most recent A1c is? yes ___________________________________  Does patient monitor glucose at home? yes___________________________________________  Does patient have difficulty obtaining diabetes medications or testing supplies?  _pt does not have difficulty obtaining supplies_______________     Toy Reji MS, RN, CDE  Glycemic Control Team  [phone]  Pager [phone] (- 8:00-4:30P)  *After Hours pager [phone]

## 2019-02-25 NOTE — PROGRESS NOTES
Pharmacy Dosing Services: Warfarin    Consult for Warfarin Dosing by Pharmacy by Dr. Parish Martines provided for this 68 y.o.  female , for indication of Atrial Fibrillation. Day of Therapy (continued from home regimen)  Dose to achieve an INR goal of 2-3    Warfarin dose HELD on 2/24/19 due to supra-therapeutic INR    Significant drug interactions: levofloxacin  LABS    PT/INR Lab Results   Component Value Date/Time    INR 3.7 (H) 02/24/2019 01:05 PM      Platelets Lab Results   Component Value Date/Time    PLATELET 684 03/06/7199 01:05 PM      H/H     Lab Results   Component Value Date/Time    HGB 11.6 (L) 02/24/2019 01:05 PM        Warfarin Administrations (last 168 hours)       None          Pharmacy to follow daily and will provide subsequent Warfarin dosing based on clinical status.   HANNAH Finley  Contact information [phone]

## 2019-02-26 VITALS
HEART RATE: 62 BPM | WEIGHT: 221.56 LBS | HEIGHT: 64 IN | RESPIRATION RATE: 16 BRPM | OXYGEN SATURATION: 95 % | TEMPERATURE: 99.2 F | BODY MASS INDEX: 37.83 KG/M2 | DIASTOLIC BLOOD PRESSURE: 56 MMHG | SYSTOLIC BLOOD PRESSURE: 132 MMHG

## 2019-02-26 LAB
ANION GAP SERPL CALC-SCNC: 6 MMOL/L (ref 3–18)
BACTERIA SPEC CULT: ABNORMAL
BUN SERPL-MCNC: 9 MG/DL (ref 7–18)
BUN/CREAT SERPL: 12 (ref 12–20)
CALCIUM SERPL-MCNC: 7.6 MG/DL (ref 8.5–10.1)
CHLORIDE SERPL-SCNC: 109 MMOL/L (ref 100–108)
CO2 SERPL-SCNC: 24 MMOL/L (ref 21–32)
CREAT SERPL-MCNC: 0.73 MG/DL (ref 0.6–1.3)
GLUCOSE BLD STRIP.AUTO-MCNC: 257 MG/DL (ref 70–110)
GLUCOSE BLD STRIP.AUTO-MCNC: 70 MG/DL (ref 70–110)
GLUCOSE BLD STRIP.AUTO-MCNC: 75 MG/DL (ref 70–110)
GLUCOSE BLD STRIP.AUTO-MCNC: 91 MG/DL (ref 70–110)
GLUCOSE SERPL-MCNC: 86 MG/DL (ref 74–99)
INR PPP: 2.8 (ref 0.8–1.2)
POTASSIUM SERPL-SCNC: 4 MMOL/L (ref 3.5–5.5)
PROTHROMBIN TIME: 29.8 SEC (ref 11.5–15.2)
SERVICE CMNT-IMP: ABNORMAL
SODIUM SERPL-SCNC: 139 MMOL/L (ref 136–145)

## 2019-02-26 PROCEDURE — 74011636637 HC RX REV CODE- 636/637: Performed by: HOSPITALIST

## 2019-02-26 PROCEDURE — 74011250637 HC RX REV CODE- 250/637: Performed by: INTERNAL MEDICINE

## 2019-02-26 PROCEDURE — 36415 COLL VENOUS BLD VENIPUNCTURE: CPT

## 2019-02-26 PROCEDURE — 80048 BASIC METABOLIC PNL TOTAL CA: CPT

## 2019-02-26 PROCEDURE — 74011250637 HC RX REV CODE- 250/637: Performed by: HOSPITALIST

## 2019-02-26 PROCEDURE — 74011250636 HC RX REV CODE- 250/636: Performed by: EMERGENCY MEDICINE

## 2019-02-26 PROCEDURE — 85610 PROTHROMBIN TIME: CPT

## 2019-02-26 PROCEDURE — 77010033678 HC OXYGEN DAILY

## 2019-02-26 PROCEDURE — 74011636637 HC RX REV CODE- 636/637: Performed by: INTERNAL MEDICINE

## 2019-02-26 PROCEDURE — 74011250636 HC RX REV CODE- 250/636: Performed by: HOSPITALIST

## 2019-02-26 PROCEDURE — 82962 GLUCOSE BLOOD TEST: CPT

## 2019-02-26 PROCEDURE — 74011000250 HC RX REV CODE- 250: Performed by: EMERGENCY MEDICINE

## 2019-02-26 RX ORDER — CEPHALEXIN 500 MG/1
500 CAPSULE ORAL 3 TIMES DAILY
Qty: 21 CAP | Refills: 0 | Status: SHIPPED | OUTPATIENT
Start: 2019-02-26 | End: 2021-08-12

## 2019-02-26 RX ADMIN — GABAPENTIN 300 MG: 300 CAPSULE ORAL at 08:02

## 2019-02-26 RX ADMIN — CEFEPIME 2 G: 2 INJECTION, POWDER, FOR SOLUTION INTRAVENOUS at 05:41

## 2019-02-26 RX ADMIN — VANCOMYCIN HYDROCHLORIDE 1250 MG: 10 INJECTION, POWDER, LYOPHILIZED, FOR SOLUTION INTRAVENOUS at 08:02

## 2019-02-26 RX ADMIN — ATENOLOL 200 MG: 50 TABLET ORAL at 08:01

## 2019-02-26 RX ADMIN — UMECLIDINIUM 1 PUFF: 62.5 AEROSOL, POWDER ORAL at 09:38

## 2019-02-26 RX ADMIN — FLUTICASONE FUROATE AND VILANTEROL TRIFENATATE 1 PUFF: 200; 25 POWDER RESPIRATORY (INHALATION) at 09:38

## 2019-02-26 RX ADMIN — ASPIRIN 81 MG 81 MG: 81 TABLET ORAL at 08:01

## 2019-02-26 RX ADMIN — INSULIN LISPRO 6 UNITS: 100 INJECTION, SOLUTION INTRAVENOUS; SUBCUTANEOUS at 12:45

## 2019-02-26 RX ADMIN — INSULIN GLARGINE 20 UNITS: 100 INJECTION, SOLUTION SUBCUTANEOUS at 09:37

## 2019-02-26 NOTE — PROGRESS NOTES
8080 Assumed responsibility for patient from [address]    Dual AVS reviewed with Keyanna Lunsford RN. All medications reviewed individually with patient. Opportunities for questions and concerns provided. Patient discharged via (mode of transport ie. Car, ambulance or air transport) car. Patient's arm band appropriately discarded.

## 2019-02-26 NOTE — PROGRESS NOTES
Transition of care: anticipate d/c home today  Met with IDR team. States her  will pick her up after lunch. She denies needs she has medicare for insurance has pcp. No DME needs and no needs from cm  Care Management Interventions  PCP Verified by CM:  Yes  Palliative Care Criteria Met (RRAT>21 & CHF Dx)?: No  Transition of Care Consult (CM Consult): Discharge Planning  Current Support Network: Lives with Spouse  Confirm Follow Up Transport: Family  Plan discussed with Pt/Family/Caregiver: Yes  Freedom of Choice Offered: Yes  Discharge Location  Discharge Placement: Home with family assistance

## 2019-02-26 NOTE — ROUTINE PROCESS
1915-Assumed patient care from off going nurse Clarice Fisher RN. Patient resting quietly in bed, NAD noted, and call light is within reach. 2240- Patient stated that she normally takes Gabapentin for neuropathy and that her legs are bothering her. Also called pharmacy to inquire about spiriva respimat that is to be given, pharmacy stated they only carry the regular spiriva. MD Primo Lima notified and order given for 300mg Gabapentin two times daily and gave okay for patient to have pharmacy supplied spiriva.    0723-Patient resting quietly in bed, NAD noted, and call light is within reach. Bedside and Verbal shift change report given to Quinn Abdi RN (oncoming nurse) by KEHINDE Deluca RN (offgoing nurse). Report included the following information SBAR, Kardex, MAR, Accordion and Cardiac Rhythm Sinus Ashley [place].

## 2019-02-26 NOTE — DISCHARGE INSTRUCTIONS
Patient Education        Dehydration: Care Instructions  Your Care Instructions  Dehydration happens when your body loses too much fluid. This might happen when you do not drink enough water or you lose large amounts of fluids from your body because of diarrhea, vomiting, or sweating. Severe dehydration can be life-threatening. Water and minerals called electrolytes help put your body fluids back in balance. Learn the early signs of fluid loss, and drink more fluids to prevent dehydration. Follow-up care is a key part of your treatment and safety. Be sure to make and go to all appointments, and call your doctor if you are having problems. It's also a good idea to know your test results and keep a list of the medicines you take. How can you care for yourself at home? · To prevent dehydration, drink plenty of fluids, enough so that your urine is light yellow or clear like water. Choose water and other caffeine-free clear liquids until you feel better. If you have kidney, heart, or liver disease and have to limit fluids, talk with your doctor before you increase the amount of fluids you drink. · If you do not feel like eating or drinking, try taking small sips of water, sports drinks, or other rehydration drinks. · Get plenty of rest.  To prevent dehydration  · Add more fluids to your diet and daily routine, unless your doctor has told you not to. · During hot weather, drink more fluids. Drink even more fluids if you exercise a lot. Stay away from drinks with alcohol or caffeine. · Watch for the symptoms of dehydration. These include:  ? A dry, sticky mouth. ? Dark yellow urine, and not much of it. ? Dry and sunken eyes. ? Feeling very tired. · Learn what problems can lead to dehydration. These include:  ? Diarrhea, fever, and vomiting. ? Any illness with a fever, such as pneumonia or the flu. ?  Activities that cause heavy sweating, such as endurance races and heavy outdoor work in hot or humid weather. ? Alcohol or drug abuse or withdrawal.  ? Certain medicines, such as cold and allergy pills (antihistamines), diet pills (diuretics), and laxatives. ? Certain diseases, such as diabetes, cancer, and heart or kidney disease. When should you call for help? Call 911 anytime you think you may need emergency care. For example, call if:    · You passed out (lost consciousness).    Call your doctor now or seek immediate medical care if:    · You are confused and cannot think clearly.     · You are dizzy or lightheaded, or you feel like you may faint.     · You have signs of needing more fluids. You have sunken eyes and a dry mouth, and you pass only a little dark urine.     · You cannot keep fluids down.    Watch closely for changes in your health, and be sure to contact your doctor if:    · You are not making tears.     · Your skin is very dry and sags slowly back into place after you pinch it.     · Your mouth and eyes are very dry. Where can you learn more? Go to http://joseph-khalida.info/. Enter W179 in the search box to learn more about \"Dehydration: Care Instructions. \"  Current as of: September 23, 2018  Content Version: 11.9  © 4840-9132 [employer]. Care instructions adapted under license by [employer] (which disclaims liability or warranty for this information). If you have questions about a medical condition or this instruction, always ask your healthcare professional. [place] [address] any warranty or liability for your use of this information. Patient Education          Patient Education        Urinary Tract Infection in Women: Care Instructions  Your Care Instructions    A urinary tract infection, or UTI, is a general term for an infection anywhere between the kidneys and the urethra (where urine comes out). Most UTIs are bladder infections. They often cause pain or burning when you urinate.   UTIs are caused by bacteria and can be cured with antibiotics. Be sure to complete your treatment so that the infection goes away. Follow-up care is a key part of your treatment and safety. Be sure to make and go to all appointments, and call your doctor if you are having problems. It's also a good idea to know your test results and keep a list of the medicines you take. How can you care for yourself at home? · Take your antibiotics as directed. Do not stop taking them just because you feel better. You need to take the full course of antibiotics. · Drink extra water and other fluids for the next day or two. This may help wash out the bacteria that are causing the infection. (If you have kidney, heart, or liver disease and have to limit fluids, talk with your doctor before you increase your fluid intake.)  · Avoid drinks that are carbonated or have caffeine. They can irritate the bladder. · Urinate often. Try to empty your bladder each time. · To relieve pain, take a hot bath or lay a heating pad set on low over your lower belly or genital area. Never go to sleep with a heating pad in place. To prevent UTIs  · Drink plenty of water each day. This helps you urinate often, which clears bacteria from your system. (If you have kidney, heart, or liver disease and have to limit fluids, talk with your doctor before you increase your fluid intake.)  · Urinate when you need to. · Urinate right after you have sex. · Change sanitary pads often. · Avoid douches, bubble baths, feminine hygiene sprays, and other feminine hygiene products that have deodorants. · After going to the bathroom, wipe from front to back. When should you call for help? Call your doctor now or seek immediate medical care if:    · Symptoms such as fever, chills, nausea, or vomiting get worse or appear for the first time.     · You have new pain in your back just below your rib cage.  This is called flank pain.     · There is new blood or pus in your urine.     · You have any problems with your antibiotic medicine.    Watch closely for changes in your health, and be sure to contact your doctor if:    · You are not getting better after taking an antibiotic for 2 days.     · Your symptoms go away but then come back. Where can you learn more? Go to http://joseph-khalida.info/. Enter N075 in the search box to learn more about \"Urinary Tract Infection in Women: Care Instructions. \"  Current as of: March 20, 2018  Content Version: 11.9  © 2981-2100 [employer]. Care instructions adapted under license by [employer] (which disclaims liability or warranty for this information). If you have questions about a medical condition or this instruction, always ask your healthcare professional. [address] any warranty or liability for your use of this information.

## 2019-02-26 NOTE — PROGRESS NOTES
Problem: Pressure Injury - Risk of  Goal: *Prevention of pressure injury  Document Ryne Scale and appropriate interventions in the flowsheet. Outcome: Progressing Towards Goal  Pressure Injury Interventions: Activity Interventions: Increase time out of bed, Pressure redistribution bed/mattress(bed type)    Mobility Interventions: HOB 30 degrees or less, Pressure redistribution bed/mattress (bed type)    Nutrition Interventions: Document food/fluid/supplement intake    Friction and Shear Interventions: HOB 30 degrees or less, Minimize layers               Problem: Falls - Risk of  Goal: *Absence of Falls  Document Satinder Fall Risk and appropriate interventions in the flowsheet.   Outcome: Progressing Towards Goal  Fall Risk Interventions:       Mentation Interventions: Door open when patient unattended, More frequent rounding, Room close to nurse's station, Toileting rounds    Medication Interventions: Patient to call before getting OOB, Teach patient to arise slowly    Elimination Interventions: Call light in reach, Patient to call for help with toileting needs, Toileting schedule/hourly rounds    History of Falls Interventions: Door open when patient unattended

## 2019-02-26 NOTE — PROGRESS NOTES
Hospitalist Progress Note    Patient: Esteban Mendosa MRN: [id number]  CSN: [id number]    YOB: 1945  Age: 68 y.o. Sex: female    DOA: 2/24/2019 LOS:  LOS: 1 day                Assessment/Plan     Patient Active Problem List   Diagnosis Code    HTN (hypertension), benign I10    Asthma J45.909    Neuropathy G62.9    Hyperlipidemia E78.5    Dizziness R42    Chest pain in adult R07.9    COPD (chronic obstructive pulmonary disease) ([facility] [address] J44.9    TIA (transient ischemic attack) G45.9    Expressive aphasia R47.01    CVA (cerebral vascular accident) ([facility] [address] I63.9    Subtherapeutic anticoagulation Z51.81, Z79.01    Dehydration E86.0    UTI (urinary tract infection) N39.0    Acute encephalopathy G80.41            69 yo female admitted for AMs    History of CVA in the past.    Denies any complains     Encephalopathy - likely secondary to UTI. Improving. History of CVA - continue with anticoagulation  Follow MRI    UTI - continue with cephalosporin, levaquin  Urine cultures growing GNR    Bacteremia - blood cultures growing GPC, added vancomycin. Repeat blood cultures. DM - had low blood sugar this morning, decrease lantus. HTN - relatively controlled. Disposition : 1-2 days    Review of systems  General: No fevers or chills. Cardiovascular: No chest pain or pressure. No palpitations. Pulmonary: No shortness of breath. Gastrointestinal: No nausea, vomiting. Physical Exam:  General: Awake, cooperative, no acute distress    HEENT: NC, Atraumatic. PERRLA, anicteric sclerae. Lungs: CTA Bilaterally. No Wheezing/Rhonchi/Rales. Heart:  S1 S2,  No murmur, No Rubs, No Gallops  Abdomen: Soft, Non distended, Non tender.  +Bowel sounds,   Extremities: No c/c/e  Psych:   Not anxious or agitated. Neurologic:  No acute neurological deficit.  Residual expressive aphasia        Vital signs/Intake and Output:  Visit Vitals  /59 (BP 1 Location: Left arm, BP Patient Position: At rest)   Pulse (!) 55   Temp 97.9 °F (36.6 °C)   Resp 16   Ht 5' 4\" (1.626 m)   Wt 89.8 kg (198 lb)   SpO2 100%   BMI 33.99 kg/m²     Current Shift:  02/25 1901 - 02/26 0700  In: -   Out: 400 [Urine:400]  Last three shifts:  02/24 0701 - 02/25 1900  In: 2075 [P.O.:1080; I.V.:995]  Out: 900 [Urine:900]            Labs: Results:       Chemistry Recent Labs     02/25/19  0550 02/24/19  1305   GLU 46* 97    142   K 4.1 4.2   * 107   CO2 27 28   BUN 14 18   CREA 0.85 1.10   CA 7.6* 8.6   AGAP 6 7   BUCR 16 16   AP 82 100   TP 6.3* 7.1   ALB 2.8* 3.4   GLOB 3.5 3.7   AGRAT 0.8 0.9      CBC w/Diff Recent Labs     02/25/19  0550 02/24/19  1305   WBC 4.5* 6.0   RBC 3.69* 4.28   HGB 9.8* 11.6*   HCT 31.7* 37.2    258   GRANS 42 47   LYMPH 40 37   EOS 9* 10*      Cardiac Enzymes Recent Labs     02/24/19  1305   CPK 81   CKND1 1.7      Coagulation Recent Labs     02/25/19  0550 02/24/19  1305   PTP 37.5* 37.1*   INR 3.7* 3.7*       Lipid Panel Lab Results   Component Value Date/Time    Cholesterol, total 127 07/28/2018 06:06 AM    HDL Cholesterol 35 (L) 07/28/2018 06:06 AM    LDL, calculated 61.6 07/28/2018 06:06 AM    VLDL, calculated 30.4 07/28/2018 06:06 AM    Triglyceride 152 (H) 07/28/2018 06:06 AM    CHOL/HDL Ratio 3.6 07/28/2018 06:06 AM      BNP No results for input(s): BNPP in the last 72 hours.    Liver Enzymes Recent Labs     02/25/19  0550   TP 6.3*   ALB 2.8*   AP 82   SGOT 18      Thyroid Studies Lab Results   Component Value Date/Time    TSH 5.60 (H) 02/25/2019 05:50 AM        Procedures/imaging: see electronic medical records for all procedures/Xrays and details which were not copied into this note but were reviewed prior to creation of Plan

## 2019-02-26 NOTE — PROGRESS NOTES
Problem: Pressure Injury - Risk of  Goal: *Prevention of pressure injury  Document Ryne Scale and appropriate interventions in the flowsheet. Outcome: Progressing Towards Goal  Pressure Injury Interventions: Activity Interventions: Increase time out of bed    Mobility Interventions: Pressure redistribution bed/mattress (bed type)    Nutrition Interventions: Document food/fluid/supplement intake    Friction and Shear Interventions: HOB 30 degrees or less, Minimize layers               Problem: Falls - Risk of  Goal: *Absence of Falls  Document Satinder Fall Risk and appropriate interventions in the flowsheet.   Outcome: Progressing Towards Goal  Fall Risk Interventions:       Mentation Interventions: Door open when patient unattended, More frequent rounding, Room close to nurse's station, Toileting rounds    Medication Interventions: Patient to call before getting OOB    Elimination Interventions: Call light in reach    History of Falls Interventions: Door open when patient unattended

## 2019-02-26 NOTE — DISCHARGE SUMMARY
Discharge Summary    Patient: Ariel Zhang MRN: [id number]  CSN: [id number]    YOB: 1945  Age: 68 y.o. Sex: female    DOA: 2/24/2019 LOS:  LOS: 2 days   Discharge Date:      Primary Care Provider:  Bishop Leana MD    Admission Diagnoses: Acute encephalopathy [G93.40]  UTI (urinary tract infection) [N39.0]  Dehydration [E86.0]    Discharge Diagnoses:    Problem List as of 2/26/2019 Date Reviewed: 2/22/2017          Codes Class Noted - Resolved    Dehydration ICD-10-CM: E86.0  ICD-9-CM: 276.51  2/24/2019 - Present        UTI (urinary tract infection) ICD-10-CM: N39.0  ICD-9-CM: 599.0  2/24/2019 - Present        Acute encephalopathy ICD-10-CM: G93.40  ICD-9-CM: 348.30  2/24/2019 - Present        TIA (transient ischemic attack) ICD-10-CM: G45.9  ICD-9-CM: 435.9  7/27/2018 - Present        Expressive aphasia ICD-10-CM: R47.01  ICD-9-CM: 784.3  7/27/2018 - Present        CVA (cerebral vascular accident) [facility]) ICD-10-CM: I63.9  ICD-9-CM: 434.91  7/27/2018 - Present        Subtherapeutic anticoagulation ICD-10-CM: Z51.81, Z79.01  ICD-9-CM: V58.83, V58.61  7/27/2018 - Present        Chest pain in adult ICD-10-CM: R07.9  ICD-9-CM: 786.50  2/22/2017 - Present        COPD (chronic obstructive pulmonary disease) ([facility] [address] (Chronic) ICD-10-CM: J44.9  ICD-9-CM: 373  2/22/2017 - Present        Dizziness ICD-10-CM: R42  ICD-9-CM: 780.4  12/7/2015 - Present        HTN (hypertension), benign ICD-10-CM: I10  ICD-9-CM: 401.1  Unknown - Present        Asthma ICD-10-CM: J45.909  ICD-9-CM: 493.90  Unknown - Present        Neuropathy ICD-10-CM: G62.9  ICD-9-CM: 355.9  Unknown - Present        Hyperlipidemia ICD-10-CM: E78.5  ICD-9-CM: 272.4  Unknown - Present              Discharge Medications:     Current Discharge Medication List      START taking these medications    Details   cephALEXin (KEFLEX) 500 mg capsule Take 1 Cap by mouth three (3) times daily.   Qty: 21 Cap, Refills: 0         CONTINUE these medications which have NOT CHANGED    Details   simvastatin (ZOCOR) 20 mg tablet Take 20 mg by mouth nightly. budesonide-formoterol (SYMBICORT) 160-4.5 mcg/actuation HFAA Take 2 Puffs by inhalation two (2) times a day. magnesium oxide (MAG-OX) 400 mg tablet Take 400 mg by mouth. diphenhydrAMINE (BENADRYL) 25 mg capsule Take 25 mg by mouth daily as needed. levETIRAcetam (KEPPRA) 750 mg tablet Take 1,500 mg by mouth two (2) times a day. metFORMIN (GLUCOPHAGE) 850 mg tablet Take 1 Tab by mouth three (3) times daily. Qty: 30 Tab, Refills: 0      OXcarbazepine (TRILEPTAL) 300 mg tablet Take 300 mg by mouth two (2) times a day. atorvastatin (LIPITOR) 40 mg tablet Take 1 Tab by mouth nightly. Qty: 30 Tab, Refills: 0      warfarin (COUMADIN) 5 mg tablet Take 5 mg by mouth five (5) days a week. TAKE COUMADIN 7.5 MG X 2 DAYS THEN, TAKE COUMADIN 5 MG DAILY NEXT PROTIME WILL BE ON 12/7/17 AT DR. Mortimer Harrison OFFICE  Indications: DEEP VEIN THROMBOSIS PREVENTION      atenolol (TENORMIN) 100 mg tablet Take 200 mg by mouth daily. !! gabapentin (NEURONTIN) 300 mg capsule Take 300 mg by mouth daily. 300mg am and 600mg pm      !! gabapentin (NEURONTIN) 300 mg capsule Take 600 mg by mouth every evening. losartan-hydroCHLOROthiazide (HYZAAR) 100-12.5 mg per tablet Take 1 Tab by mouth daily. insulin glargine (LANTUS) 100 unit/mL injection 30 Units by SubCUTAneous route daily. glimepiride (AMARYL) 4 mg tablet Take 4 mg by mouth every morning. aspirin 81 mg chewable tablet Take 1 Tab by mouth daily. Qty: 30 Tab, Refills: 0      tiotropium bromide (SPIRIVA RESPIMAT) 2.5 mcg/actuation mist Take 1 Puff by inhalation two (2) times a day. divalproex ER (DEPAKOTE ER) 500 mg ER tablet Take 1 Tab by mouth two (2) times a day. Qty: 60 Tab, Refills: 0      cyanocobalamin 1,000 mcg tablet Take 2,000 mcg by mouth daily. !! - Potential duplicate medications found. Please discuss with provider. STOP taking these medications       nortriptyline (PAMELOR) 25 mg capsule Comments:   Reason for Stopping:         mometasone-formoterol (DULERA) 200-5 mcg/actuation HFA inhaler Comments:   Reason for Stopping:         montelukast (SINGULAIR) 10 mg tablet Comments:   Reason for Stopping:               Discharge Condition: Good    Procedures : None    Consults: None      PHYSICAL EXAM   Visit Vitals  /69 (BP 1 Location: Left arm, BP Patient Position: At rest;Sitting)   Pulse (!) 56   Temp 98.1 °F (36.7 °C)   Resp 16   Ht 5' 4\" (1.626 m)   Wt 100.5 kg (221 lb 9 oz)   SpO2 100%   BMI 38.03 kg/m²     General: Awake, cooperative, no acute distress    HEENT: NC, Atraumatic. PERRLA, EOMI. Anicteric sclerae. Lungs:  CTA Bilaterally. No Wheezing/Rhonchi/Rales. Heart:  Regular  rhythm,  No murmur, No Rubs, No Gallops  Abdomen: Soft, Non distended, Non tender. +Bowel sounds,   Extremities: No c/c/e  Psych:   Not anxious or agitated. Neurologic:  No acute neurological deficits. Admission HPI :     Rodolfo Pereyra is a 68 y.o. female being admitted to the hospital with  AMS/ encephalopathy. She was brought to Er after haivng increasing confusion dn dysartherai since last 2-3 days. In Er, she was found to have marked UTI and dehydration. Her  is present at bedside and is able to provide most of history. Hospital Course :     Admitted. Diagnosed with uti and confusion. Treated with Cefepime, levofloxacin and vancomycin. Urine culture with e.coli sensitive to all antibiotics. Had MRI brain. No stroke or mass. Feeling well today. [place] to get home. Activity: Activity as tolerated    Diet: Regular Diet    Follow-up: F/u with pcp in one week.     Disposition: Home    Minutes spent on discharge: 45      Labs: Results:       Chemistry Recent Labs     02/26/19  0126 02/25/19  0550 02/24/19  1305   GLU 86 46* 97    143 142   K 4.0 4.1 4.2   * 110* 107   CO2 24 27 28   BUN 9 14 18   CREA 0.73 0.85 1.10   CA 7.6* 7.6* 8.6   AGAP 6 6 7   BUCR 12 16 16   AP  --  82 100   TP  --  6.3* 7.1   ALB  --  2.8* 3.4   GLOB  --  3.5 3.7   AGRAT  --  0.8 0.9      CBC w/Diff Recent Labs     02/25/19  0550 02/24/19  1305   WBC 4.5* 6.0   RBC 3.69* 4.28   HGB 9.8* 11.6*   HCT 31.7* 37.2    258   GRANS 42 47   LYMPH 40 37   EOS 9* 10*      Cardiac Enzymes Recent Labs     02/24/19  1305   CPK 81   CKND1 1.7      Coagulation Recent Labs     02/26/19  0126 02/25/19  0550   PTP 29.8* 37.5*   INR 2.8* 3.7*       Lipid Panel Lab Results   Component Value Date/Time    Cholesterol, total 127 07/28/2018 06:06 AM    HDL Cholesterol 35 (L) 07/28/2018 06:06 AM    LDL, calculated 61.6 07/28/2018 06:06 AM    VLDL, calculated 30.4 07/28/2018 06:06 AM    Triglyceride 152 (H) 07/28/2018 06:06 AM    CHOL/HDL Ratio 3.6 07/28/2018 06:06 AM      BNP No results for input(s): BNPP in the last 72 hours. Liver Enzymes Recent Labs     02/25/19  0550   TP 6.3*   ALB 2.8*   AP 82   SGOT 18      Thyroid Studies Lab Results   Component Value Date/Time    TSH 5.60 (H) 02/25/2019 05:50 AM            Significant Diagnostic Studies: Mri Brain Wo Cont    Result Date: 2/25/2019  Brain MR without contrast: Indication: Decreased level of consciousness. Difficulty speaking. 3 day history of change in mental status. Recent abrupt inability to talk. Procedure: Sagittal spin echo T1, axial FSE FLAIR and T2 and axial diffusion weighted scanning was performed. An axial T2* scan optimized to detect hemosiderin and calcium was performed. Coronal spin echo T1and FSE T2 scans were also performed. No contrast was administered. Comparison exam: Head CT 2/24/2019. Brain MRI 7/27/2018 Findings: Diffusion: There are no areas of restricted diffusion, no acute infarction. The T2* scan shows no acute or chronic hemorrhage or abnormal calcifications.  Brain parenchyma: Patchy multifocal in part confluent signal abnormality in the periventricular deep and peripheral subcortical white matter of the hemispheres are noted on FLAIR and T2 similar to previous MRI. Mild ischemic changes in the corona radiata. No mass or mass effect. Ventricles and sulci: Normal, no significant brain atrophy. Extra axial: No extra axial fluid collection or mass. Brain vasculature: Normal, no arterial vascular abnormality is noted. 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 Craniocervical Junction: Normal. 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 Impression: 1. No acute hemorrhage or acute infarction. 2. White matter abnormality is nonspecific but likely ischemic in a much changed from previous. 3. No other significant intracranial abnormality is appreciated. Ct Head Wo Cont    Result Date: 2/24/2019  EXAM: CT head INDICATION: Altered mental status COMPARISON: MRI of the brain 7/28/2018; CT head 7/27/2018 TECHNIQUE: Axial CT imaging of the head was performed without intravenous contrast. One or more dose reduction techniques were used on this CT: automated exposure control, adjustment of the mAs and/or kVp according to patient size, and iterative reconstruction techniques. The specific techniques used on this CT exam have been documented in the patient's electronic medical record. Digital Imaging and Communications in Medicine (DICOM) format image data are available to nonaffiliated external healthcare facilities or entities on a secure, media free, reciprocally searchable basis with patient authorization for at least a 12-month period after this study. _______________ FINDINGS: BRAIN AND POSTERIOR FOSSA: Cortical and cerebellar volume loss is redemonstrated. Ventricular size and configuration is stable. Basilar cisterns remain patent. Subcortical and periventricular white matter low-attenuation without change.  Faint physiologic bilateral basal ganglia calcifications There is no intracranial hemorrhage, mass effect, or midline shift. Gray-white matter differentiation remains within normal limits. EXTRA-AXIAL SPACES AND MENINGES: There are no abnormal extra-axial fluid collections. CALVARIUM: Intact. SINUSES: Imaged paranasal sinuses and mastoid air cells are clear. OTHER: None. _______________     IMPRESSION: 1. No acute intracranial abnormality. Stable examination. 2. Subcortical and periventricular white matter hypoattenuation; unchanged and nonspecific, favored to reflect sequela of chronic ischemic microvascular change. Xr Chest Port    Result Date: 2/25/2019  EXAM: Portable chest CLINICAL INDICATION/HISTORY: -From Provider: Sepsis -Additional: None COMPARISON: None. TECHNIQUE: Portable chest x-ray _______________ FINDINGS: Support lines and tubes: None. The cardiomediastinal silhouette is normal.  The lungs are clear. Impression: ------------- No active cardiopulmonary disease. No results found for this or any previous visit.         CC: Andreina Peres MD

## 2019-02-26 NOTE — ROUTINE PROCESS
shift change report given Maverick Zimmer RN (oncoming nurse) by William Ny RN (offgoing nurse). Report included the following information SBAR, Kardex, Intake/Output and MAR.

## 2019-02-27 LAB
BACTERIA SPEC CULT: ABNORMAL
GRAM STN SPEC: ABNORMAL
GRAM STN SPEC: ABNORMAL
SERVICE CMNT-IMP: ABNORMAL

## 2019-04-17 NOTE — DISCHARGE INSTRUCTIONS
Sciatica: Care Instructions  Your Care Instructions    Sciatica (say \"vje-PB-au-kuh\") is an irritation of one of the sciatic nerves, which come from the spinal cord in the lower back. The sciatic nerves and their branches extend down through the buttock to the foot. Sciatica can develop when an injured disc in the back presses against a spinal nerve root. Its main symptom is pain, numbness, or weakness that is often worse in the leg or foot than in the back. Sciatica often will improve and go away with time. Early treatment usually includes medicines and exercises to relieve pain. Follow-up care is a key part of your treatment and safety. Be sure to make and go to all appointments, and call your doctor if you are having problems. It's also a good idea to know your test results and keep a list of the medicines you take. How can you care for yourself at home? · Take pain medicines exactly as directed. ¨ If the doctor gave you a prescription medicine for pain, take it as prescribed. ¨ If you are not taking a prescription pain medicine, ask your doctor if you can take an over-the-counter medicine. · Use heat or ice to relieve pain. ¨ To apply heat, put a warm water bottle, heating pad set on low, or warm cloth on your back. Do not go to sleep with a heating pad on your skin. ¨ To use ice, put ice or a cold pack on the area for 10 to 20 minutes at a time. Put a thin cloth between the ice and your skin. · Avoid sitting if possible, unless it feels better than standing. · Alternate lying down with short walks. Increase your walking distance as you are able to without making your symptoms worse. · Do not do anything that makes your symptoms worse. When should you call for help? Call 911 anytime you think you may need emergency care. For example, call if:  · You are unable to move a leg at all.   Call your doctor now or seek immediate medical care if:  · You have new or worse symptoms in your legs or buttocks. Symptoms may include:  ¨ Numbness or tingling. ¨ Weakness. ¨ Pain. · You lose bladder or bowel control. Watch closely for changes in your health, and be sure to contact your doctor if:  · You are not getting better as expected. Where can you learn more? Go to http://joseph-khalida.info/. Enter [phone] in the search box to learn more about \"Sciatica: Care Instructions. \"  Current as of: May 23, 2016  Content Version: 11.2  © 8412-0855 [employer]. Care instructions adapted under license by [employer] (which disclaims liability or warranty for this information). If you have questions about a medical condition or this instruction, always ask your healthcare professional. [address] any warranty or liability for your use of this information. Medical Necessity Clause: This procedure was medically necessary because the lesions that were treated were: Treatment Number (Optional): 3 Concentration Of Solution Injected (Mg/Ml): 5.0 Include Z78.9 (Other Specified Conditions Influencing Health Status) As An Associated Diagnosis?: No Total Volume Injected (Ccs- Only Use Numbers And Decimals): 1 X Size Of Lesion In Cm (Optional): 0 Consent: The risks of atrophy were reviewed with the patient. Kenalog Preparation: Kenalog Detail Level: Detailed Administered By (Optional): Dr Pavon

## 2019-04-21 LAB
ATRIAL RATE: 74 BPM
CALCULATED P AXIS, ECG09: 72 DEGREES
CALCULATED R AXIS, ECG10: 56 DEGREES
CALCULATED T AXIS, ECG11: 60 DEGREES
DIAGNOSIS, 93000: NORMAL
P-R INTERVAL, ECG05: 180 MS
Q-T INTERVAL, ECG07: 404 MS
QRS DURATION, ECG06: 84 MS
QTC CALCULATION (BEZET), ECG08: 448 MS
VENTRICULAR RATE, ECG03: 74 BPM

## 2020-06-08 ENCOUNTER — HOSPITAL ENCOUNTER (OUTPATIENT)
Dept: MRI IMAGING | Age: 75
Discharge: HOME | End: 2020-06-08
Attending: PHYSICIAN ASSISTANT
Payer: MEDICARE

## 2020-06-08 DIAGNOSIS — M54.17 LUMBOSACRAL NEURITIS: ICD-10-CM

## 2020-06-08 PROCEDURE — 72148 MRI LUMBAR SPINE W/O DYE: CPT

## 2020-07-14 ENCOUNTER — HOSPITAL ENCOUNTER (OUTPATIENT)
Dept: LAB | Age: 75
Discharge: HOME | End: 2020-07-14
Payer: MEDICARE

## 2020-07-14 PROCEDURE — 80183 DRUG SCRN QUANT OXCARBAZEPIN: CPT

## 2020-07-14 PROCEDURE — 36415 COLL VENOUS BLD VENIPUNCTURE: CPT

## 2020-07-14 PROCEDURE — 80177 DRUG SCRN QUAN LEVETIRACETAM: CPT

## 2020-07-16 LAB
FAX TO INFO,FAXT: NORMAL
FAX TO NUMBER,FAXN: NORMAL
LEVETIRACETAM SERPL-MCNC: 53.5 UG/ML (ref 10–40)
OXCARBAZEPINE SERPL-MCNC: 19 UG/ML (ref 10–35)

## 2021-08-12 ENCOUNTER — HOSPITAL ENCOUNTER (EMERGENCY)
Age: 76
Discharge: HOME | End: 2021-08-12
Attending: EMERGENCY MEDICINE
Payer: MEDICARE

## 2021-08-12 ENCOUNTER — APPOINTMENT (OUTPATIENT)
Dept: GENERAL RADIOLOGY | Age: 76
End: 2021-08-12
Attending: EMERGENCY MEDICINE
Payer: MEDICARE

## 2021-08-12 ENCOUNTER — APPOINTMENT (OUTPATIENT)
Dept: VASCULAR SURGERY | Age: 76
End: 2021-08-12
Attending: EMERGENCY MEDICINE
Payer: MEDICARE

## 2021-08-12 ENCOUNTER — APPOINTMENT (OUTPATIENT)
Dept: CT IMAGING | Age: 76
End: 2021-08-12
Attending: EMERGENCY MEDICINE
Payer: MEDICARE

## 2021-08-12 VITALS
SYSTOLIC BLOOD PRESSURE: 153 MMHG | HEIGHT: 64 IN | DIASTOLIC BLOOD PRESSURE: 57 MMHG | OXYGEN SATURATION: 100 % | RESPIRATION RATE: 10 BRPM | HEART RATE: 62 BPM | BODY MASS INDEX: 34.31 KG/M2 | WEIGHT: 201 LBS | TEMPERATURE: 98.7 F

## 2021-08-12 DIAGNOSIS — I82.441 ACUTE DEEP VEIN THROMBOSIS (DVT) OF TIBIAL VEIN OF RIGHT LOWER EXTREMITY (HCC): Primary | ICD-10-CM

## 2021-08-12 DIAGNOSIS — M79.605 BILATERAL LEG PAIN: ICD-10-CM

## 2021-08-12 DIAGNOSIS — R07.89 CHEST TIGHTNESS: ICD-10-CM

## 2021-08-12 DIAGNOSIS — J44.9 CHRONIC OBSTRUCTIVE PULMONARY DISEASE, UNSPECIFIED COPD TYPE (HCC): ICD-10-CM

## 2021-08-12 DIAGNOSIS — R06.02 SOB (SHORTNESS OF BREATH): ICD-10-CM

## 2021-08-12 DIAGNOSIS — M79.604 BILATERAL LEG PAIN: ICD-10-CM

## 2021-08-12 LAB
ALBUMIN SERPL-MCNC: 3.7 G/DL (ref 3.4–5)
ALBUMIN/GLOB SERPL: 0.9 {RATIO} (ref 0.8–1.7)
ALP SERPL-CCNC: 117 U/L (ref 45–117)
ALT SERPL-CCNC: 25 U/L (ref 13–56)
ANION GAP SERPL CALC-SCNC: 6 MMOL/L (ref 3–18)
APTT PPP: 25.6 SEC (ref 23–36.4)
AST SERPL-CCNC: 22 U/L (ref 10–38)
ATRIAL RATE: 73 BPM
BASOPHILS # BLD: 0.1 K/UL (ref 0–0.1)
BASOPHILS NFR BLD: 1 % (ref 0–2)
BILIRUB SERPL-MCNC: 0.6 MG/DL (ref 0.2–1)
BNP SERPL-MCNC: 536 PG/ML (ref 0–900)
BUN SERPL-MCNC: 14 MG/DL (ref 7–18)
BUN/CREAT SERPL: 18 (ref 12–20)
CALCIUM SERPL-MCNC: 9.1 MG/DL (ref 8.5–10.1)
CALCULATED P AXIS, ECG09: 65 DEGREES
CALCULATED R AXIS, ECG10: 75 DEGREES
CALCULATED T AXIS, ECG11: 58 DEGREES
CHLORIDE SERPL-SCNC: 104 MMOL/L (ref 100–111)
CK MB CFR SERPL CALC: 2.8 % (ref 0–4)
CK MB CFR SERPL CALC: 3.4 % (ref 0–4)
CK MB SERPL-MCNC: 2.2 NG/ML (ref 5–25)
CK MB SERPL-MCNC: 2.5 NG/ML (ref 5–25)
CK SERPL-CCNC: 74 U/L (ref 26–192)
CK SERPL-CCNC: 79 U/L (ref 26–192)
CO2 SERPL-SCNC: 27 MMOL/L (ref 21–32)
CREAT SERPL-MCNC: 0.79 MG/DL (ref 0.6–1.3)
D DIMER PPP FEU-MCNC: 0.54 UG/ML(FEU)
DIAGNOSIS, 93000: NORMAL
DIFFERENTIAL METHOD BLD: ABNORMAL
EOSINOPHIL # BLD: 0.3 K/UL (ref 0–0.4)
EOSINOPHIL NFR BLD: 5 % (ref 0–5)
ERYTHROCYTE [DISTWIDTH] IN BLOOD BY AUTOMATED COUNT: 17.9 % (ref 11.6–14.5)
GLOBULIN SER CALC-MCNC: 4 G/DL (ref 2–4)
GLUCOSE SERPL-MCNC: 212 MG/DL (ref 74–99)
HCT VFR BLD AUTO: 35.4 % (ref 35–45)
HGB BLD-MCNC: 11.2 G/DL (ref 12–16)
INR PPP: 0.9 (ref 0.8–1.2)
LYMPHOCYTES # BLD: 2 K/UL (ref 0.9–3.6)
LYMPHOCYTES NFR BLD: 36 % (ref 21–52)
MAGNESIUM SERPL-MCNC: 1.6 MG/DL (ref 1.6–2.6)
MCH RBC QN AUTO: 25.4 PG (ref 24–34)
MCHC RBC AUTO-ENTMCNC: 31.6 G/DL (ref 31–37)
MCV RBC AUTO: 80.3 FL (ref 74–97)
MONOCYTES # BLD: 0.5 K/UL (ref 0.05–1.2)
MONOCYTES NFR BLD: 8 % (ref 3–10)
NEUTS SEG # BLD: 2.7 K/UL (ref 1.8–8)
NEUTS SEG NFR BLD: 49 % (ref 40–73)
P-R INTERVAL, ECG05: 166 MS
PLATELET # BLD AUTO: 293 K/UL (ref 135–420)
PMV BLD AUTO: 10.6 FL (ref 9.2–11.8)
POTASSIUM SERPL-SCNC: 3.8 MMOL/L (ref 3.5–5.5)
PROT SERPL-MCNC: 7.7 G/DL (ref 6.4–8.2)
PROTHROMBIN TIME: 12.1 SEC (ref 11.5–15.2)
Q-T INTERVAL, ECG07: 424 MS
QRS DURATION, ECG06: 84 MS
QTC CALCULATION (BEZET), ECG08: 467 MS
RBC # BLD AUTO: 4.41 M/UL (ref 4.2–5.3)
SODIUM SERPL-SCNC: 137 MMOL/L (ref 136–145)
TROPONIN I SERPL-MCNC: <0.02 NG/ML (ref 0–0.04)
TROPONIN I SERPL-MCNC: <0.02 NG/ML (ref 0–0.04)
VENTRICULAR RATE, ECG03: 73 BPM
WBC # BLD AUTO: 5.5 K/UL (ref 4.6–13.2)

## 2021-08-12 PROCEDURE — 74011250637 HC RX REV CODE- 250/637: Performed by: EMERGENCY MEDICINE

## 2021-08-12 PROCEDURE — 85379 FIBRIN DEGRADATION QUANT: CPT

## 2021-08-12 PROCEDURE — 82550 ASSAY OF CK (CPK): CPT

## 2021-08-12 PROCEDURE — 85610 PROTHROMBIN TIME: CPT

## 2021-08-12 PROCEDURE — 80053 COMPREHEN METABOLIC PANEL: CPT

## 2021-08-12 PROCEDURE — 74011250636 HC RX REV CODE- 250/636: Performed by: EMERGENCY MEDICINE

## 2021-08-12 PROCEDURE — 94640 AIRWAY INHALATION TREATMENT: CPT

## 2021-08-12 PROCEDURE — 83880 ASSAY OF NATRIURETIC PEPTIDE: CPT

## 2021-08-12 PROCEDURE — 93970 EXTREMITY STUDY: CPT

## 2021-08-12 PROCEDURE — 96374 THER/PROPH/DIAG INJ IV PUSH: CPT

## 2021-08-12 PROCEDURE — 85025 COMPLETE CBC W/AUTO DIFF WBC: CPT

## 2021-08-12 PROCEDURE — 71275 CT ANGIOGRAPHY CHEST: CPT

## 2021-08-12 PROCEDURE — 71045 X-RAY EXAM CHEST 1 VIEW: CPT

## 2021-08-12 PROCEDURE — 74011000250 HC RX REV CODE- 250: Performed by: EMERGENCY MEDICINE

## 2021-08-12 PROCEDURE — 93005 ELECTROCARDIOGRAM TRACING: CPT

## 2021-08-12 PROCEDURE — 99285 EMERGENCY DEPT VISIT HI MDM: CPT

## 2021-08-12 PROCEDURE — 83735 ASSAY OF MAGNESIUM: CPT

## 2021-08-12 PROCEDURE — 74011000636 HC RX REV CODE- 636: Performed by: EMERGENCY MEDICINE

## 2021-08-12 PROCEDURE — 85730 THROMBOPLASTIN TIME PARTIAL: CPT

## 2021-08-12 RX ORDER — LEVETIRACETAM 500 MG/1
500 TABLET ORAL 2 TIMES DAILY
COMMUNITY

## 2021-08-12 RX ORDER — GABAPENTIN 300 MG/1
600 CAPSULE ORAL 3 TIMES DAILY
COMMUNITY
Start: 2020-10-02

## 2021-08-12 RX ORDER — IPRATROPIUM BROMIDE AND ALBUTEROL SULFATE 2.5; .5 MG/3ML; MG/3ML
3 SOLUTION RESPIRATORY (INHALATION)
Status: COMPLETED | OUTPATIENT
Start: 2021-08-12 | End: 2021-08-12

## 2021-08-12 RX ORDER — HYDROCODONE BITARTRATE AND ACETAMINOPHEN 5; 325 MG/1; MG/1
1 TABLET ORAL
Qty: 20 TABLET | Refills: 0 | Status: SHIPPED | OUTPATIENT
Start: 2021-08-12 | End: 2021-08-19

## 2021-08-12 RX ORDER — MORPHINE SULFATE 2 MG/ML
2 INJECTION, SOLUTION INTRAMUSCULAR; INTRAVENOUS ONCE
Status: COMPLETED | OUTPATIENT
Start: 2021-08-12 | End: 2021-08-12

## 2021-08-12 RX ORDER — RIVAROXABAN 15 MG-20MG
KIT ORAL
Qty: 1 DOSE PACK | Refills: 0 | Status: ON HOLD | OUTPATIENT
Start: 2021-08-12 | End: 2022-08-08

## 2021-08-12 RX ORDER — AMLODIPINE BESYLATE 5 MG/1
5 TABLET ORAL DAILY
COMMUNITY
Start: 2020-12-25

## 2021-08-12 RX ORDER — ALBUTEROL SULFATE 90 UG/1
1 AEROSOL, METERED RESPIRATORY (INHALATION)
Qty: 1 INHALER | Refills: 0 | Status: SHIPPED | OUTPATIENT
Start: 2021-08-12

## 2021-08-12 RX ORDER — FLUTICASONE FUROATE, UMECLIDINIUM BROMIDE AND VILANTEROL TRIFENATATE 200; 62.5; 25 UG/1; UG/1; UG/1
1 POWDER RESPIRATORY (INHALATION) DAILY
COMMUNITY

## 2021-08-12 RX ORDER — PREDNISONE 20 MG/1
20 TABLET ORAL 2 TIMES DAILY
Qty: 10 TABLET | Refills: 0 | Status: SHIPPED | OUTPATIENT
Start: 2021-08-12 | End: 2021-08-17

## 2021-08-12 RX ORDER — FAMOTIDINE 20 MG/1
20 TABLET, FILM COATED ORAL 2 TIMES DAILY
COMMUNITY

## 2021-08-12 RX ADMIN — IOPAMIDOL 100 ML: 755 INJECTION, SOLUTION INTRAVENOUS at 11:53

## 2021-08-12 RX ADMIN — NITROGLYCERIN 1 INCH: 20 OINTMENT TOPICAL at 11:18

## 2021-08-12 RX ADMIN — IPRATROPIUM BROMIDE AND ALBUTEROL SULFATE 3 ML: .5; 3 SOLUTION RESPIRATORY (INHALATION) at 13:41

## 2021-08-12 RX ADMIN — MORPHINE SULFATE 2 MG: 2 INJECTION, SOLUTION INTRAMUSCULAR; INTRAVENOUS at 09:53

## 2021-08-12 RX ADMIN — RIVAROXABAN 15 MG: 15 TABLET, FILM COATED ORAL at 13:23

## 2021-08-12 NOTE — ED TRIAGE NOTES
Patient ambulatory into ER c/o SOB, CP, and leg cramping. Patient states the leg cramping has been going on for a week but states the SOB/CP started the other day.

## 2021-08-12 NOTE — DISCHARGE INSTRUCTIONS
Continue taking your Xarelto, blood thinner, as directed. The prescription we give is a starter pack and you will need to get refills of it through your primary care doctor you will likely need to be on this medication for at least 6 months potentially for the remainder of your life. You can continue taking your other medications including aspirin. Come back to the emergency department if your symptoms worsen including but not limited to your oxygen levels going below 90% or worsening chest pain or shortness of breath.
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
## 2021-12-08 NOTE — ROUTINE PROCESS
Bedside and Verbal shift change report given to A Palmer RN (oncoming nurse) by Sabra Strange RN (offgoing nurse). Report included the following information SBAR, Kardex, ED Summary, Procedure Summary, Intake/Output, MAR, Accordion, Recent Results and Med Rec Status. Subsequent Medicare Wellness Visit  The ABC's of Medicare Wellness Visit  Chief Complaint   Patient presents with   • Medicare Wellness-subsequent       Subjective   History of Present Illness      Zachariah Cruz is a 75 y.o. male who presents   for a Subsequent Medicare Wellness Visit.    Does the patient have evidence of cognitive impairment?   No    Asprin use counseling:Taking ASA appropriately as indicated    Recent Hospitalizations:  No hospitalization(s) within the last year.    Advanced Care Planning:  ACP discussion was declined by the patient. Patient has an advance directive in EMR which is still valid.     The following portions of the patient's history were reviewed   and updated as appropriate: allergies, current medications, past family history, past medical history, past social history, past surgical history and problem list.    Compared to one year ago, the patient feels his   physical health is the same.  Compared to one year ago, the patient feels his   mental health is the same.    Reviewed chart for potential of high risk medication in the elderly:  no  Reviewed chart for potential of harmful drug interactions in the elderly:  no    Patient's Body mass index is 33.89 kg/m². indicating that he is obese (BMI >30). Obesity-related health conditions include the following: obstructive sleep apnea, hypertension, coronary heart disease, diabetes mellitus, dyslipidemias, osteoarthritis and lower extremity venous stasis disease. Obesity is unchanged. BMI is is above average; BMI management plan is completed. We discussed portion control and increasing exercise..       HEALTH RISK ASSESSMENT BEGIN  Fall Risk Screen:  LIONEL Fall Risk Assessment was completed, and patient is at HIGH risk for falls. Assessment completed on:12/8/2021    Depression Screen:   PHQ-2/PHQ-9 Depression Screening 12/8/2021   Little interest or pleasure in doing things 0   Feeling down, depressed, or hopeless 0   Total Score 0        Current Medical Providers:  Patient Care Team:  Nelia Frost APRN as PCP - General (Family Medicine)    Smoking Status:  Social History     Tobacco Use   Smoking Status Former Smoker   • Types: Cigarettes   Smokeless Tobacco Never Used       Alcohol Consumption:  Social History     Substance and Sexual Activity   Alcohol Use Not Currently       Health Habits and Functional and Cognitive Screening:  Functional & Cognitive Status 12/8/2021   Do you have difficulty preparing food and eating? No   Do you have difficulty bathing yourself, getting dressed or grooming yourself? No   Do you have difficulty using the toilet? No   Do you have difficulty moving around from place to place? No   Do you have trouble with steps or getting out of a bed or a chair? No   Current Diet Well Balanced Diet   Dental Exam Not up to date   Eye Exam Not up to date   Exercise (times per week) 0 times per week   Current Exercises Include No Regular Exercise   Do you need help using the phone?  No   Are you deaf or do you have serious difficulty hearing?  No   Do you need help with transportation? No   Do you need help shopping? No   Do you need help preparing meals?  No   Do you need help with housework?  No   Do you need help with laundry? No   Do you need help taking your medications? No   Do you need help managing money? No   Do you ever drive or ride in a car without wearing a seat belt? Yes   Have you felt unusual stress, anger or loneliness in the last month? No   Who do you live with? Spouse   If you need help, do you have trouble finding someone available to you? No   Do you have difficulty concentrating, remembering or making decisions? No       Age-appropriate Screening Schedule:  Refer to the list below for future screening recommendations based on patient's age, sex and/or medical conditions. Orders for these recommended tests are listed in the plan section. The patient has been provided with a written  plan.    Health Maintenance   Topic Date Due   • TDAP/TD VACCINES (1 - Tdap) Never done   • ZOSTER VACCINE (1 of 2) Never done   • INFLUENZA VACCINE  12/08/2022 (Originally 8/1/2021)   • DIABETIC EYE EXAM  12/14/2021   • URINE MICROALBUMIN  12/28/2021   • HEMOGLOBIN A1C  04/11/2022   • LIPID PANEL  10/11/2022   • DIABETIC FOOT EXAM  12/08/2022     HEALTH RISK ASSESSMENT END    Outpatient Medications Prior to Visit   Medication Sig Dispense Refill   • allopurinol (ZYLOPRIM) 100 MG tablet Take 1 tablet by mouth Daily.     • amLODIPine (NORVASC) 10 MG tablet Take 1 tablet by mouth Daily. 90 tablet 0   • aspirin (aspirin) 81 MG EC tablet Take 81 mg by mouth Every Other Day.     • glimepiride (AMARYL) 4 MG tablet TAKE ONE TABLET BY MOUTH DAILY 90 tablet 0   • lisinopril (PRINIVIL,ZESTRIL) 20 MG tablet TAKE ONE TABLET BY MOUTH TWICE A  tablet 0   • metFORMIN ER (GLUCOPHAGE-XR) 500 MG 24 hr tablet TAKE FOUR TABLETS BY MOUTH DAILY 360 tablet 0   • metoprolol succinate XL (TOPROL-XL) 25 MG 24 hr tablet TAKE ONE TABLET BY MOUTH TWICE A  tablet 0   • OneTouch Ultra test strip 1 each by Other route 2 (two) times a day.     • simvastatin (ZOCOR) 40 MG tablet TAKE ONE TABLET BY MOUTH DAILY 90 tablet 0   • tiZANidine (ZANAFLEX) 4 MG tablet Take 1 tablet by mouth 2 (Two) Times a Day As Needed for Muscle Spasms. 60 tablet 0   • warfarin (Coumadin) 1 MG tablet Take as directed 90 tablet 1   • warfarin (COUMADIN) 5 MG tablet Take 1 tablet by mouth Daily. 90 tablet 2     No facility-administered medications prior to visit.       Patient Active Problem List   Diagnosis   • Atrial fibrillation (HCC)   • Diabetes mellitus (HCC)   • HTN (hypertension)   • Hyperlipemia   • Left leg swelling   • Left leg pain   • Acute bilateral low back pain with left-sided sciatica   • Uncontrolled diabetes mellitus with complications (HCC)   • Screening for prostate cancer   • Chronic right-sided low back pain without sciatica   • Personal  "history of COVID-19   • Chronic gout involving toe without tophus   • Other specific arthropathies, not elsewhere classified, multiple sites   • Gross hematuria       Review of Systems   Constitutional: Negative.  Negative for fatigue.   HENT: Positive for sinus pressure.    Eyes: Negative.    Respiratory: Negative.  Negative for cough and shortness of breath.    Cardiovascular: Positive for leg swelling. Negative for chest pain.   Gastrointestinal: Negative.  Negative for abdominal pain, constipation and diarrhea.   Endocrine: Positive for cold intolerance.   Genitourinary: Negative.  Negative for difficulty urinating.   Musculoskeletal: Positive for back pain and joint swelling.   Skin: Negative.    Allergic/Immunologic: Positive for environmental allergies.   Neurological: Negative.  Negative for dizziness and numbness.   Hematological: Bruises/bleeds easily.   Psychiatric/Behavioral: Positive for decreased concentration and sleep disturbance.       Objective      Vitals:    12/08/21 0856   BP Location: Left arm   Patient Position: Sitting   Cuff Size: Large Adult   Temp: 98.1 °F (36.7 °C)   TempSrc: Oral   Weight: 107 kg (236 lb 3.2 oz)   Height: 177.8 cm (70\")       Physical Exam  HENT:      Head: Normocephalic.      Right Ear: Ear canal and external ear normal. A middle ear effusion is present.      Left Ear: Ear canal and external ear normal. A middle ear effusion is present.      Nose: Nose normal.      Mouth/Throat:      Mouth: Mucous membranes are moist.      Pharynx: Oropharynx is clear.   Eyes:      Conjunctiva/sclera: Conjunctivae normal.      Pupils: Pupils are equal, round, and reactive to light.   Cardiovascular:      Rate and Rhythm: Normal rate and regular rhythm.      Pulses: Normal pulses.           Dorsalis pedis pulses are 2+ on the right side and 2+ on the left side.      Heart sounds: Normal heart sounds.   Pulmonary:      Effort: Pulmonary effort is normal. No respiratory distress.      " Breath sounds: Normal breath sounds. No stridor. No wheezing, rhonchi or rales.   Abdominal:      General: Abdomen is flat. Bowel sounds are normal.      Palpations: Abdomen is soft.   Musculoskeletal:      Cervical back: Normal and normal range of motion.      Thoracic back: Normal.      Lumbar back: Tenderness present. Positive right straight leg raise test. Negative left straight leg raise test.        Back:       Right lower leg: No edema.      Left lower leg: Edema present.      Right foot: Bunion present.      Left foot: Bunion present.   Feet:      Right foot:      Protective Sensation: 3 sites tested. 3 sites sensed.      Skin integrity: Skin integrity normal. No ulcer, blister or erythema.      Toenail Condition: Right toenails are normal.      Left foot:      Protective Sensation: 3 sites tested. 3 sites sensed.      Skin integrity: Skin integrity normal. No ulcer, blister or erythema.      Toenail Condition: Left toenails are normal.      Comments:      Lymphadenopathy:      Cervical: No cervical adenopathy.   Skin:     General: Skin is warm and dry.      Capillary Refill: Capillary refill takes less than 2 seconds.      Findings: Erythema present.          Neurological:      Mental Status: He is alert and oriented to person, place, and time.      Cranial Nerves: Cranial nerves are intact.      Sensory: Sensation is intact.      Motor: Motor function is intact.      Coordination: Coordination is intact.      Gait: Gait is intact.   Psychiatric:         Attention and Perception: Attention normal.         Mood and Affect: Mood normal.         Speech: Speech normal.         Behavior: Behavior normal. Behavior is cooperative.         Thought Content: Thought content normal.         Cognition and Memory: Cognition and memory normal.         Judgment: Judgment normal.         Diabetic Foot Exam Performed and Monofilament Test Performed    Result Review :     Lab Results   Component Value Date    TRIG 161 (H)  10/11/2021    HDL 38 (L) 10/11/2021    LDL 73 10/11/2021    VLDL 28 10/11/2021    HGBA1C 7.05 (H) 10/11/2021                Assessment/Plan   Assessment and Plan      Diagnoses and all orders for this visit:    1. Wellness examination (Primary)  Comments:  Patient to discuss immunizations with wife and update our records    2. Primary hypertension  Comments:  Follow up with PCP to discuss BP medications  Assessment & Plan:  Hypertension is worsening.  Continue current treatment regimen.  Dietary sodium restriction.  Weight loss.  Regular aerobic exercise.  Continue current medications.  Ambulatory blood pressure monitoring.  Blood pressure will be reassessed in 2 weeks.      3. Left leg swelling  Comments:  Follow up appointment with PCP to discuss BP medication      Medicare Risks and Personalized Health Plan  CMS Preventative Services Quick Reference  Cardiovascular risk  Fall Risk  Immunizations Discussed/Encouraged (specific immunizations; Tdap, Pneumococcal 23 and COVID19 )  Osteoporosis Risk    The above risks/problems have been discussed with the patient.  Pertinent information has been shared with the patient in the   After Visit Summary. Follow up plans and orders are seen below   in the Assessment/Plan Section.        Follow Up    Return in about 1 week (around 12/15/2021), or if symptoms worsen or fail to improve.     An After Visit Summary and PPPS were given to the patient.

## 2022-04-07 ENCOUNTER — APPOINTMENT (OUTPATIENT)
Dept: CT IMAGING | Age: 77
End: 2022-04-07
Attending: EMERGENCY MEDICINE
Payer: MEDICARE

## 2022-04-07 ENCOUNTER — APPOINTMENT (OUTPATIENT)
Dept: GENERAL RADIOLOGY | Age: 77
End: 2022-04-07
Attending: EMERGENCY MEDICINE
Payer: MEDICARE

## 2022-04-07 ENCOUNTER — HOSPITAL ENCOUNTER (EMERGENCY)
Age: 77
Discharge: HOME | End: 2022-04-07
Attending: EMERGENCY MEDICINE
Payer: MEDICARE

## 2022-04-07 VITALS
HEART RATE: 56 BPM | WEIGHT: 190 LBS | BODY MASS INDEX: 32.44 KG/M2 | HEIGHT: 64 IN | TEMPERATURE: 97.7 F | SYSTOLIC BLOOD PRESSURE: 158 MMHG | DIASTOLIC BLOOD PRESSURE: 63 MMHG | RESPIRATION RATE: 13 BRPM | OXYGEN SATURATION: 95 %

## 2022-04-07 DIAGNOSIS — R07.89 CHEST PRESSURE: Primary | ICD-10-CM

## 2022-04-07 LAB
ALBUMIN SERPL-MCNC: 3.6 G/DL (ref 3.4–5)
ALBUMIN/GLOB SERPL: 0.8 {RATIO} (ref 0.8–1.7)
ALP SERPL-CCNC: 101 U/L (ref 45–117)
ALT SERPL-CCNC: 38 U/L (ref 13–56)
ANION GAP SERPL CALC-SCNC: 6 MMOL/L (ref 3–18)
APPEARANCE UR: CLEAR
AST SERPL-CCNC: 33 U/L (ref 10–38)
BACTERIA URNS QL MICRO: ABNORMAL /HPF
BASOPHILS # BLD: 0.1 K/UL (ref 0–0.1)
BASOPHILS NFR BLD: 1 % (ref 0–2)
BILIRUB SERPL-MCNC: 0.7 MG/DL (ref 0.2–1)
BILIRUB UR QL: NEGATIVE
BNP SERPL-MCNC: 606 PG/ML (ref 0–1800)
BUN SERPL-MCNC: 10 MG/DL (ref 7–18)
BUN/CREAT SERPL: 11 (ref 12–20)
CALCIUM SERPL-MCNC: 9.4 MG/DL (ref 8.5–10.1)
CHLORIDE SERPL-SCNC: 104 MMOL/L (ref 100–111)
CO2 SERPL-SCNC: 28 MMOL/L (ref 21–32)
COLOR UR: YELLOW
COVID-19 RAPID TEST, COVR: NOT DETECTED
CREAT SERPL-MCNC: 0.89 MG/DL (ref 0.6–1.3)
D DIMER PPP FEU-MCNC: 0.39 UG/ML(FEU)
DIFFERENTIAL METHOD BLD: ABNORMAL
EOSINOPHIL # BLD: 0.5 K/UL (ref 0–0.4)
EOSINOPHIL NFR BLD: 8 % (ref 0–5)
EPITH CASTS URNS QL MICRO: ABNORMAL /LPF (ref 0–5)
ERYTHROCYTE [DISTWIDTH] IN BLOOD BY AUTOMATED COUNT: 16.9 % (ref 11.6–14.5)
FLUAV AG NPH QL IA: NEGATIVE
FLUBV AG NOSE QL IA: NEGATIVE
GLOBULIN SER CALC-MCNC: 4.5 G/DL (ref 2–4)
GLUCOSE SERPL-MCNC: 228 MG/DL (ref 74–99)
GLUCOSE UR STRIP.AUTO-MCNC: 250 MG/DL
HCT VFR BLD AUTO: 37 % (ref 35–45)
HGB BLD-MCNC: 11.5 G/DL (ref 12–16)
HGB UR QL STRIP: NEGATIVE
IMM GRANULOCYTES # BLD AUTO: 0 K/UL (ref 0–0.04)
IMM GRANULOCYTES NFR BLD AUTO: 0 % (ref 0–0.5)
KETONES UR QL STRIP.AUTO: ABNORMAL MG/DL
LEUKOCYTE ESTERASE UR QL STRIP.AUTO: ABNORMAL
LYMPHOCYTES # BLD: 1.6 K/UL (ref 0.9–3.6)
LYMPHOCYTES NFR BLD: 29 % (ref 21–52)
MCH RBC QN AUTO: 24.7 PG (ref 24–34)
MCHC RBC AUTO-ENTMCNC: 31.1 G/DL (ref 31–37)
MCV RBC AUTO: 79.4 FL (ref 78–100)
MONOCYTES # BLD: 0.4 K/UL (ref 0.05–1.2)
MONOCYTES NFR BLD: 8 % (ref 3–10)
NEUTS SEG # BLD: 2.9 K/UL (ref 1.8–8)
NEUTS SEG NFR BLD: 54 % (ref 40–73)
NITRITE UR QL STRIP.AUTO: NEGATIVE
NRBC # BLD: 0 K/UL (ref 0–0.01)
NRBC BLD-RTO: 0 PER 100 WBC
PH UR STRIP: 6.5 [PH] (ref 5–8)
PLATELET # BLD AUTO: 322 K/UL (ref 135–420)
PMV BLD AUTO: 11.1 FL (ref 9.2–11.8)
POTASSIUM SERPL-SCNC: 4.2 MMOL/L (ref 3.5–5.5)
PROT SERPL-MCNC: 8.1 G/DL (ref 6.4–8.2)
PROT UR STRIP-MCNC: NEGATIVE MG/DL
RBC # BLD AUTO: 4.66 M/UL (ref 4.2–5.3)
RBC #/AREA URNS HPF: ABNORMAL /HPF (ref 0–5)
S PYO AG THROAT QL: NEGATIVE
SODIUM SERPL-SCNC: 138 MMOL/L (ref 136–145)
SOURCE, COVRS: NORMAL
SP GR UR REFRACTOMETRY: 1.02 (ref 1–1.03)
TROPONIN-HIGH SENSITIVITY: 5 NG/L (ref 0–54)
TROPONIN-HIGH SENSITIVITY: 5 NG/L (ref 0–54)
UROBILINOGEN UR QL STRIP.AUTO: 1 EU/DL (ref 0.2–1)
WBC # BLD AUTO: 5.4 K/UL (ref 4.6–13.2)
WBC URNS QL MICRO: ABNORMAL /HPF (ref 0–5)

## 2022-04-07 PROCEDURE — 87070 CULTURE OTHR SPECIMN AEROBIC: CPT

## 2022-04-07 PROCEDURE — 85025 COMPLETE CBC W/AUTO DIFF WBC: CPT

## 2022-04-07 PROCEDURE — 85379 FIBRIN DEGRADATION QUANT: CPT

## 2022-04-07 PROCEDURE — 80053 COMPREHEN METABOLIC PANEL: CPT

## 2022-04-07 PROCEDURE — 84484 ASSAY OF TROPONIN QUANT: CPT

## 2022-04-07 PROCEDURE — 96374 THER/PROPH/DIAG INJ IV PUSH: CPT

## 2022-04-07 PROCEDURE — 99285 EMERGENCY DEPT VISIT HI MDM: CPT

## 2022-04-07 PROCEDURE — 74011000636 HC RX REV CODE- 636: Performed by: EMERGENCY MEDICINE

## 2022-04-07 PROCEDURE — 71045 X-RAY EXAM CHEST 1 VIEW: CPT

## 2022-04-07 PROCEDURE — 74011250637 HC RX REV CODE- 250/637: Performed by: EMERGENCY MEDICINE

## 2022-04-07 PROCEDURE — 74011250636 HC RX REV CODE- 250/636: Performed by: EMERGENCY MEDICINE

## 2022-04-07 PROCEDURE — 83880 ASSAY OF NATRIURETIC PEPTIDE: CPT

## 2022-04-07 PROCEDURE — 87804 INFLUENZA ASSAY W/OPTIC: CPT

## 2022-04-07 PROCEDURE — 93005 ELECTROCARDIOGRAM TRACING: CPT

## 2022-04-07 PROCEDURE — 87880 STREP A ASSAY W/OPTIC: CPT

## 2022-04-07 PROCEDURE — 81001 URINALYSIS AUTO W/SCOPE: CPT

## 2022-04-07 PROCEDURE — 74011000250 HC RX REV CODE- 250: Performed by: EMERGENCY MEDICINE

## 2022-04-07 PROCEDURE — 87635 SARS-COV-2 COVID-19 AMP PRB: CPT

## 2022-04-07 PROCEDURE — 71275 CT ANGIOGRAPHY CHEST: CPT

## 2022-04-07 RX ORDER — FAMOTIDINE 10 MG/ML
20 INJECTION INTRAVENOUS
Status: COMPLETED | OUTPATIENT
Start: 2022-04-07 | End: 2022-04-07

## 2022-04-07 RX ORDER — ACETAMINOPHEN 500 MG
1000 TABLET ORAL ONCE
Status: COMPLETED | OUTPATIENT
Start: 2022-04-07 | End: 2022-04-07

## 2022-04-07 RX ORDER — GABAPENTIN 300 MG/1
300 CAPSULE ORAL
Status: COMPLETED | OUTPATIENT
Start: 2022-04-07 | End: 2022-04-07

## 2022-04-07 RX ADMIN — ALUMINUM HYDROXIDE, MAGNESIUM HYDROXIDE, AND SIMETHICONE 40 ML: 200; 200; 20 SUSPENSION ORAL at 16:23

## 2022-04-07 RX ADMIN — GABAPENTIN 300 MG: 300 CAPSULE ORAL at 17:36

## 2022-04-07 RX ADMIN — ACETAMINOPHEN 1000 MG: 500 TABLET ORAL at 15:16

## 2022-04-07 RX ADMIN — FAMOTIDINE 20 MG: 10 INJECTION, SOLUTION INTRAVENOUS at 16:23

## 2022-04-07 RX ADMIN — IOPAMIDOL 80 ML: 755 INJECTION, SOLUTION INTRAVENOUS at 17:25
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
## 2022-04-07 NOTE — ED NOTES
I have reviewed discharge instructions with the patient. The patient verbalized understanding.
verbal cues/nonverbal cues (demo/gestures)/1 person assist

## 2022-04-07 NOTE — ED TRIAGE NOTES
Pt states \" I have been having chest pressure x 3 days and my throat feels hard to swallow and I am having pain all down the right side. \" Pt was able to ambulate to the stretcher. \"

## 2022-04-09 LAB
ATRIAL RATE: 59 BPM
ATRIAL RATE: 69 BPM
BACTERIA SPEC CULT: NORMAL
CALCULATED P AXIS, ECG09: 58 DEGREES
CALCULATED P AXIS, ECG09: 70 DEGREES
CALCULATED R AXIS, ECG10: 69 DEGREES
CALCULATED R AXIS, ECG10: 9 DEGREES
CALCULATED T AXIS, ECG11: 69 DEGREES
CALCULATED T AXIS, ECG11: 70 DEGREES
DIAGNOSIS, 93000: NORMAL
DIAGNOSIS, 93000: NORMAL
P-R INTERVAL, ECG05: 172 MS
P-R INTERVAL, ECG05: 176 MS
Q-T INTERVAL, ECG07: 428 MS
Q-T INTERVAL, ECG07: 450 MS
QRS DURATION, ECG06: 78 MS
QRS DURATION, ECG06: 80 MS
QTC CALCULATION (BEZET), ECG08: 445 MS
QTC CALCULATION (BEZET), ECG08: 458 MS
SERVICE CMNT-IMP: NORMAL
VENTRICULAR RATE, ECG03: 59 BPM
VENTRICULAR RATE, ECG03: 69 BPM

## 2022-05-11 ENCOUNTER — HOSPITAL ENCOUNTER (OUTPATIENT)
Dept: LAB | Age: 77
Discharge: HOME | End: 2022-05-11

## 2022-05-11 LAB — XX-LABCORP SPECIMEN COL,LCBCF: NORMAL

## 2022-05-11 PROCEDURE — 99001 SPECIMEN HANDLING PT-LAB: CPT

## 2022-07-07 ENCOUNTER — APPOINTMENT (OUTPATIENT)
Dept: CT IMAGING | Age: 77
DRG: 191 | End: 2022-07-07
Attending: PHYSICIAN ASSISTANT
Payer: MEDICARE

## 2022-07-07 ENCOUNTER — APPOINTMENT (OUTPATIENT)
Dept: GENERAL RADIOLOGY | Age: 77
DRG: 191 | End: 2022-07-07
Attending: STUDENT IN AN ORGANIZED HEALTH CARE EDUCATION/TRAINING PROGRAM
Payer: MEDICARE

## 2022-07-07 ENCOUNTER — HOSPITAL ENCOUNTER (INPATIENT)
Age: 77
LOS: 5 days | Discharge: HOME HEALTH | DRG: 191 | End: 2022-07-12
Attending: STUDENT IN AN ORGANIZED HEALTH CARE EDUCATION/TRAINING PROGRAM | Admitting: HOSPITALIST
Payer: MEDICARE

## 2022-07-07 ENCOUNTER — APPOINTMENT (OUTPATIENT)
Dept: VASCULAR SURGERY | Age: 77
DRG: 191 | End: 2022-07-07
Attending: PHYSICIAN ASSISTANT
Payer: MEDICARE

## 2022-07-07 DIAGNOSIS — G57.93 NEUROPATHIC PAIN OF BOTH LEGS: ICD-10-CM

## 2022-07-07 DIAGNOSIS — J44.1 ACUTE EXACERBATION OF CHRONIC OBSTRUCTIVE PULMONARY DISEASE (COPD) (HCC): Primary | ICD-10-CM

## 2022-07-07 DIAGNOSIS — R09.02 HYPOXEMIA: ICD-10-CM

## 2022-07-07 DIAGNOSIS — R07.89 ATYPICAL CHEST PAIN: ICD-10-CM

## 2022-07-07 PROBLEM — M79.2 NEUROPATHIC PAIN, LEG: Status: ACTIVE | Noted: 2022-07-07

## 2022-07-07 LAB
ALBUMIN SERPL-MCNC: 3.4 G/DL (ref 3.4–5)
ALBUMIN/GLOB SERPL: 0.8 (ref 0.8–1.7)
ALP SERPL-CCNC: 85 U/L (ref 45–117)
ALT SERPL-CCNC: 39 U/L (ref 13–56)
ANION GAP SERPL CALC-SCNC: 7 MMOL/L (ref 3–18)
APTT PPP: 28.6 SEC (ref 23–36.4)
ARTERIAL PATENCY WRIST A: POSITIVE
AST SERPL-CCNC: 34 U/L (ref 10–38)
BASE DEFICIT BLD-SCNC: 1.3 MMOL/L
BASOPHILS # BLD: 0.1 K/UL (ref 0–0.1)
BASOPHILS NFR BLD: 1 % (ref 0–2)
BDY SITE: ABNORMAL
BILIRUB SERPL-MCNC: 0.6 MG/DL (ref 0.2–1)
BNP SERPL-MCNC: 333 PG/ML (ref 0–1800)
BUN SERPL-MCNC: 10 MG/DL (ref 7–18)
BUN/CREAT SERPL: 12 (ref 12–20)
CALCIUM SERPL-MCNC: 9.2 MG/DL (ref 8.5–10.1)
CHLORIDE SERPL-SCNC: 106 MMOL/L (ref 100–111)
CO2 SERPL-SCNC: 25 MMOL/L (ref 21–32)
COVID-19 RAPID TEST, COVR: NOT DETECTED
CREAT SERPL-MCNC: 0.82 MG/DL (ref 0.6–1.3)
D DIMER PPP FEU-MCNC: 0.34 UG/ML(FEU)
DIFFERENTIAL METHOD BLD: ABNORMAL
EOSINOPHIL # BLD: 0.7 K/UL (ref 0–0.4)
EOSINOPHIL NFR BLD: 14 % (ref 0–5)
ERYTHROCYTE [DISTWIDTH] IN BLOOD BY AUTOMATED COUNT: 18.2 % (ref 11.6–14.5)
GAS FLOW.O2 O2 DELIVERY SYS: ABNORMAL
GLOBULIN SER CALC-MCNC: 4.5 G/DL (ref 2–4)
GLUCOSE BLD STRIP.AUTO-MCNC: 278 MG/DL (ref 70–110)
GLUCOSE SERPL-MCNC: 191 MG/DL (ref 74–99)
HCO3 BLD-SCNC: 23.8 MMOL/L (ref 22–26)
HCT VFR BLD AUTO: 35.2 % (ref 35–45)
HGB BLD-MCNC: 10.8 G/DL (ref 12–16)
IMM GRANULOCYTES # BLD AUTO: 0 K/UL (ref 0–0.04)
IMM GRANULOCYTES NFR BLD AUTO: 0 % (ref 0–0.5)
INR PPP: 1.1 (ref 0.8–1.2)
LYMPHOCYTES # BLD: 1.6 K/UL (ref 0.9–3.6)
LYMPHOCYTES NFR BLD: 31 % (ref 21–52)
MAGNESIUM SERPL-MCNC: 1.7 MG/DL (ref 1.6–2.6)
MCH RBC QN AUTO: 23.8 PG (ref 24–34)
MCHC RBC AUTO-ENTMCNC: 30.7 G/DL (ref 31–37)
MCV RBC AUTO: 77.5 FL (ref 78–100)
MONOCYTES # BLD: 0.5 K/UL (ref 0.05–1.2)
MONOCYTES NFR BLD: 10 % (ref 3–10)
NEUTS SEG # BLD: 2.2 K/UL (ref 1.8–8)
NEUTS SEG NFR BLD: 44 % (ref 40–73)
NRBC # BLD: 0 K/UL (ref 0–0.01)
NRBC BLD-RTO: 0 PER 100 WBC
O2/TOTAL GAS SETTING VFR VENT: 36 %
PCO2 BLD: 40.2 MMHG (ref 35–45)
PH BLD: 7.38 (ref 7.35–7.45)
PLATELET # BLD AUTO: 296 K/UL (ref 135–420)
PMV BLD AUTO: 11.3 FL (ref 9.2–11.8)
PO2 BLD: 69 MMHG (ref 80–100)
POTASSIUM SERPL-SCNC: 3.7 MMOL/L (ref 3.5–5.5)
PROT SERPL-MCNC: 7.9 G/DL (ref 6.4–8.2)
PROTHROMBIN TIME: 14.3 SEC (ref 11.5–15.2)
RBC # BLD AUTO: 4.54 M/UL (ref 4.2–5.3)
SAO2 % BLD: 93.2 % (ref 92–97)
SERVICE CMNT-IMP: ABNORMAL
SODIUM SERPL-SCNC: 138 MMOL/L (ref 136–145)
SOURCE, COVRS: NORMAL
SPECIMEN TYPE: ABNORMAL
TROPONIN-HIGH SENSITIVITY: 6 NG/L (ref 0–54)
TROPONIN-HIGH SENSITIVITY: 6 NG/L (ref 0–54)
WBC # BLD AUTO: 5.1 K/UL (ref 4.6–13.2)

## 2022-07-07 PROCEDURE — 85610 PROTHROMBIN TIME: CPT

## 2022-07-07 PROCEDURE — 96375 TX/PRO/DX INJ NEW DRUG ADDON: CPT

## 2022-07-07 PROCEDURE — 80053 COMPREHEN METABOLIC PANEL: CPT

## 2022-07-07 PROCEDURE — 74011250637 HC RX REV CODE- 250/637: Performed by: PHYSICIAN ASSISTANT

## 2022-07-07 PROCEDURE — 74011250637 HC RX REV CODE- 250/637: Performed by: HOSPITALIST

## 2022-07-07 PROCEDURE — 85379 FIBRIN DEGRADATION QUANT: CPT

## 2022-07-07 PROCEDURE — 93005 ELECTROCARDIOGRAM TRACING: CPT

## 2022-07-07 PROCEDURE — 93970 EXTREMITY STUDY: CPT

## 2022-07-07 PROCEDURE — 74011000250 HC RX REV CODE- 250: Performed by: HOSPITALIST

## 2022-07-07 PROCEDURE — 74011000636 HC RX REV CODE- 636: Performed by: STUDENT IN AN ORGANIZED HEALTH CARE EDUCATION/TRAINING PROGRAM

## 2022-07-07 PROCEDURE — G0378 HOSPITAL OBSERVATION PER HR: HCPCS

## 2022-07-07 PROCEDURE — 74011250636 HC RX REV CODE- 250/636: Performed by: PHYSICIAN ASSISTANT

## 2022-07-07 PROCEDURE — 36415 COLL VENOUS BLD VENIPUNCTURE: CPT

## 2022-07-07 PROCEDURE — 74011636637 HC RX REV CODE- 636/637: Performed by: HOSPITALIST

## 2022-07-07 PROCEDURE — 94762 N-INVAS EAR/PLS OXIMTRY CONT: CPT

## 2022-07-07 PROCEDURE — 74011000258 HC RX REV CODE- 258: Performed by: HOSPITALIST

## 2022-07-07 PROCEDURE — 65270000029 HC RM PRIVATE

## 2022-07-07 PROCEDURE — 94640 AIRWAY INHALATION TREATMENT: CPT

## 2022-07-07 PROCEDURE — 71275 CT ANGIOGRAPHY CHEST: CPT

## 2022-07-07 PROCEDURE — 74011250636 HC RX REV CODE- 250/636: Performed by: HOSPITALIST

## 2022-07-07 PROCEDURE — 71045 X-RAY EXAM CHEST 1 VIEW: CPT

## 2022-07-07 PROCEDURE — 83735 ASSAY OF MAGNESIUM: CPT

## 2022-07-07 PROCEDURE — 36600 WITHDRAWAL OF ARTERIAL BLOOD: CPT

## 2022-07-07 PROCEDURE — 83880 ASSAY OF NATRIURETIC PEPTIDE: CPT

## 2022-07-07 PROCEDURE — 85025 COMPLETE CBC W/AUTO DIFF WBC: CPT

## 2022-07-07 PROCEDURE — 85730 THROMBOPLASTIN TIME PARTIAL: CPT

## 2022-07-07 PROCEDURE — 82803 BLOOD GASES ANY COMBINATION: CPT

## 2022-07-07 PROCEDURE — 99285 EMERGENCY DEPT VISIT HI MDM: CPT

## 2022-07-07 PROCEDURE — 96365 THER/PROPH/DIAG IV INF INIT: CPT

## 2022-07-07 PROCEDURE — 87040 BLOOD CULTURE FOR BACTERIA: CPT

## 2022-07-07 PROCEDURE — 74011000250 HC RX REV CODE- 250: Performed by: PHYSICIAN ASSISTANT

## 2022-07-07 PROCEDURE — 82962 GLUCOSE BLOOD TEST: CPT

## 2022-07-07 PROCEDURE — 87635 SARS-COV-2 COVID-19 AMP PRB: CPT

## 2022-07-07 PROCEDURE — 84484 ASSAY OF TROPONIN QUANT: CPT

## 2022-07-07 RX ORDER — INSULIN LISPRO 100 [IU]/ML
INJECTION, SOLUTION INTRAVENOUS; SUBCUTANEOUS
Status: DISCONTINUED | OUTPATIENT
Start: 2022-07-07 | End: 2022-07-09

## 2022-07-07 RX ORDER — OXYCODONE AND ACETAMINOPHEN 5; 325 MG/1; MG/1
1 TABLET ORAL
Status: DISCONTINUED | OUTPATIENT
Start: 2022-07-07 | End: 2022-07-12 | Stop reason: HOSPADM

## 2022-07-07 RX ORDER — IBUPROFEN 200 MG
16 TABLET ORAL AS NEEDED
Status: DISCONTINUED | OUTPATIENT
Start: 2022-07-07 | End: 2022-07-10 | Stop reason: SDUPTHER

## 2022-07-07 RX ORDER — MAGNESIUM SULFATE HEPTAHYDRATE 40 MG/ML
2 INJECTION, SOLUTION INTRAVENOUS ONCE
Status: COMPLETED | OUTPATIENT
Start: 2022-07-07 | End: 2022-07-07

## 2022-07-07 RX ORDER — IPRATROPIUM BROMIDE AND ALBUTEROL SULFATE 2.5; .5 MG/3ML; MG/3ML
3 SOLUTION RESPIRATORY (INHALATION)
Status: COMPLETED | OUTPATIENT
Start: 2022-07-07 | End: 2022-07-07

## 2022-07-07 RX ORDER — PREDNISONE 20 MG/1
40 TABLET ORAL DAILY
Qty: 8 TABLET | Refills: 0 | Status: SHIPPED | OUTPATIENT
Start: 2022-07-07 | End: 2022-07-07 | Stop reason: SDUPTHER

## 2022-07-07 RX ORDER — FAMOTIDINE 20 MG/1
20 TABLET, FILM COATED ORAL 2 TIMES DAILY
Status: DISCONTINUED | OUTPATIENT
Start: 2022-07-07 | End: 2022-07-12 | Stop reason: HOSPADM

## 2022-07-07 RX ORDER — ONDANSETRON 2 MG/ML
4 INJECTION INTRAMUSCULAR; INTRAVENOUS
Status: COMPLETED | OUTPATIENT
Start: 2022-07-07 | End: 2022-07-07

## 2022-07-07 RX ORDER — AMLODIPINE BESYLATE 5 MG/1
5 TABLET ORAL DAILY
Status: DISCONTINUED | OUTPATIENT
Start: 2022-07-08 | End: 2022-07-12 | Stop reason: HOSPADM

## 2022-07-07 RX ORDER — INSULIN GLARGINE 100 [IU]/ML
30 INJECTION, SOLUTION SUBCUTANEOUS DAILY
Status: DISCONTINUED | OUTPATIENT
Start: 2022-07-08 | End: 2022-07-09

## 2022-07-07 RX ORDER — DEXAMETHASONE SODIUM PHOSPHATE 10 MG/ML
6 INJECTION INTRAMUSCULAR; INTRAVENOUS
Status: COMPLETED | OUTPATIENT
Start: 2022-07-07 | End: 2022-07-07

## 2022-07-07 RX ORDER — BUDESONIDE 0.5 MG/2ML
500 INHALANT ORAL
Status: DISCONTINUED | OUTPATIENT
Start: 2022-07-07 | End: 2022-07-12 | Stop reason: HOSPADM

## 2022-07-07 RX ORDER — GABAPENTIN 300 MG/1
300 CAPSULE ORAL 3 TIMES DAILY
Status: DISCONTINUED | OUTPATIENT
Start: 2022-07-07 | End: 2022-07-12 | Stop reason: HOSPADM

## 2022-07-07 RX ORDER — GABAPENTIN 300 MG/1
600 CAPSULE ORAL 3 TIMES DAILY
Status: DISCONTINUED | OUTPATIENT
Start: 2022-07-07 | End: 2022-07-07

## 2022-07-07 RX ORDER — LEVETIRACETAM 500 MG/1
500 TABLET ORAL 2 TIMES DAILY
Status: DISCONTINUED | OUTPATIENT
Start: 2022-07-07 | End: 2022-07-12 | Stop reason: HOSPADM

## 2022-07-07 RX ORDER — ENOXAPARIN SODIUM 100 MG/ML
40 INJECTION SUBCUTANEOUS EVERY 24 HOURS
Status: DISCONTINUED | OUTPATIENT
Start: 2022-07-07 | End: 2022-07-12 | Stop reason: HOSPADM

## 2022-07-07 RX ORDER — GUAIFENESIN 100 MG/5ML
81 LIQUID (ML) ORAL DAILY
Status: DISCONTINUED | OUTPATIENT
Start: 2022-07-08 | End: 2022-07-12 | Stop reason: HOSPADM

## 2022-07-07 RX ORDER — LANOLIN ALCOHOL/MO/W.PET/CERES
400 CREAM (GRAM) TOPICAL DAILY
Status: DISCONTINUED | OUTPATIENT
Start: 2022-07-08 | End: 2022-07-12 | Stop reason: HOSPADM

## 2022-07-07 RX ORDER — ATORVASTATIN CALCIUM 20 MG/1
40 TABLET, FILM COATED ORAL
Status: DISCONTINUED | OUTPATIENT
Start: 2022-07-07 | End: 2022-07-12 | Stop reason: HOSPADM

## 2022-07-07 RX ORDER — OXYCODONE AND ACETAMINOPHEN 5; 325 MG/1; MG/1
1 TABLET ORAL
Status: COMPLETED | OUTPATIENT
Start: 2022-07-07 | End: 2022-07-07

## 2022-07-07 RX ORDER — ATENOLOL 50 MG/1
100 TABLET ORAL DAILY
Status: DISCONTINUED | OUTPATIENT
Start: 2022-07-08 | End: 2022-07-12 | Stop reason: HOSPADM

## 2022-07-07 RX ORDER — ARFORMOTEROL TARTRATE 15 UG/2ML
15 SOLUTION RESPIRATORY (INHALATION)
Status: DISCONTINUED | OUTPATIENT
Start: 2022-07-07 | End: 2022-07-12 | Stop reason: HOSPADM

## 2022-07-07 RX ORDER — DEXTROSE MONOHYDRATE 100 MG/ML
0-250 INJECTION, SOLUTION INTRAVENOUS AS NEEDED
Status: DISCONTINUED | OUTPATIENT
Start: 2022-07-07 | End: 2022-07-10 | Stop reason: SDUPTHER

## 2022-07-07 RX ORDER — GABAPENTIN 300 MG/1
300 CAPSULE ORAL 3 TIMES DAILY
Status: DISCONTINUED | OUTPATIENT
Start: 2022-07-08 | End: 2022-07-07

## 2022-07-07 RX ADMIN — IOPAMIDOL 100 ML: 755 INJECTION, SOLUTION INTRAVENOUS at 14:56

## 2022-07-07 RX ADMIN — CEFTRIAXONE 1 G: 1 INJECTION, POWDER, FOR SOLUTION INTRAMUSCULAR; INTRAVENOUS at 21:31

## 2022-07-07 RX ADMIN — MAGNESIUM SULFATE HEPTAHYDRATE 2 G: 40 INJECTION, SOLUTION INTRAVENOUS at 16:44

## 2022-07-07 RX ADMIN — IPRATROPIUM BROMIDE AND ALBUTEROL SULFATE 3 ML: .5; 3 SOLUTION RESPIRATORY (INHALATION) at 16:13

## 2022-07-07 RX ADMIN — DEXAMETHASONE SODIUM PHOSPHATE 6 MG: 10 INJECTION, SOLUTION INTRAMUSCULAR; INTRAVENOUS at 15:48

## 2022-07-07 RX ADMIN — INSULIN LISPRO 6 UNITS: 100 INJECTION, SOLUTION INTRAVENOUS; SUBCUTANEOUS at 23:00

## 2022-07-07 RX ADMIN — ATORVASTATIN CALCIUM 40 MG: 20 TABLET, FILM COATED ORAL at 21:32

## 2022-07-07 RX ADMIN — OXYCODONE HYDROCHLORIDE AND ACETAMINOPHEN 1 TABLET: 5; 325 TABLET ORAL at 12:02

## 2022-07-07 RX ADMIN — FAMOTIDINE 20 MG: 20 TABLET, FILM COATED ORAL at 21:32

## 2022-07-07 RX ADMIN — OXYCODONE HYDROCHLORIDE AND ACETAMINOPHEN 1 TABLET: 5; 325 TABLET ORAL at 19:54

## 2022-07-07 RX ADMIN — GABAPENTIN 300 MG: 300 CAPSULE ORAL at 23:44

## 2022-07-07 RX ADMIN — ARFORMOTEROL TARTRATE 15 MCG: 15 SOLUTION RESPIRATORY (INHALATION) at 21:09

## 2022-07-07 RX ADMIN — AZITHROMYCIN MONOHYDRATE 500 MG: 500 INJECTION, POWDER, LYOPHILIZED, FOR SOLUTION INTRAVENOUS at 19:54

## 2022-07-07 RX ADMIN — LEVETIRACETAM 500 MG: 500 TABLET, FILM COATED ORAL at 21:32

## 2022-07-07 RX ADMIN — IPRATROPIUM BROMIDE AND ALBUTEROL SULFATE 3 ML: .5; 3 SOLUTION RESPIRATORY (INHALATION) at 15:48

## 2022-07-07 RX ADMIN — ONDANSETRON 4 MG: 2 INJECTION INTRAMUSCULAR; INTRAVENOUS at 12:01

## 2022-07-07 RX ADMIN — BUDESONIDE 500 MCG: 0.5 INHALANT RESPIRATORY (INHALATION) at 21:08

## 2022-07-07 RX ADMIN — ENOXAPARIN SODIUM 40 MG: 100 INJECTION SUBCUTANEOUS at 19:55

## 2022-07-07 RX ADMIN — METHYLPREDNISOLONE SODIUM SUCCINATE 40 MG: 40 INJECTION, POWDER, FOR SOLUTION INTRAMUSCULAR; INTRAVENOUS at 21:32

## 2022-07-07 NOTE — PROGRESS NOTES
1932 - Assumed care at this time. 1948 - Assessment completed at this time. Pt A&Ox4, on 2L NC. Denies chest pain/SOB. Lung sounds clear. Pt c/o BROOKE and at rest. Pain rated 6/10, pain medication administered per [facility]. Pt educated on pain mgmt. No concerns voiced. Telephone and call bell within reach, bed in lowest position. Pt encouraged to call for assistance.

## 2022-07-07 NOTE — ED TRIAGE NOTES
Pt arrives ambulatory to ED with c\o SOB, CP, and leg pain x 3 days, pt is able to make needs known speaking in complete sentences, pt in nad at this time

## 2022-07-07 NOTE — PROGRESS NOTES
TRANSFER - IN REPORT:    Verbal report received from KELSY Davis(name) on Jarome Cassette  being received from [employer], RN  (unit) for routine progression of care      Report consisted of patients Situation, Background, Assessment and   Recommendations(SBAR). Information from the following report(s) SBAR, Kardex, Intake/Output and Recent Results was reviewed with the receiving nurse. Opportunity for questions and clarification was provided. Assessment completed upon patients arrival to unit and care assumed.

## 2022-07-07 NOTE — ED NOTES
Pt states she uses PRN O2 at home, but has been having to use it more often. PA aware and at bedside.  Pt to be admitted
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
## 2022-07-07 NOTE — ED NOTES
TRANSFER - OUT REPORT:    Verbal report given to allyson rn(name) on Guillaume Factor  being transferred to 306(unit) for routine progression of care       Report consisted of patients Situation, Background, Assessment and   Recommendations(SBAR). Information from the following report(s) SBAR, ED Summary, [facility] and Recent Results was reviewed with the receiving nurse. Lines:   Peripheral IV 07/07/22 Left Forearm (Active)   Site Assessment Clean, dry, & intact 07/07/22 1806   Phlebitis Assessment 0 07/07/22 1806   Infiltration Assessment 0 07/07/22 1806   Dressing Status Clean, dry, & intact 07/07/22 1806   Action Taken Blood drawn 07/07/22 1806        Opportunity for questions and clarification was provided.       Patient transported with:   Tech   O2 at 2L
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
## 2022-07-07 NOTE — ED NOTES
TRANSFER - OUT REPORT:    Verbal report given to allyson rn(name) on Garlan Epley  being transferred to 306(unit) for routine progression of care       Report consisted of patients Situation, Background, Assessment and   Recommendations(SBAR). Information from the following report(s) SBAR, ED Summary, [facility] and Recent Results was reviewed with the receiving nurse. Lines:   Peripheral IV 07/07/22 Left Forearm (Active)   Site Assessment Clean, dry, & intact 07/07/22 1806   Phlebitis Assessment 0 07/07/22 1806   Infiltration Assessment 0 07/07/22 1806   Dressing Status Clean, dry, & intact 07/07/22 1806   Action Taken Blood drawn 07/07/22 1806        Opportunity for questions and clarification was provided.       Patient transported with:   Tech   O2 at 2L

## 2022-07-07 NOTE — PROGRESS NOTES
TRANSFER - IN REPORT:    Verbal report received from Susan RN(name) on Van Meter Factor  being received from [facility], RN  (unit) for routine progression of care      Report consisted of patients Situation, Background, Assessment and   Recommendations(SBAR). Information from the following report(s) SBAR, Kardex, Intake/Output and Recent Results was reviewed with the receiving nurse. Opportunity for questions and clarification was provided. Assessment completed upon patients arrival to unit and care assumed.

## 2022-07-08 LAB
ANION GAP SERPL CALC-SCNC: 7 MMOL/L (ref 3–18)
BASOPHILS # BLD: 0 K/UL (ref 0–0.1)
BASOPHILS NFR BLD: 1 % (ref 0–2)
BUN SERPL-MCNC: 12 MG/DL (ref 7–18)
BUN/CREAT SERPL: 11 (ref 12–20)
CALCIUM SERPL-MCNC: 8.6 MG/DL (ref 8.5–10.1)
CHLORIDE SERPL-SCNC: 101 MMOL/L (ref 100–111)
CO2 SERPL-SCNC: 24 MMOL/L (ref 21–32)
CREAT SERPL-MCNC: 1.06 MG/DL (ref 0.6–1.3)
DIFFERENTIAL METHOD BLD: ABNORMAL
EOSINOPHIL # BLD: 0 K/UL (ref 0–0.4)
EOSINOPHIL NFR BLD: 0 % (ref 0–5)
ERYTHROCYTE [DISTWIDTH] IN BLOOD BY AUTOMATED COUNT: 17.9 % (ref 11.6–14.5)
EST. AVERAGE GLUCOSE BLD GHB EST-MCNC: 249 MG/DL
GLUCOSE BLD STRIP.AUTO-MCNC: 281 MG/DL (ref 70–110)
GLUCOSE BLD STRIP.AUTO-MCNC: 304 MG/DL (ref 70–110)
GLUCOSE BLD STRIP.AUTO-MCNC: 371 MG/DL (ref 70–110)
GLUCOSE BLD STRIP.AUTO-MCNC: 379 MG/DL (ref 70–110)
GLUCOSE SERPL-MCNC: 291 MG/DL (ref 74–99)
HBA1C MFR BLD: 10.3 % (ref 4.2–5.6)
HCT VFR BLD AUTO: 34.3 % (ref 35–45)
HGB BLD-MCNC: 10.7 G/DL (ref 12–16)
IMM GRANULOCYTES # BLD AUTO: 0 K/UL (ref 0–0.04)
IMM GRANULOCYTES NFR BLD AUTO: 1 % (ref 0–0.5)
LYMPHOCYTES # BLD: 0.7 K/UL (ref 0.9–3.6)
LYMPHOCYTES NFR BLD: 12 % (ref 21–52)
MCH RBC QN AUTO: 24.1 PG (ref 24–34)
MCHC RBC AUTO-ENTMCNC: 31.2 G/DL (ref 31–37)
MCV RBC AUTO: 77.3 FL (ref 78–100)
MONOCYTES # BLD: 0 K/UL (ref 0.05–1.2)
MONOCYTES NFR BLD: 1 % (ref 3–10)
NEUTS SEG # BLD: 4.8 K/UL (ref 1.8–8)
NEUTS SEG NFR BLD: 86 % (ref 40–73)
NRBC # BLD: 0 K/UL (ref 0–0.01)
NRBC BLD-RTO: 0 PER 100 WBC
PLATELET # BLD AUTO: 271 K/UL (ref 135–420)
PMV BLD AUTO: 11.8 FL (ref 9.2–11.8)
POTASSIUM SERPL-SCNC: 4.5 MMOL/L (ref 3.5–5.5)
RBC # BLD AUTO: 4.44 M/UL (ref 4.2–5.3)
SODIUM SERPL-SCNC: 132 MMOL/L (ref 136–145)
WBC # BLD AUTO: 5.6 K/UL (ref 4.6–13.2)

## 2022-07-08 PROCEDURE — 74011000250 HC RX REV CODE- 250: Performed by: HOSPITALIST

## 2022-07-08 PROCEDURE — 96376 TX/PRO/DX INJ SAME DRUG ADON: CPT

## 2022-07-08 PROCEDURE — 80048 BASIC METABOLIC PNL TOTAL CA: CPT

## 2022-07-08 PROCEDURE — 74011000258 HC RX REV CODE- 258: Performed by: HOSPITALIST

## 2022-07-08 PROCEDURE — 99222 1ST HOSP IP/OBS MODERATE 55: CPT | Performed by: NURSE PRACTITIONER

## 2022-07-08 PROCEDURE — 97161 PT EVAL LOW COMPLEX 20 MIN: CPT

## 2022-07-08 PROCEDURE — 74011636637 HC RX REV CODE- 636/637: Performed by: HOSPITALIST

## 2022-07-08 PROCEDURE — 74011250636 HC RX REV CODE- 250/636: Performed by: HOSPITALIST

## 2022-07-08 PROCEDURE — 36415 COLL VENOUS BLD VENIPUNCTURE: CPT

## 2022-07-08 PROCEDURE — 82962 GLUCOSE BLOOD TEST: CPT

## 2022-07-08 PROCEDURE — 74011250637 HC RX REV CODE- 250/637: Performed by: HOSPITALIST

## 2022-07-08 PROCEDURE — 65270000029 HC RM PRIVATE

## 2022-07-08 PROCEDURE — 97116 GAIT TRAINING THERAPY: CPT

## 2022-07-08 PROCEDURE — 83036 HEMOGLOBIN GLYCOSYLATED A1C: CPT

## 2022-07-08 PROCEDURE — 97165 OT EVAL LOW COMPLEX 30 MIN: CPT

## 2022-07-08 PROCEDURE — 94640 AIRWAY INHALATION TREATMENT: CPT

## 2022-07-08 PROCEDURE — 97535 SELF CARE MNGMENT TRAINING: CPT

## 2022-07-08 PROCEDURE — 85025 COMPLETE CBC W/AUTO DIFF WBC: CPT

## 2022-07-08 PROCEDURE — 77010033678 HC OXYGEN DAILY

## 2022-07-08 PROCEDURE — G0378 HOSPITAL OBSERVATION PER HR: HCPCS

## 2022-07-08 RX ORDER — AZITHROMYCIN 250 MG/1
500 TABLET, FILM COATED ORAL EVERY 24 HOURS
Status: DISCONTINUED | OUTPATIENT
Start: 2022-07-08 | End: 2022-07-12 | Stop reason: HOSPADM

## 2022-07-08 RX ADMIN — ARFORMOTEROL TARTRATE 15 MCG: 15 SOLUTION RESPIRATORY (INHALATION) at 07:36

## 2022-07-08 RX ADMIN — INSULIN LISPRO 15 UNITS: 100 INJECTION, SOLUTION INTRAVENOUS; SUBCUTANEOUS at 16:23

## 2022-07-08 RX ADMIN — INSULIN LISPRO 15 UNITS: 100 INJECTION, SOLUTION INTRAVENOUS; SUBCUTANEOUS at 22:37

## 2022-07-08 RX ADMIN — ATORVASTATIN CALCIUM 40 MG: 20 TABLET, FILM COATED ORAL at 21:19

## 2022-07-08 RX ADMIN — GABAPENTIN 300 MG: 300 CAPSULE ORAL at 08:23

## 2022-07-08 RX ADMIN — ASPIRIN 81 MG: 81 TABLET, CHEWABLE ORAL at 08:23

## 2022-07-08 RX ADMIN — LEVETIRACETAM 500 MG: 500 TABLET, FILM COATED ORAL at 20:56

## 2022-07-08 RX ADMIN — BUDESONIDE 500 MCG: 0.5 INHALANT RESPIRATORY (INHALATION) at 07:36

## 2022-07-08 RX ADMIN — GABAPENTIN 300 MG: 300 CAPSULE ORAL at 16:23

## 2022-07-08 RX ADMIN — GABAPENTIN 300 MG: 300 CAPSULE ORAL at 21:18

## 2022-07-08 RX ADMIN — AMLODIPINE BESYLATE 5 MG: 5 TABLET ORAL at 08:23

## 2022-07-08 RX ADMIN — ARFORMOTEROL TARTRATE 15 MCG: 15 SOLUTION RESPIRATORY (INHALATION) at 19:36

## 2022-07-08 RX ADMIN — OXYCODONE HYDROCHLORIDE AND ACETAMINOPHEN 1 TABLET: 5; 325 TABLET ORAL at 07:41

## 2022-07-08 RX ADMIN — CEFTRIAXONE 1 G: 1 INJECTION, POWDER, FOR SOLUTION INTRAMUSCULAR; INTRAVENOUS at 20:56

## 2022-07-08 RX ADMIN — LEVETIRACETAM 500 MG: 500 TABLET, FILM COATED ORAL at 08:23

## 2022-07-08 RX ADMIN — OXYCODONE HYDROCHLORIDE AND ACETAMINOPHEN 1 TABLET: 5; 325 TABLET ORAL at 13:49

## 2022-07-08 RX ADMIN — OXYCODONE HYDROCHLORIDE AND ACETAMINOPHEN 1 TABLET: 5; 325 TABLET ORAL at 21:10

## 2022-07-08 RX ADMIN — Medication 400 MG: at 08:23

## 2022-07-08 RX ADMIN — METHYLPREDNISOLONE SODIUM SUCCINATE 40 MG: 40 INJECTION, POWDER, FOR SOLUTION INTRAMUSCULAR; INTRAVENOUS at 20:57

## 2022-07-08 RX ADMIN — METHYLPREDNISOLONE SODIUM SUCCINATE 40 MG: 40 INJECTION, POWDER, FOR SOLUTION INTRAMUSCULAR; INTRAVENOUS at 08:24

## 2022-07-08 RX ADMIN — ATENOLOL 100 MG: 50 TABLET ORAL at 08:23

## 2022-07-08 RX ADMIN — ENOXAPARIN SODIUM 40 MG: 100 INJECTION SUBCUTANEOUS at 20:56

## 2022-07-08 RX ADMIN — FAMOTIDINE 20 MG: 20 TABLET, FILM COATED ORAL at 20:56

## 2022-07-08 RX ADMIN — BUDESONIDE 500 MCG: 0.5 INHALANT RESPIRATORY (INHALATION) at 19:36

## 2022-07-08 RX ADMIN — FAMOTIDINE 20 MG: 20 TABLET, FILM COATED ORAL at 08:23

## 2022-07-08 RX ADMIN — INSULIN GLARGINE 30 UNITS: 100 INJECTION, SOLUTION SUBCUTANEOUS at 08:24

## 2022-07-08 RX ADMIN — AZITHROMYCIN MONOHYDRATE 500 MG: 250 TABLET ORAL at 20:57

## 2022-07-08 RX ADMIN — OXYCODONE HYDROCHLORIDE AND ACETAMINOPHEN 1 TABLET: 5; 325 TABLET ORAL at 01:38

## 2022-07-08 RX ADMIN — INSULIN LISPRO 12 UNITS: 100 INJECTION, SOLUTION INTRAVENOUS; SUBCUTANEOUS at 11:28

## 2022-07-08 RX ADMIN — INSULIN LISPRO 9 UNITS: 100 INJECTION, SOLUTION INTRAVENOUS; SUBCUTANEOUS at 07:41

## 2022-07-08 NOTE — ACP (ADVANCE CARE PLANNING)
Advance Care Planning     General Advance Care Planning (ACP) Conversation    Date of Conversation: 7/8/2022  Conducted with: Patient with [address] Decision Maker:     Primary Decision Maker: Annalisa Vincentr - [phone]  Secondary Decision maker: Rosalio Borja - son- [phone]    Today we documented Decision Maker(s) consistent with NEWLY COMPLETED ACP documents on file. Content/Action Overview:   Has  NEWLY COMPLETED ACP document(s) on file - reflects the patient's care preferences  Reviewed DNR/DNI and patient elects Full Code (Attempt Resuscitation)    Length of Voluntary ACP Conversation in minutes:  <16 minutes (Non-Billable)     7/8/2022 1025 Seen today in room 306 along with Shery Hatchet, NP. Lying in bed with head of bed elevated. Awake, alert. Oriented x 4 Respirations unlabored. Oxygen on at 2 lpm per NC . Able to speak in full  sentences. Pain -- bilateral leg pain. Affect flat, frequently rubbing her legs. Voice quiet. Came to the ED on 7/7/2022 from home per POV for shortness of breath, chest pain, and leg pain for three days. Her leg pain is long term and often disabling in her description. It stops her from having a steady gait, makes her prone to falls, and impacts her sleep    Admitted to the medical unit for COPD exacerbation (IV solumedrol, inhalers, empiric antibiotics, CTA chest [-]), T2DM (lantus, sliding scale with goal of euglycemia), HTN (trend BPs, home medications), neuropathy (gabapentin, PT/OT), seizure history (keppra)    PMH (from record review) significant for COPD, asthma, T2DM, chronic neuropathic pain in her legs, DVT on eliquis, HTN, dyslipidemia, MI, seizure, stroke    The palliative care team has been consulted for goals of care discussion    Introduced the role of palliative medicine for the hospitalized patient. Lives in a single family home with her  but he is often gone with his work as a . Prior to admission, she was mostly independent in ADLs but acknowledges she has some difficulties. She does do the cooking and housework Uses a walker for ambulation assistance. Admits to 3 falls in 3 months. Asked if she had ever completed an AMD and she said that she had not. Explained the [place] of decision making. Offered the opportunity to complete an AMD and she agreed to same (see details below) naming her  and son, Jeannie Wood as her MPOAs. During that discussion she said that in the event of imminent death or if she was unaware to self, surroundings, or others, she would want comfort based care. She currently is accepting on intubation in the event or pre-arrest respiratory decline or cardiac resuscitation in the event of cardiac arrest. She was encouraged to continue conversations with her family about what she finds acceptable in terms of medical treatment. Disposition plan: anticipate she will return home with family support. Patient completed an advance medical directive today. Copy placed on chart for scanning into EMR and copy given to patient for personal records. Primary Decision Maker [facility] Agent): Geovanna Worley  Relationship to patient: spouse  Phone number: [phone]  [x] Named in a NEWLY COMPLETED scanned document   [] Legal Next of Kin  [] Guardian    Secondary Decision Maker ([address]): Dayanara Medina  Relationship to patient: son  Phone number:  [phone]  [x] Named in a NEWLY COMPLETED scanned document   [] Legal Next of Kin  [] Guardian    ACP documents you currently have include:  [x] Advance Directive or Living Will  [] Durable Do Not Resuscitate  [] Physician Orders for Scope of Treatment (POST)  [] Medical Power of   [] Other    After meeting with patient, the goals of care have been defined.   Code status remains: FULL CODE  AMD status: on file naming her , Geovanna Worley, and son, Dayanara Medina, as her MPOAs Will sign off but remain available for reconsult as needed/if appropriate.      Thank you for the Palliative Medicine consult and allowing us to participate in the care of Saba Sage RN, MSN  Palliative Medicine   [phone]

## 2022-07-08 NOTE — PROGRESS NOTES
2339  Bedside and verbal shift change report given to Sreekanth Tolliver RN (on coming nurse) by Fatemeh Dunbar RN (off going nurse). Report included the following information SBAR, Kardex, Intake/Output and MAR. Bedside and verbal shift change report given by KELSY Bauer (off going nurse) to Rosita Plummer RN(on coming nurse). Report included the following information SBAR, Kardex, Intake/Output and MAR. Here is an updated schedule for Dad given the changes and additions:      Wednesday, 9-2-2020:  PET CT scan:   arrival of 12:30 pm  12:30 pm:  check in on the ground level--Radiology  12:45 pm:  injection  1:30-2 pm:  scan time  Prep:  Nothing to eat or drink (with the exception of water) FOUR hours prior to the exam.  Avoid vigorous exercise for TWENTY FOUR hours prior to the study.           Friday, 9-4-2020:  Swallow Study:  arrival of 9:45 am  9:45 am:  check in at the  main   of the hospital      Wednesday, 9-9-2020:  check in 8:45 am  8:45 am:  check in for ENT follow up appointment with Dr. Billy Guerrero  Directions for [facility]:  We are located in the [facility] at [address], [address], [place], [place].  Our building is located across the street from [facility] and offers free parking in our on-site lot.  Please take the stairs or elevator to the second floor of the [facility] and check in at the  located in the Specialty Clinic, [address].        Thursday, 9-:  arrival of 8:30 am  8:30am:  check in 1st Floor Radiation Therapy  8:45 am:  follow up with Dr. Mendoza      Monday, 9-:  arrival of 8:45 am  8:45 am:  check in for speech therapy    Directions for [facility]:  Our address is [address] in the [facility] in [place].  Directly across the street from [facility] ([address] of [facility]), there is a cancer center; we are directly behind them in their back parking lot.  If coming from Highway , head east on [address] to [place].  Turn right on [address] [place], take your first left into the [facility].  Follow the road around to the left and we are in the center of the complex in [address].    If coming from [facility], turn west on [address] to [place].  Turn left on [address], go down approximately 1 block  and turn left into the [facility].  Follow the road around to the left and we are in the center of the complex in [address].    Landmarks: we are behind [place] [facility]        I hope this helps!  Let me know if you have any questions.  Also, you don t have to, but when Dad is done with his feeding tube and you have no use for his leftover formula and/or supplies, I work with the [employer] and distribute donations to patient who are in  need.  Some patients insurance won t cover formula and/or supplies.  No worries if you don t!    Take Care,  Alisha
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
## 2022-07-08 NOTE — PROGRESS NOTES
Hospitalist Progress Note    Patient: Emi Flores MRN: [id number]  CSN: [id number]    YOB: 1945  Age: 77 y.o. Sex: female    DOA: 7/7/2022 LOS:  LOS: 1 day                Assessment/Plan     Patient Active Problem List   Diagnosis Code    HTN (hypertension), benign I10    Neuropathy G62.9    Hyperlipidemia E78.5    COPD (chronic obstructive pulmonary disease) ([facility] [address] J44.9    CVA (cerebral vascular accident) ([facility] [address] I63.9    Bilateral leg pain M79.604, M79.605    SOB (shortness of breath) R06.02    Hypoxemia R09.02    COPD with acute exacerbation (HCC) J44.1    Neuropathic pain, leg M79.2    Seizures (HCC) R56.9    Diabetes ([facility] [address] E11.9        Chief complaint :  SOB  77 y.o. female with diabetes, hypertension, COPD,  history of stroke, seizures who presents to ER with concerns of chest pain, shortness of breath and bilateral leg pain. COPD exacerbation -  continue on solumedrol  [address] treatment as needed. Empiric antibiotics ceftriaxone, azithromycin. CTA chest with No evidence of pulmonary embolism or acute intrathoracic abnormality.     DM -  Continue with lantus  Start on SSI, ADA diet     HTN -  Continue home meds,  Monitor BP     Seizures -  Continue with keppra     Neuropathy -  Continue with Neurontin.     DVT prophylaxis with lovenox.     PT/OT    Disposition :  1-2 days    Review of systems  General: No fevers or chills. Cardiovascular: No chest pain or pressure. No palpitations. Pulmonary: No shortness of breath. Gastrointestinal: No nausea, vomiting. Physical Exam:  General: Awake, cooperative, no acute distress    HEENT: NC, Atraumatic. PERRLA, anicteric sclerae. Lungs: CTA Bilaterally. No Wheezing/Rhonchi/Rales. Heart:  S1 S2,  No murmur, No Rubs, No Gallops  Abdomen: Soft, Non distended, Non tender.  +Bowel sounds,   Extremities: No c/c/e  Psych:   Not anxious or agitated. Neurologic:  No acute neurological deficit.            Vital signs/Intake and Output:  Visit Vitals  BP (!) 117/55 (BP 1 Location: Right upper arm, BP Patient Position: At rest)   Pulse 67   Temp 98.5 °F (36.9 °C)   Resp 18   Ht 5' 4\" (1.626 m)   Wt 91.2 kg (201 lb)   SpO2 97%   BMI 34.50 kg/m²     Current Shift:  07/08 0701 - 07/08 1900  In: -   Out: 900 [Urine:900]  Last three shifts:  07/06 1901 - 07/08 0700  In: 0   Out: 800 [Urine:800]            Labs: Results:       Chemistry Recent Labs     07/08/22  0125 07/07/22  1025   * 191*   * 138   K 4.5 3.7    106   CO2 24 25   BUN 12 10   CREA 1.06 0.82   CA 8.6 9.2   AGAP 7 7   BUCR 11* 12   AP  --  85   TP  --  7.9   ALB  --  3.4   GLOB  --  4.5*   AGRAT  --  0.8      CBC w/Diff Recent Labs     07/08/22  0125 07/07/22  1025   WBC 5.6 5.1   RBC 4.44 4.54   HGB 10.7* 10.8*   HCT 34.3* 35.2    296   GRANS 86* 44   LYMPH 12* 31   EOS 0 14*      Cardiac Enzymes No results for input(s): CPK, CKND1, MAGGIE in the last 72 hours. No lab exists for component: CKRMB, TROIP   Coagulation Recent Labs     07/07/22  1025   PTP 14.3   INR 1.1   APTT 28.6       Lipid Panel Lab Results   Component Value Date/Time    Cholesterol, total 127 07/28/2018 06:06 AM    HDL Cholesterol 35 (L) 07/28/2018 06:06 AM    LDL, calculated 61.6 07/28/2018 06:06 AM    VLDL, calculated 30.4 07/28/2018 06:06 AM    Triglyceride 152 (H) 07/28/2018 06:06 AM    CHOL/HDL Ratio 3.6 07/28/2018 06:06 AM      BNP No results for input(s): BNPP in the last 72 hours.    Liver Enzymes Recent Labs     07/07/22  1025   TP 7.9   ALB 3.4   AP 85      Thyroid Studies Lab Results   Component Value Date/Time    TSH 5.60 (H) 02/25/2019 05:50 AM        Procedures/imaging: see electronic medical records for all procedures/Xrays and details which were not copied into this note but were reviewed prior to creation of Plan

## 2022-07-08 NOTE — DIABETES MGMT
Diabetes/ Glycemic Control Plan of Care  Recommendations:   A1c ordered per protocol  Continue 30 units Lantus and corrective insulin. Continue to monitor and adjust basal regimen as needed to achieve glycemic targets (70-180mg/dl)    Assessment: Patient has a known history of diabetes. Solumedrol 40 mg q 12 hours, steroid induced hyperglycemia. Patient was started on 30 units basal insulin this morning. BG ranging 191-291 mg/dl over the last 24 hours. Patient reports compliance with her home regimen of 30 units Lantus and Metformin. She monitors her BG 3 times per day. Unsure of most recent A1c at this time. DX:   1. Acute exacerbation of chronic obstructive pulmonary disease (COPD) ([facility] [address]     2. Atypical chest pain     3. Neuropathic pain of both legs     4. Hypoxemia        Steroids:   Rx Glucocorticoids (24h ago, onward)             Start     Dose Route Frequency Ordered Stop    07/07/22 2100  methylPREDNISolone (PF) (SOLU-MEDROL) injection 40 mg         40 mg IV EVERY 12 HOURS 07/07/22 1905 --               Recent Glucose Results:   Lab Results   Component Value Date/Time     (H) 07/08/2022 01:25 AM     (H) 07/07/2022 10:25 AM    GLUCPOC 281 (H) 07/08/2022 07:24 AM    GLUCPOC 278 (H) 07/07/2022 10:32 PM          BG within target range (non-ICU: <180; -180):  [] Yes    [x] No   Current insulin orders: 30 units Lantus, corrective Humalog  Total Daily Dose previous 24 hours =45 units (30 units Lantus + 15 units Humalog)  Current A1c: Ordered   Nutrition/Diet: ADULT DIET Regular; 4 carb choices (60 gm/meal); Low Sodium (2 gm)     Home diabetes medications:   Key Antihyperglycemic Medications             metFORMIN (GLUCOPHAGE) 850 mg tablet (Taking) Take 1 Tab by mouth three (3) times daily. insulin glargine (LANTUS) 100 unit/mL injection (Taking) 30 Units by SubCUTAneous route daily.         Plan/Goals:   Blood glucose will be within target of 70 - 180 mg/dl within 72 hours  Reinforce dietary and medication compliance at home.           Education:  [] Refer to Diabetes Education Record                       [x] Education not indicated at this time     Devyn Small RD  Glycemic Control Team  [phone]    Monday-Friday   9 am - 3 pm

## 2022-07-08 NOTE — ROUTINE PROCESS
Bedside shift change report given to Fabio Cortze RN (oncoming nurse) by Jessica Palmer RN   (offgoing nurse). Report included the following information SBAR, Kardex, Intake/Output, MAR and Recent Results.
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
## 2022-07-08 NOTE — PROGRESS NOTES
Assumed care of patient, on 2 liters O2 n/c-pt states her breathing today is better than yesterday. Lungs clear/diminished. C/O pain in bilateral legs, medicated as ordered.

## 2022-07-08 NOTE — PROGRESS NOTES
2339  Bedside and verbal shift change report given to Jessica Mott RN (on coming nurse) by Renzo Cisneros RN (off going nurse). Report included the following information SBAR, Kardex, Intake/Output and MAR. Bedside and verbal shift change report given by KELSY Bauer (off going nurse) to Irena Ramos RN(on coming nurse). Report included the following information SBAR, Kardex, Intake/Output and MAR.

## 2022-07-08 NOTE — PROGRESS NOTES
conducted an initial consultation and Spiritual Assessment for Yokasta Meadows, who is a 77 y.o.,female. Patients Primary Language is: [place]. According to the patients EMR Jain Affiliation is: Jehovah witness. The reason the Patient came to the hospital is:   Patient Active Problem List    Diagnosis Date Noted    Hypoxemia 07/07/2022    COPD with acute exacerbation [facility] 07/07/2022    Neuropathic pain, leg 07/07/2022    Seizures ([facility]     Diabetes ([facility]     Bilateral leg pain 08/12/2021    SOB (shortness of breath) 08/12/2021    CVA (cerebral vascular accident) ([facility] 07/27/2018    [facility] 02/22[facility]2017    HTN (hypertension), benign     Neuropathy     Hyperlipidemia         The  provided the following Interventions:  Initiated a relationship of care and support. Explored issues of dev, belief, spirituality and Yazdanism/ritual needs while hospitalized. Listened empathically. Provided chaplaincy education. Provided information about Spiritual Care Services. Offered assurance of continued prayers on patients behalf. Chart reviewed. The following outcomes were achieved:  Patient shared limited information about both their medical narrative and spiritual journey/beliefs. Patient processed feeling about current hospitalization. Patient expressed gratitude for pastoral care visit. Assessment:  Patient does not have any Yazdanism/cultural needs that will affect patients preferences in health care. There are no further spiritual or Yazdanism issues which require intervention at this time. Plan:  Chaplains will continue to follow and will provide pastoral care on an as needed/requested basis.  recommends bedside caregivers page  on duty if patient shows signs of acute spiritual or emotional distress.       Sister Pushpa Maes, [place], [address]  [phone]

## 2022-07-08 NOTE — ACP (ADVANCE CARE PLANNING)
Advance Care Planning     General Advance Care Planning (ACP) Conversation    Date of Conversation: 7/8/2022  Conducted with: Patient with [address] Decision Maker:     Primary Decision Maker: Florence Luke - [phone]  Secondary Decision maker: Nora Kocher - son- [phone]    Today we documented Decision Maker(s) consistent with NEWLY COMPLETED ACP documents on file. Content/Action Overview:   Has  NEWLY COMPLETED ACP document(s) on file - reflects the patient's care preferences  Reviewed DNR/DNI and patient elects Full Code (Attempt Resuscitation)    Length of Voluntary ACP Conversation in minutes:  <16 minutes (Non-Billable)     7/8/2022 1025 Seen today in room 306 along with Clint Roper NP. Lying in bed with head of bed elevated. Awake, alert. Oriented x 4 Respirations unlabored. Oxygen on at 2 lpm per NC . Able to speak in full  sentences. Pain -- bilateral leg pain. Affect flat, frequently rubbing her legs. Voice quiet. Came to the ED on 7/7/2022 from home per POV for shortness of breath, chest pain, and leg pain for three days. Her leg pain is long term and often disabling in her description. It stops her from having a steady gait, makes her prone to falls, and impacts her sleep    Admitted to the medical unit for COPD exacerbation (IV solumedrol, inhalers, empiric antibiotics, CTA chest [-]), T2DM (lantus, sliding scale with goal of euglycemia), HTN (trend BPs, home medications), neuropathy (gabapentin, PT/OT), seizure history (keppra)    PMH (from record review) significant for COPD, asthma, T2DM, chronic neuropathic pain in her legs, DVT on eliquis, HTN, dyslipidemia, MI, seizure, stroke    The palliative care team has been consulted for goals of care discussion    Introduced the role of palliative medicine for the hospitalized patient. Lives in a single family home with her  but he is often gone with his work as a . Prior to admission, she was mostly independent in ADLs but acknowledges she has some difficulties. She does do the cooking and housework Uses a walker for ambulation assistance. Admits to 3 falls in 3 months. Asked if she had ever completed an AMD and she said that she had not. Explained the [place] of decision making. Offered the opportunity to complete an AMD and she agreed to same (see details below) naming her  and son, Blayne Kirk as her MPOAs. During that discussion she said that in the event of imminent death or if she was unaware to self, surroundings, or others, she would want comfort based care. She currently is accepting on intubation in the event or pre-arrest respiratory decline or cardiac resuscitation in the event of cardiac arrest. She was encouraged to continue conversations with her family about what she finds acceptable in terms of medical treatment. Disposition plan: anticipate she will return home with family support. Patient completed an advance medical directive today. Copy placed on chart for scanning into EMR and copy given to patient for personal records. Primary Decision Maker [facility] Agent): Monalisa Garcia  Relationship to patient: spouse  Phone number: [phone]  [x] Named in a NEWLY COMPLETED scanned document   [] Legal Next of Kin  [] Guardian    Secondary Decision Maker ([address]): Yoandy Ordonez  Relationship to patient: son  Phone number:  [phone]  [x] Named in a NEWLY COMPLETED scanned document   [] Legal Next of Kin  [] Guardian    ACP documents you currently have include:  [x] Advance Directive or Living Will  [] Durable Do Not Resuscitate  [] Physician Orders for Scope of Treatment (POST)  [] Medical Power of   [] Other    After meeting with patient, the goals of care have been defined.   Code status remains: FULL CODE  AMD status: on file naming her , Monalisa Garcia, and son, Yoandy Ordonez, as her MPOAs Will sign off but remain available for reconsult as needed/if appropriate.      Thank you for the Palliative Medicine consult and allowing us to participate in the care of Francisco Javier Villar RN, MSN  Palliative Medicine   [phone]

## 2022-07-08 NOTE — PROGRESS NOTES
[facility]: The pharmacist has determined that this patient meets P & T approved criteria for conversion from IV to oral therapy for the following medication:  Azithromycin    The pharmacist has initiated the following order:  Azithromycin 500 mg PO q24hrs ( x 6 more doses for total of 7 )  The pharmacist will continue to monitor the patient's status for appropriateness    of oral therapy. If the patient no longer meets all criteria for oral therapy, the pharmacist will switch back     to IV therapy.     Mary Riley, [facility], [facility]   7/8/2022 12:05 PM

## 2022-07-08 NOTE — PROGRESS NOTES
conducted an initial consultation and Spiritual Assessment for Freddie Acuña, who is a 77 y.o.,female. Patients Primary Language is: [place]. According to the patients EMR Yarsanism Affiliation is: Jehovah witness. The reason the Patient came to the hospital is:   Patient Active Problem List    Diagnosis Date Noted    Hypoxemia 07/07/2022    COPD with acute exacerbation ([facility] 07/07/2022    Neuropathic pain, leg 07/07/2022    Seizures ([facility]     Diabetes ([facility]     Bilateral leg pain 08/12/2021    SOB (shortness of breath) 08/12/2021    CVA (cerebral vascular accident) ([facility] 07/27/2018    [facility]    HTN (hypertension), benign     Neuropathy     Hyperlipidemia         The  provided the following Interventions:  Initiated a relationship of care and support. Explored issues of dev, belief, spirituality and Anabaptist/ritual needs while hospitalized. Listened empathically. Provided chaplaincy education. Provided information about Spiritual Care Services. Offered assurance of continued prayers on patients behalf. Chart reviewed. The following outcomes were achieved:  Patient shared limited information about both their medical narrative and spiritual journey/beliefs. Patient processed feeling about current hospitalization. Patient expressed gratitude for pastoral care visit. Assessment:  Patient does not have any Anabaptist/cultural needs that will affect patients preferences in health care. There are no further spiritual or Anabaptist issues which require intervention at this time. Plan:  Chaplains will continue to follow and will provide pastoral care on an as needed/requested basis.  recommends bedside caregivers page  on duty if patient shows signs of acute spiritual or emotional distress.       Sister Genevieve Kirk, [place], [address]  [phone]

## 2022-07-08 NOTE — ROUTINE PROCESS
Bedside shift change report given to Rhea Germain (oncoming nurse) by Bryant Hollins RN (offgoing nurse). Report included the following information SBAR, Kardex, Intake/Output, MAR and Recent Results.

## 2022-07-08 NOTE — ROUTINE PROCESS
Bedside shift change report given to Rhea Rn (oncoming nurse) by Juanis Watkins RN (offgoing nurse). Report included the following information SBAR, Kardex, Intake/Output, MAR and Recent Results.

## 2022-07-08 NOTE — DIABETES MGMT
Diabetes/ Glycemic Control Plan of Care  Recommendations:   A1c ordered per protocol  Continue 30 units Lantus and corrective insulin. Continue to monitor and adjust basal regimen as needed to achieve glycemic targets (70-180mg/dl)    Assessment: Patient has a known history of diabetes. Solumedrol 40 mg q 12 hours, steroid induced hyperglycemia. Patient was started on 30 units basal insulin this morning. BG ranging 191-291 mg/dl over the last 24 hours. Patient reports compliance with her home regimen of 30 units Lantus and Metformin. She monitors her BG 3 times per day. Unsure of most recent A1c at this time. DX:   1. Acute exacerbation of chronic obstructive pulmonary disease (COPD) ([facility] [address]     2. Atypical chest pain     3. Neuropathic pain of both legs     4. Hypoxemia        Steroids:   Rx Glucocorticoids (24h ago, onward)             Start     Dose Route Frequency Ordered Stop    07/07/22 2100  methylPREDNISolone (PF) (SOLU-MEDROL) injection 40 mg         40 mg IV EVERY 12 HOURS 07/07/22 1905 --               Recent Glucose Results:   Lab Results   Component Value Date/Time     (H) 07/08/2022 01:25 AM     (H) 07/07/2022 10:25 AM    GLUCPOC 281 (H) 07/08/2022 07:24 AM    GLUCPOC 278 (H) 07/07/2022 10:32 PM          BG within target range (non-ICU: <180; -180):  [] Yes    [x] No   Current insulin orders: 30 units Lantus, corrective Humalog  Total Daily Dose previous 24 hours =45 units (30 units Lantus + 15 units Humalog)  Current A1c: Ordered   Nutrition/Diet: ADULT DIET Regular; 4 carb choices (60 gm/meal); Low Sodium (2 gm)     Home diabetes medications:   Key Antihyperglycemic Medications             metFORMIN (GLUCOPHAGE) 850 mg tablet (Taking) Take 1 Tab by mouth three (3) times daily. insulin glargine (LANTUS) 100 unit/mL injection (Taking) 30 Units by SubCUTAneous route daily.         Plan/Goals:   Blood glucose will be within target of 70 - 180 mg/dl within 72 hours  Reinforce dietary and medication compliance at home.           Education:  [] Refer to Diabetes Education Record                       [x] Education not indicated at this time     Jose Angel Estrada RD  Glycemic Control Team  [phone]    Monday-Friday   9 am - 3 pm

## 2022-07-08 NOTE — PROGRESS NOTES
Reason for Admission:   COPD with acute exacerbation ([facility] [address] [J44.1]  Hypoxemia [R09.02]  Neuropathic pain, leg [M79.2]    PCP: Noy Felder MD    There are currently no Active Isolations  No active infections                RUR Score:     Low; 10%             Resources/supports,as identified by patient/family:         Barriers to discharge:  N/A         Plan for utilizing home health:    yes                          Likelihood of readmission:            Transition of Care Plan:   [facility] with physician follow up               CM met with pt at bedside to discuss care transition. Pt is  and her  is a  and is not always home to assist this pt. Pt has a RW and a cane available if needed. CM discussed HH as an option. Pt has indicated she has used [facility] In the past and would like to use this agency again. CMS has been notified to assist.  Provider has indicated pt will remain inpt through the weekend. CM to continue to follow and assist.    Initial assessment completed with patient. Cognitive status of patient: oriented to time, place, person and situation. Face sheet information confirmed:  yes. The patient designates her  to participate in her discharge plan and to receive any needed information. This patient lives in a single family home with patient and spouse. Patient is not able to navigate steps as needed. Prior to hospitalization, patient was considered to be independent with ADLs/IADLS : yes . The patient and spouse will be available to transport patient home upon discharge. The patient already has a RW and a cane available in the home. Patient is not currently active with home health. This patient is on dialysis/days :no    List of available Home Health agencies were provided and reviewed with the patient prior to discharge. Freedom of choice signed: yes, for [facility].  Currently, the discharge plan is Home with Home Health. The patient states that she can obtain her medications from the pharmacy, and take her medications as directed. Patient's current insurance is Payor: BLUE CROSS MEDICARE / Plan: [id number] [employer] [facility] / Product Type: Managed Care Medicare /        Care Management Interventions  Mode of Transport at Discharge:  Other (see comment) (Family)  Transition of Care Consult (CM Consult): Discharge [address]: No  Health Maintenance Reviewed: Yes  Physical Therapy Consult: Yes  Occupational Therapy Consult: Yes  Support Systems: Spouse/Significant Other  The Plan for Transition of Care is Related to the Following Treatment Goals : [facility] with physician follow up  The Patient and/or Patient Representative was Provided with a Choice of Provider and Agrees with the Discharge Plan?: Yes  Name of the Patient Representative Who was Provided with a Choice of Provider and Agrees with the Discharge Plan: pt  Freedom of Choice List was Provided with Basic Dialogue that Supports the Patient's Individualized Plan of Care/Goals, Treatment Preferences and Shares the Quality Data Associated with the Providers?: Yes  Discharge Location  Patient Expects to be Discharged to[de-identified] Home with home health

## 2022-07-08 NOTE — PROGRESS NOTES
Problem: Pressure Injury - Risk of  Goal: *Prevention of pressure injury  Description: Document Ryne Scale and appropriate interventions in the flowsheet. Outcome: Progressing Towards Goal  Note: Pressure Injury Interventions:  Sensory Interventions: Assess changes in LOC    Moisture Interventions: Absorbent underpads    Activity Interventions: Pressure redistribution bed/mattress(bed type)    Mobility Interventions: Pressure redistribution bed/mattress (bed type)    Nutrition Interventions: Document food/fluid/supplement intake    Friction and Shear Interventions: Minimize layers                Problem: Falls - Risk of  Goal: *Absence of Falls  Description: Document Satinder Fall Risk and appropriate interventions in the flowsheet. Outcome: Progressing Towards Goal  Note: Fall Risk Interventions:  Mobility Interventions: Assess mobility with egress test         Medication Interventions: Teach patient to arise slowly    Elimination Interventions: Call light in reach    History of Falls Interventions:  Investigate reason for fall

## 2022-07-08 NOTE — PROGRESS NOTES
[facility]: The pharmacist has determined that this patient meets P & T approved criteria for conversion from IV to oral therapy for the following medication:  Azithromycin    The pharmacist has initiated the following order:  Azithromycin 500 mg PO q24hrs ( x 6 more doses for total of 7 )  The pharmacist will continue to monitor the patient's status for appropriateness    of oral therapy. If the patient no longer meets all criteria for oral therapy, the pharmacist will switch back     to IV therapy.     Sabra Pinzon, [facility], [facility]   7/8/2022 12:05 PM

## 2022-07-08 NOTE — PROGRESS NOTES
Reason for Admission:   COPD with acute exacerbation ([facility] [address] [J44.1]  Hypoxemia [R09.02]  Neuropathic pain, leg [M79.2]    PCP: Rosalia Phillips MD    There are currently no Active Isolations  No active infections                RUR Score:     Low; 10%             Resources/supports,as identified by patient/family:         Barriers to discharge:  N/A         Plan for utilizing home health:    yes                          Likelihood of readmission:            Transition of Care Plan:   [facility] with physician follow up               CM met with pt at bedside to discuss care transition. Pt is  and her  is a  and is not always home to assist this pt. Pt has a RW and a cane available if needed. CM discussed HH as an option. Pt has indicated she has used [facility] In the past and would like to use this agency again. CMS has been notified to assist.  Provider has indicated pt will remain inpt through the weekend. CM to continue to follow and assist.    Initial assessment completed with patient. Cognitive status of patient: oriented to time, place, person and situation. Face sheet information confirmed:  yes. The patient designates her  to participate in her discharge plan and to receive any needed information. This patient lives in a single family home with patient and spouse. Patient is not able to navigate steps as needed. Prior to hospitalization, patient was considered to be independent with ADLs/IADLS : yes . The patient and spouse will be available to transport patient home upon discharge. The patient already has a RW and a cane available in the home. Patient is not currently active with home health. This patient is on dialysis/days :no    List of available Home Health agencies were provided and reviewed with the patient prior to discharge. Freedom of choice signed: yes, for [facility].  Currently, the discharge plan is Home with Home Health. The patient states that she can obtain her medications from the pharmacy, and take her medications as directed. Patient's current insurance is Payor: BLUE CROSS MEDICARE / Plan: [id number] [employer] [employer] HMO / Product Type: Managed Care Medicare /        Care Management Interventions  Mode of Transport at Discharge:  Other (see comment) (Family)  Transition of Care Consult (CM Consult): Discharge [address]: No  Health Maintenance Reviewed: Yes  Physical Therapy Consult: Yes  Occupational Therapy Consult: Yes  Support Systems: Spouse/Significant Other  The Plan for Transition of Care is Related to the Following Treatment Goals : [facility] [place] with physician follow up  The Patient and/or Patient Representative was Provided with a Choice of Provider and Agrees with the Discharge Plan?: Yes  Name of the Patient Representative Who was Provided with a Choice of Provider and Agrees with the Discharge Plan: pt  Freedom of Choice List was Provided with Basic Dialogue that Supports the Patient's Individualized Plan of Care/Goals, Treatment Preferences and Shares the Quality Data Associated with the Providers?: Yes  Discharge Location  Patient Expects to be Discharged to[de-identified] Home with home health

## 2022-07-08 NOTE — ROUTINE PROCESS
Bedside shift change report given to Yisel Deal RN (oncoming nurse) by Julio Cabello RN   (offgoing nurse). Report included the following information SBAR, Kardex, Intake/Output, MAR and Recent Results.
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
## 2022-07-08 NOTE — PROGRESS NOTES
Bedside and Verbal shift change report given to John Paul Ortega RN by Jesse Atkins RN. Report included the following information SBAR, Kardex, Intake/Output and MAR.

## 2022-07-08 NOTE — PROGRESS NOTES
Hospitalist Progress Note    Patient: Abbey Sizer MRN: [id number]  CSN: [id number]    YOB: 1945  Age: 77 y.o. Sex: female    DOA: 7/7/2022 LOS:  LOS: 1 day                Assessment/Plan     Patient Active Problem List   Diagnosis Code    HTN (hypertension), benign I10    Neuropathy G62.9    Hyperlipidemia E78.5    COPD (chronic obstructive pulmonary disease) ([facility] [address] J44.9    CVA (cerebral vascular accident) ([facility] [address] I63.9    Bilateral leg pain M79.604, M79.605    SOB (shortness of breath) R06.02    Hypoxemia R09.02    COPD with acute exacerbation (HCC) J44.1    Neuropathic pain, leg M79.2    Seizures (HCC) R56.9    Diabetes ([facility] [address] E11.9        Chief complaint :  SOB  77 y.o. female with diabetes, hypertension, COPD,  history of stroke, seizures who presents to ER with concerns of chest pain, shortness of breath and bilateral leg pain. COPD exacerbation -  continue on solumedrol  [address] treatment as needed. Empiric antibiotics ceftriaxone, azithromycin. CTA chest with No evidence of pulmonary embolism or acute intrathoracic abnormality.     DM -  Continue with lantus  Start on SSI, ADA diet     HTN -  Continue home meds,  Monitor BP     Seizures -  Continue with keppra     Neuropathy -  Continue with Neurontin.     DVT prophylaxis with lovenox.     PT/OT    Disposition :  1-2 days    Review of systems  General: No fevers or chills. Cardiovascular: No chest pain or pressure. No palpitations. Pulmonary: No shortness of breath. Gastrointestinal: No nausea, vomiting. Physical Exam:  General: Awake, cooperative, no acute distress    HEENT: NC, Atraumatic. PERRLA, anicteric sclerae. Lungs: CTA Bilaterally. No Wheezing/Rhonchi/Rales. Heart:  S1 S2,  No murmur, No Rubs, No Gallops  Abdomen: Soft, Non distended, Non tender.  +Bowel sounds,   Extremities: No c/c/e  Psych:   Not anxious or agitated. Neurologic:  No acute neurological deficit.            Vital signs/Intake and Output:  Visit Vitals  BP (!) 117/55 (BP 1 Location: Right upper arm, BP Patient Position: At rest)   Pulse 67   Temp 98.5 °F (36.9 °C)   Resp 18   Ht 5' 4\" (1.626 m)   Wt 91.2 kg (201 lb)   SpO2 97%   BMI 34.50 kg/m²     Current Shift:  07/08 0701 - 07/08 1900  In: -   Out: 900 [Urine:900]  Last three shifts:  07/06 1901 - 07/08 0700  In: 0   Out: 800 [Urine:800]            Labs: Results:       Chemistry Recent Labs     07/08/22  0125 07/07/22  1025   * 191*   * 138   K 4.5 3.7    106   CO2 24 25   BUN 12 10   CREA 1.06 0.82   CA 8.6 9.2   AGAP 7 7   BUCR 11* 12   AP  --  85   TP  --  7.9   ALB  --  3.4   GLOB  --  4.5*   AGRAT  --  0.8      CBC w/Diff Recent Labs     07/08/22  0125 07/07/22  1025   WBC 5.6 5.1   RBC 4.44 4.54   HGB 10.7* 10.8*   HCT 34.3* 35.2    296   GRANS 86* 44   LYMPH 12* 31   EOS 0 14*      Cardiac Enzymes No results for input(s): CPK, CKND1, MAGGIE in the last 72 hours. No lab exists for component: CKRMB, TROIP   Coagulation Recent Labs     07/07/22  1025   PTP 14.3   INR 1.1   APTT 28.6       Lipid Panel Lab Results   Component Value Date/Time    Cholesterol, total 127 07/28/2018 06:06 AM    HDL Cholesterol 35 (L) 07/28/2018 06:06 AM    LDL, calculated 61.6 07/28/2018 06:06 AM    VLDL, calculated 30.4 07/28/2018 06:06 AM    Triglyceride 152 (H) 07/28/2018 06:06 AM    CHOL/HDL Ratio 3.6 07/28/2018 06:06 AM      BNP No results for input(s): BNPP in the last 72 hours.    Liver Enzymes Recent Labs     07/07/22  1025   TP 7.9   ALB 3.4   AP 85      Thyroid Studies Lab Results   Component Value Date/Time    TSH 5.60 (H) 02/25/2019 05:50 AM        Procedures/imaging: see electronic medical records for all procedures/Xrays and details which were not copied into this note but were reviewed prior to creation of Plan

## 2022-07-08 NOTE — PROGRESS NOTES
Bedside and Verbal shift change report given to John Paul Ortega RN by Bruce Bower RN. Report included the following information SBAR, Kardex, Intake/Output and MAR.
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
## 2022-07-09 LAB
ANION GAP SERPL CALC-SCNC: 6 MMOL/L (ref 3–18)
BASOPHILS # BLD: 0 K/UL (ref 0–0.1)
BASOPHILS NFR BLD: 0 % (ref 0–2)
BUN SERPL-MCNC: 21 MG/DL (ref 7–18)
BUN/CREAT SERPL: 22 (ref 12–20)
CALCIUM SERPL-MCNC: 8.5 MG/DL (ref 8.5–10.1)
CHLORIDE SERPL-SCNC: 104 MMOL/L (ref 100–111)
CO2 SERPL-SCNC: 24 MMOL/L (ref 21–32)
CREAT SERPL-MCNC: 0.97 MG/DL (ref 0.6–1.3)
DIFFERENTIAL METHOD BLD: ABNORMAL
EOSINOPHIL # BLD: 0 K/UL (ref 0–0.4)
EOSINOPHIL NFR BLD: 0 % (ref 0–5)
ERYTHROCYTE [DISTWIDTH] IN BLOOD BY AUTOMATED COUNT: 17.7 % (ref 11.6–14.5)
GLUCOSE BLD STRIP.AUTO-MCNC: 326 MG/DL (ref 70–110)
GLUCOSE BLD STRIP.AUTO-MCNC: 328 MG/DL (ref 70–110)
GLUCOSE BLD STRIP.AUTO-MCNC: 375 MG/DL (ref 70–110)
GLUCOSE BLD STRIP.AUTO-MCNC: 381 MG/DL (ref 70–110)
GLUCOSE BLD STRIP.AUTO-MCNC: 381 MG/DL (ref 70–110)
GLUCOSE BLD STRIP.AUTO-MCNC: 399 MG/DL (ref 70–110)
GLUCOSE BLD STRIP.AUTO-MCNC: 401 MG/DL (ref 70–110)
GLUCOSE BLD STRIP.AUTO-MCNC: 410 MG/DL (ref 70–110)
GLUCOSE BLD STRIP.AUTO-MCNC: 427 MG/DL (ref 70–110)
GLUCOSE BLD STRIP.AUTO-MCNC: 431 MG/DL (ref 70–110)
GLUCOSE SERPL-MCNC: 318 MG/DL (ref 74–99)
HCT VFR BLD AUTO: 34.5 % (ref 35–45)
HGB BLD-MCNC: 10.5 G/DL (ref 12–16)
IMM GRANULOCYTES # BLD AUTO: 0 K/UL (ref 0–0.04)
IMM GRANULOCYTES NFR BLD AUTO: 0 % (ref 0–0.5)
LYMPHOCYTES # BLD: 0.8 K/UL (ref 0.9–3.6)
LYMPHOCYTES NFR BLD: 7 % (ref 21–52)
MCH RBC QN AUTO: 23.6 PG (ref 24–34)
MCHC RBC AUTO-ENTMCNC: 30.4 G/DL (ref 31–37)
MCV RBC AUTO: 77.5 FL (ref 78–100)
MONOCYTES # BLD: 0.3 K/UL (ref 0.05–1.2)
MONOCYTES NFR BLD: 2 % (ref 3–10)
NEUTS SEG # BLD: 10.2 K/UL (ref 1.8–8)
NEUTS SEG NFR BLD: 91 % (ref 40–73)
NRBC # BLD: 0 K/UL (ref 0–0.01)
NRBC BLD-RTO: 0 PER 100 WBC
PLATELET # BLD AUTO: 303 K/UL (ref 135–420)
PMV BLD AUTO: 11 FL (ref 9.2–11.8)
POTASSIUM SERPL-SCNC: 4.9 MMOL/L (ref 3.5–5.5)
RBC # BLD AUTO: 4.45 M/UL (ref 4.2–5.3)
SODIUM SERPL-SCNC: 134 MMOL/L (ref 136–145)
WBC # BLD AUTO: 11.3 K/UL (ref 4.6–13.2)

## 2022-07-09 PROCEDURE — 74011000258 HC RX REV CODE- 258: Performed by: HOSPITALIST

## 2022-07-09 PROCEDURE — 36415 COLL VENOUS BLD VENIPUNCTURE: CPT

## 2022-07-09 PROCEDURE — 96376 TX/PRO/DX INJ SAME DRUG ADON: CPT

## 2022-07-09 PROCEDURE — 65270000029 HC RM PRIVATE

## 2022-07-09 PROCEDURE — 77010033678 HC OXYGEN DAILY

## 2022-07-09 PROCEDURE — 74011250637 HC RX REV CODE- 250/637: Performed by: HOSPITALIST

## 2022-07-09 PROCEDURE — G0378 HOSPITAL OBSERVATION PER HR: HCPCS

## 2022-07-09 PROCEDURE — 74011636637 HC RX REV CODE- 636/637: Performed by: HOSPITALIST

## 2022-07-09 PROCEDURE — 85025 COMPLETE CBC W/AUTO DIFF WBC: CPT

## 2022-07-09 PROCEDURE — 74011000250 HC RX REV CODE- 250: Performed by: HOSPITALIST

## 2022-07-09 PROCEDURE — 80048 BASIC METABOLIC PNL TOTAL CA: CPT

## 2022-07-09 PROCEDURE — 94640 AIRWAY INHALATION TREATMENT: CPT

## 2022-07-09 PROCEDURE — 74011250636 HC RX REV CODE- 250/636: Performed by: HOSPITALIST

## 2022-07-09 PROCEDURE — 82962 GLUCOSE BLOOD TEST: CPT

## 2022-07-09 PROCEDURE — 74011250637 HC RX REV CODE- 250/637: Performed by: FAMILY MEDICINE

## 2022-07-09 RX ORDER — INSULIN GLARGINE 100 [IU]/ML
33 INJECTION, SOLUTION SUBCUTANEOUS DAILY
Status: DISCONTINUED | OUTPATIENT
Start: 2022-07-10 | End: 2022-07-12 | Stop reason: HOSPADM

## 2022-07-09 RX ORDER — INSULIN LISPRO 100 [IU]/ML
INJECTION, SOLUTION INTRAVENOUS; SUBCUTANEOUS
Status: DISCONTINUED | OUTPATIENT
Start: 2022-07-09 | End: 2022-07-09

## 2022-07-09 RX ORDER — INSULIN LISPRO 100 [IU]/ML
INJECTION, SOLUTION INTRAVENOUS; SUBCUTANEOUS
Status: DISCONTINUED | OUTPATIENT
Start: 2022-07-09 | End: 2022-07-12 | Stop reason: HOSPADM

## 2022-07-09 RX ORDER — DEXTROSE MONOHYDRATE 100 MG/ML
0-250 INJECTION, SOLUTION INTRAVENOUS AS NEEDED
Status: DISCONTINUED | OUTPATIENT
Start: 2022-07-09 | End: 2022-07-12 | Stop reason: HOSPADM

## 2022-07-09 RX ORDER — IBUPROFEN 200 MG
4 TABLET ORAL AS NEEDED
Status: DISCONTINUED | OUTPATIENT
Start: 2022-07-09 | End: 2022-07-12 | Stop reason: HOSPADM

## 2022-07-09 RX ORDER — POLYETHYLENE GLYCOL 3350 17 G/17G
17 POWDER, FOR SOLUTION ORAL DAILY
Status: DISCONTINUED | OUTPATIENT
Start: 2022-07-09 | End: 2022-07-12 | Stop reason: HOSPADM

## 2022-07-09 RX ORDER — DEXTROSE MONOHYDRATE 100 MG/ML
0-250 INJECTION, SOLUTION INTRAVENOUS AS NEEDED
Status: DISCONTINUED | OUTPATIENT
Start: 2022-07-09 | End: 2022-07-10 | Stop reason: SDUPTHER

## 2022-07-09 RX ORDER — INSULIN LISPRO 100 [IU]/ML
10 INJECTION, SOLUTION INTRAVENOUS; SUBCUTANEOUS
Status: DISCONTINUED | OUTPATIENT
Start: 2022-07-09 | End: 2022-07-12 | Stop reason: HOSPADM

## 2022-07-09 RX ORDER — IBUPROFEN 200 MG
4 TABLET ORAL AS NEEDED
Status: DISCONTINUED | OUTPATIENT
Start: 2022-07-09 | End: 2022-07-10 | Stop reason: SDUPTHER

## 2022-07-09 RX ADMIN — ENOXAPARIN SODIUM 40 MG: 100 INJECTION SUBCUTANEOUS at 20:01

## 2022-07-09 RX ADMIN — GABAPENTIN 300 MG: 300 CAPSULE ORAL at 16:55

## 2022-07-09 RX ADMIN — INSULIN LISPRO 12 UNITS: 100 INJECTION, SOLUTION INTRAVENOUS; SUBCUTANEOUS at 09:21

## 2022-07-09 RX ADMIN — INSULIN LISPRO 15 UNITS: 100 INJECTION, SOLUTION INTRAVENOUS; SUBCUTANEOUS at 12:53

## 2022-07-09 RX ADMIN — FAMOTIDINE 20 MG: 20 TABLET, FILM COATED ORAL at 09:24

## 2022-07-09 RX ADMIN — OXYCODONE HYDROCHLORIDE AND ACETAMINOPHEN 1 TABLET: 5; 325 TABLET ORAL at 22:11

## 2022-07-09 RX ADMIN — INSULIN LISPRO 10 UNITS: 100 INJECTION, SOLUTION INTRAVENOUS; SUBCUTANEOUS at 22:03

## 2022-07-09 RX ADMIN — ARFORMOTEROL TARTRATE 15 MCG: 15 SOLUTION RESPIRATORY (INHALATION) at 20:28

## 2022-07-09 RX ADMIN — Medication 400 MG: at 09:24

## 2022-07-09 RX ADMIN — LEVETIRACETAM 500 MG: 500 TABLET, FILM COATED ORAL at 09:24

## 2022-07-09 RX ADMIN — METHYLPREDNISOLONE SODIUM SUCCINATE 40 MG: 40 INJECTION, POWDER, FOR SOLUTION INTRAMUSCULAR; INTRAVENOUS at 09:24

## 2022-07-09 RX ADMIN — BUDESONIDE 500 MCG: 0.5 INHALANT RESPIRATORY (INHALATION) at 07:46

## 2022-07-09 RX ADMIN — LEVETIRACETAM 500 MG: 500 TABLET, FILM COATED ORAL at 20:01

## 2022-07-09 RX ADMIN — ASPIRIN 81 MG: 81 TABLET, CHEWABLE ORAL at 09:22

## 2022-07-09 RX ADMIN — AMLODIPINE BESYLATE 5 MG: 5 TABLET ORAL at 09:22

## 2022-07-09 RX ADMIN — OXYCODONE HYDROCHLORIDE AND ACETAMINOPHEN 1 TABLET: 5; 325 TABLET ORAL at 11:03

## 2022-07-09 RX ADMIN — POLYETHYLENE GLYCOL 3350 17 G: 17 POWDER, FOR SOLUTION ORAL at 16:55

## 2022-07-09 RX ADMIN — INSULIN LISPRO 8 UNITS: 100 INJECTION, SOLUTION INTRAVENOUS; SUBCUTANEOUS at 18:20

## 2022-07-09 RX ADMIN — GABAPENTIN 300 MG: 300 CAPSULE ORAL at 22:03

## 2022-07-09 RX ADMIN — BUDESONIDE 500 MCG: 0.5 INHALANT RESPIRATORY (INHALATION) at 20:28

## 2022-07-09 RX ADMIN — ATORVASTATIN CALCIUM 40 MG: 20 TABLET, FILM COATED ORAL at 22:03

## 2022-07-09 RX ADMIN — FAMOTIDINE 20 MG: 20 TABLET, FILM COATED ORAL at 20:01

## 2022-07-09 RX ADMIN — ARFORMOTEROL TARTRATE 15 MCG: 15 SOLUTION RESPIRATORY (INHALATION) at 07:46

## 2022-07-09 RX ADMIN — INSULIN LISPRO 10 UNITS: 100 INJECTION, SOLUTION INTRAVENOUS; SUBCUTANEOUS at 18:00

## 2022-07-09 RX ADMIN — AZITHROMYCIN MONOHYDRATE 500 MG: 250 TABLET ORAL at 20:01

## 2022-07-09 RX ADMIN — INSULIN GLARGINE 30 UNITS: 100 INJECTION, SOLUTION SUBCUTANEOUS at 09:21

## 2022-07-09 RX ADMIN — GABAPENTIN 300 MG: 300 CAPSULE ORAL at 09:22

## 2022-07-09 RX ADMIN — CEFTRIAXONE 1 G: 1 INJECTION, POWDER, FOR SOLUTION INTRAMUSCULAR; INTRAVENOUS at 20:01

## 2022-07-09 RX ADMIN — ATENOLOL 100 MG: 50 TABLET ORAL at 09:23

## 2022-07-09 NOTE — PROGRESS NOTES
Hospitalist Progress Note    Patient: Jeane Montgomery MRN: [id number]  CSN: [id number]    YOB: 1945  Age: 77 y.o. Sex: female    DOA: 7/7/2022 LOS:  LOS: 2 days            Assessment/Plan     Principal Problem:    COPD with acute exacerbation ([facility] [address] (7/7/2022)    Active Problems:    HTN (hypertension), benign ()      Hypoxemia (7/7/2022)      Neuropathic pain, leg (7/7/2022)      Seizures (HCC) ()      Diabetes (HCC) ()      Dyspnea: On nasal cannula O2 for now. Not on home O2. Will continue monitor her O2 sats    COPD exacerbation: Taper steroid   [place] treatment as needed. Empiric antibiotics ceftriaxone, azithromycin. CTA chest with No evidence of pulmonary embolism or acute intrathoracic abnormality.     DM/uncontrolled: Hemoglobin A1c was 10. .3, up lantus to 33 units, add Humalog 10 units each meal,   on SSI, ADA diet     HTN : Continue home meds, Monitor BP    Constipation: Add MiraLAX     Seizures: Continue with keppra     Neuropathy : Continue with Neurontin.     DVT prophylaxis with lovenox.     PT/OT     Disposition :  1-2 days    CC: SOB. A 76 y. o. female with diabetes, hypertension, COPD,  history of stroke, seizures who presents to ER with concerns of chest pain, shortness of breath and bilateral leg pain. Subjective:     Pt was seen and examined with the nurse in the morning round. \"I am feeling better. But no bowel movement yet\"    Review of systems  General: No fevers or chills. Cardiovascular: No chest pain or pressure. No palpitations. Pulmonary: + cough/SOB  Gastrointestinal: No nausea, vomiting. Objective:      Visit Vitals  BP (!) 133/105 (BP 1 Location: Right upper arm, BP Patient Position: At rest)   Pulse 65   Temp 98.2 °F (36.8 °C)   Resp 18   Ht 5' 4\" (1.626 m)   Wt 91.2 kg (201 lb)   SpO2 95%   BMI 34.50 kg/m²       Physical Exam:    Gen: NAD, On NC O2  Heent:  MMM, NC, AT. Cor: s1s2 RRR. No MRG.   PMI mid 5th intercostal space.  Resp: Decreased breathing sound bilateral   abd:  NT ND.  BS positive. No rebound or guarding. No masses. Ext: No edema or cyanosis. Intake and Output:  Current Shift:  07/09 0701 - 07/09 1900  In: 290 [P.O.:240; I.V.:50]  Out: 800 [Urine:800]  Last three shifts:  07/07 1901 - 07/09 0700  In: 0   Out: 1700 [Urine:1700]    Labs: Results:       Chemistry Recent Labs     07/09/22 0111 07/08/22  0125 07/07/22  1025   * 291* 191*   * 132* 138   K 4.9 4.5 3.7    101 106   CO2 24 24 25   BUN 21* 12 10   CREA 0.97 1.06 0.82   CA 8.5 8.6 9.2   AGAP 6 7 7   BUCR 22* 11* 12   AP  --   --  85   TP  --   --  7.9   ALB  --   --  3.4   GLOB  --   --  4.5*   AGRAT  --   --  0.8      CBC w/Diff Recent Labs     07/09/22 0111 07/08/22 0125 07/07/22  1025   WBC 11.3 5.6 5.1   RBC 4.45 4.44 4.54   HGB 10.5* 10.7* 10.8*   HCT 34.5* 34.3* 35.2    271 296   GRANS 91* 86* 44   LYMPH 7* 12* 31   EOS 0 0 14*      Cardiac Enzymes No results for input(s): CPK, CKND1, MAGGIE in the last 72 hours. No lab exists for component: CKRMB, TROIP   Coagulation Recent Labs     07/07/22  1025   PTP 14.3   INR 1.1   APTT 28.6       Lipid Panel Lab Results   Component Value Date/Time    Cholesterol, total 127 07/28/2018 06:06 AM    HDL Cholesterol 35 (L) 07/28/2018 06:06 AM    LDL, calculated 61.6 07/28/2018 06:06 AM    VLDL, calculated 30.4 07/28/2018 06:06 AM    Triglyceride 152 (H) 07/28/2018 06:06 AM    CHOL/HDL Ratio 3.6 07/28/2018 06:06 AM      BNP No results for input(s): BNPP in the last 72 hours.    Liver Enzymes Recent Labs     07/07/22  1025   TP 7.9   ALB 3.4   AP 85      Thyroid Studies Lab Results   Component Value Date/Time    TSH 5.60 (H) 02/25/2019 05:50 AM        Procedures/imaging: see electronic medical records for all procedures/Xrays and details which were not copied into this note but were reviewed prior to creation of Plan      Medications Reviewed  Ngoc Gunter MD

## 2022-07-09 NOTE — PROGRESS NOTES
Problem: Pressure Injury - Risk of  Goal: *Prevention of pressure injury  Description: Document Ryne Scale and appropriate interventions in the flowsheet. Outcome: Progressing Towards Goal  Note: Pressure Injury Interventions:  Sensory Interventions: Assess changes in LOC,Keep linens dry and wrinkle-free    Moisture Interventions: Absorbent underpads,Internal/External urinary devices    Activity Interventions: Pressure redistribution bed/mattress(bed type)    Mobility Interventions: Pressure redistribution bed/mattress (bed type)    Nutrition Interventions: Document food/fluid/supplement intake    Friction and Shear Interventions: Minimize layers                Problem: Patient Education: Go to Patient Education Activity  Goal: Patient/Family Education  Outcome: Progressing Towards Goal     Problem: Falls - Risk of  Goal: *Absence of Falls  Description: Document Satinder Fall Risk and appropriate interventions in the flowsheet.   Outcome: Progressing Towards Goal  Note: Fall Risk Interventions:  Mobility Interventions: Assess mobility with egress test         Medication Interventions: Patient to call before getting OOB    Elimination Interventions: Bed/chair exit alarm    History of Falls Interventions: Bed/chair exit alarm         Problem: Patient Education: Go to Patient Education Activity  Goal: Patient/Family Education  Outcome: Progressing Towards Goal

## 2022-07-09 NOTE — PROGRESS NOTES
Assumed patient care. Patient is alert and oriented resting on bed watching TV. O2 at 2 L by NC. Bed at low position, wheels locked and call light within reach. Respiratory therapist in room with patient.

## 2022-07-09 NOTE — PROGRESS NOTES
Hospitalist Progress Note    Patient: Jose Manuel Burk MRN: [id number]  CSN: [id number]    YOB: 1945  Age: 77 y.o. Sex: female    DOA: 7/7/2022 LOS:  LOS: 2 days            Assessment/Plan     Principal Problem:    COPD with acute exacerbation ([address] (7/7/2022)    Active Problems:    HTN (hypertension), benign ()      Hypoxemia (7/7/2022)      Neuropathic pain, leg (7/7/2022)      Seizures (HCC) ()      Diabetes (HCC) ()      Dyspnea: On nasal cannula O2 for now. Not on home O2. Will continue monitor her O2 sats    COPD exacerbation: Taper steroid   [place], [address] treatment as needed. Empiric antibiotics ceftriaxone, azithromycin. CTA chest with No evidence of pulmonary embolism or acute intrathoracic abnormality.     DM/uncontrolled: Hemoglobin A1c was 10. .3, up lantus to 33 units, add Humalog 10 units each meal,   on SSI, ADA diet     HTN : Continue home meds, Monitor BP    Constipation: Add MiraLAX     Seizures: Continue with keppra     Neuropathy : Continue with Neurontin.     DVT prophylaxis with lovenox.     PT/OT     Disposition :  1-2 days    CC: SOB. A 76 y. o. female with diabetes, hypertension, COPD,  history of stroke, seizures who presents to ER with concerns of chest pain, shortness of breath and bilateral leg pain. Subjective:     Pt was seen and examined with the nurse in the morning round. \"I am feeling better. But no bowel movement yet\"    Review of systems  General: No fevers or chills. Cardiovascular: No chest pain or pressure. No palpitations. Pulmonary: + cough/SOB  Gastrointestinal: No nausea, vomiting. Objective:      Visit Vitals  BP (!) 133/105 (BP 1 Location: Right upper arm, BP Patient Position: At rest)   Pulse 65   Temp 98.2 °F (36.8 °C)   Resp 18   Ht 5' 4\" (1.626 m)   Wt 91.2 kg (201 lb)   SpO2 95%   BMI 34.50 kg/m²       Physical Exam:    Gen: NAD, On NC O2  Heent:  MMM, NC, AT. Cor: s1s2 RRR. No MRG.   PMI mid 5th intercostal space.  Resp: Decreased breathing sound bilateral   abd:  NT ND.  BS positive. No rebound or guarding. No masses. Ext: No edema or cyanosis. Intake and Output:  Current Shift:  07/09 0701 - 07/09 1900  In: 290 [P.O.:240; I.V.:50]  Out: 800 [Urine:800]  Last three shifts:  07/07 1901 - 07/09 0700  In: 0   Out: 1700 [Urine:1700]    Labs: Results:       Chemistry Recent Labs     07/09/22 0111 07/08/22  0125 07/07/22  1025   * 291* 191*   * 132* 138   K 4.9 4.5 3.7    101 106   CO2 24 24 25   BUN 21* 12 10   CREA 0.97 1.06 0.82   CA 8.5 8.6 9.2   AGAP 6 7 7   BUCR 22* 11* 12   AP  --   --  85   TP  --   --  7.9   ALB  --   --  3.4   GLOB  --   --  4.5*   AGRAT  --   --  0.8      CBC w/Diff Recent Labs     07/09/22 0111 07/08/22 0125 07/07/22  1025   WBC 11.3 5.6 5.1   RBC 4.45 4.44 4.54   HGB 10.5* 10.7* 10.8*   HCT 34.5* 34.3* 35.2    271 296   GRANS 91* 86* 44   LYMPH 7* 12* 31   EOS 0 0 14*      Cardiac Enzymes No results for input(s): CPK, CKND1, MAGGIE in the last 72 hours. No lab exists for component: CKRMB, TROIP   Coagulation Recent Labs     07/07/22  1025   PTP 14.3   INR 1.1   APTT 28.6       Lipid Panel Lab Results   Component Value Date/Time    Cholesterol, total 127 07/28/2018 06:06 AM    HDL Cholesterol 35 (L) 07/28/2018 06:06 AM    LDL, calculated 61.6 07/28/2018 06:06 AM    VLDL, calculated 30.4 07/28/2018 06:06 AM    Triglyceride 152 (H) 07/28/2018 06:06 AM    CHOL/HDL Ratio 3.6 07/28/2018 06:06 AM      BNP No results for input(s): BNPP in the last 72 hours.    Liver Enzymes Recent Labs     07/07/22  1025   TP 7.9   ALB 3.4   AP 85      Thyroid Studies Lab Results   Component Value Date/Time    TSH 5.60 (H) 02/25/2019 05:50 AM        Procedures/imaging: see electronic medical records for all procedures/Xrays and details which were not copied into this note but were reviewed prior to creation of Plan      Medications Reviewed  Sai Pérez MD

## 2022-07-10 LAB
ANION GAP SERPL CALC-SCNC: 4 MMOL/L (ref 3–18)
BASOPHILS # BLD: 0 K/UL (ref 0–0.1)
BASOPHILS NFR BLD: 0 % (ref 0–2)
BUN SERPL-MCNC: 20 MG/DL (ref 7–18)
BUN/CREAT SERPL: 23 (ref 12–20)
CALCIUM SERPL-MCNC: 8.7 MG/DL (ref 8.5–10.1)
CHLORIDE SERPL-SCNC: 107 MMOL/L (ref 100–111)
CO2 SERPL-SCNC: 27 MMOL/L (ref 21–32)
CREAT SERPL-MCNC: 0.87 MG/DL (ref 0.6–1.3)
DIFFERENTIAL METHOD BLD: ABNORMAL
EOSINOPHIL # BLD: 0 K/UL (ref 0–0.4)
EOSINOPHIL NFR BLD: 0 % (ref 0–5)
ERYTHROCYTE [DISTWIDTH] IN BLOOD BY AUTOMATED COUNT: 17.7 % (ref 11.6–14.5)
EST. AVERAGE GLUCOSE BLD GHB EST-MCNC: 263 MG/DL
GLUCOSE BLD STRIP.AUTO-MCNC: 175 MG/DL (ref 70–110)
GLUCOSE BLD STRIP.AUTO-MCNC: 257 MG/DL (ref 70–110)
GLUCOSE BLD STRIP.AUTO-MCNC: 278 MG/DL (ref 70–110)
GLUCOSE BLD STRIP.AUTO-MCNC: 303 MG/DL (ref 70–110)
GLUCOSE SERPL-MCNC: 178 MG/DL (ref 74–99)
HBA1C MFR BLD: 10.8 % (ref 4.2–5.6)
HCT VFR BLD AUTO: 35.2 % (ref 35–45)
HGB BLD-MCNC: 10.6 G/DL (ref 12–16)
IMM GRANULOCYTES # BLD AUTO: 0.1 K/UL (ref 0–0.04)
IMM GRANULOCYTES NFR BLD AUTO: 1 % (ref 0–0.5)
LYMPHOCYTES # BLD: 1.8 K/UL (ref 0.9–3.6)
LYMPHOCYTES NFR BLD: 20 % (ref 21–52)
MCH RBC QN AUTO: 23.5 PG (ref 24–34)
MCHC RBC AUTO-ENTMCNC: 30.1 G/DL (ref 31–37)
MCV RBC AUTO: 78 FL (ref 78–100)
MONOCYTES # BLD: 0.7 K/UL (ref 0.05–1.2)
MONOCYTES NFR BLD: 7 % (ref 3–10)
NEUTS SEG # BLD: 6.9 K/UL (ref 1.8–8)
NEUTS SEG NFR BLD: 73 % (ref 40–73)
NRBC # BLD: 0 K/UL (ref 0–0.01)
NRBC BLD-RTO: 0 PER 100 WBC
PLATELET # BLD AUTO: 303 K/UL (ref 135–420)
PMV BLD AUTO: 11.3 FL (ref 9.2–11.8)
POTASSIUM SERPL-SCNC: 4.6 MMOL/L (ref 3.5–5.5)
RBC # BLD AUTO: 4.51 M/UL (ref 4.2–5.3)
SODIUM SERPL-SCNC: 138 MMOL/L (ref 136–145)
WBC # BLD AUTO: 9.4 K/UL (ref 4.6–13.2)

## 2022-07-10 PROCEDURE — 74011250636 HC RX REV CODE- 250/636: Performed by: HOSPITALIST

## 2022-07-10 PROCEDURE — 82962 GLUCOSE BLOOD TEST: CPT

## 2022-07-10 PROCEDURE — 94760 N-INVAS EAR/PLS OXIMETRY 1: CPT

## 2022-07-10 PROCEDURE — 97535 SELF CARE MNGMENT TRAINING: CPT

## 2022-07-10 PROCEDURE — G0378 HOSPITAL OBSERVATION PER HR: HCPCS

## 2022-07-10 PROCEDURE — 85025 COMPLETE CBC W/AUTO DIFF WBC: CPT

## 2022-07-10 PROCEDURE — 80048 BASIC METABOLIC PNL TOTAL CA: CPT

## 2022-07-10 PROCEDURE — 74011000258 HC RX REV CODE- 258: Performed by: HOSPITALIST

## 2022-07-10 PROCEDURE — 36415 COLL VENOUS BLD VENIPUNCTURE: CPT

## 2022-07-10 PROCEDURE — 83036 HEMOGLOBIN GLYCOSYLATED A1C: CPT

## 2022-07-10 PROCEDURE — 94640 AIRWAY INHALATION TREATMENT: CPT

## 2022-07-10 PROCEDURE — 65270000029 HC RM PRIVATE

## 2022-07-10 PROCEDURE — 74011000250 HC RX REV CODE- 250: Performed by: HOSPITALIST

## 2022-07-10 PROCEDURE — 96376 TX/PRO/DX INJ SAME DRUG ADON: CPT

## 2022-07-10 PROCEDURE — 77010033678 HC OXYGEN DAILY

## 2022-07-10 PROCEDURE — 74011636637 HC RX REV CODE- 636/637: Performed by: HOSPITALIST

## 2022-07-10 PROCEDURE — 97530 THERAPEUTIC ACTIVITIES: CPT

## 2022-07-10 PROCEDURE — 74011250637 HC RX REV CODE- 250/637: Performed by: HOSPITALIST

## 2022-07-10 PROCEDURE — 74011636637 HC RX REV CODE- 636/637: Performed by: FAMILY MEDICINE

## 2022-07-10 PROCEDURE — 74011250637 HC RX REV CODE- 250/637: Performed by: FAMILY MEDICINE

## 2022-07-10 RX ORDER — LOSARTAN POTASSIUM 50 MG/1
50 TABLET ORAL DAILY
Status: DISCONTINUED | OUTPATIENT
Start: 2022-07-10 | End: 2022-07-12 | Stop reason: HOSPADM

## 2022-07-10 RX ADMIN — INSULIN LISPRO 6 UNITS: 100 INJECTION, SOLUTION INTRAVENOUS; SUBCUTANEOUS at 16:14

## 2022-07-10 RX ADMIN — INSULIN GLARGINE 33 UNITS: 100 INJECTION, SOLUTION SUBCUTANEOUS at 08:33

## 2022-07-10 RX ADMIN — OXYCODONE HYDROCHLORIDE AND ACETAMINOPHEN 1 TABLET: 5; 325 TABLET ORAL at 19:23

## 2022-07-10 RX ADMIN — AZITHROMYCIN MONOHYDRATE 500 MG: 250 TABLET ORAL at 20:14

## 2022-07-10 RX ADMIN — ARFORMOTEROL TARTRATE 15 MCG: 15 SOLUTION RESPIRATORY (INHALATION) at 19:35

## 2022-07-10 RX ADMIN — ATORVASTATIN CALCIUM 40 MG: 20 TABLET, FILM COATED ORAL at 22:07

## 2022-07-10 RX ADMIN — LOSARTAN POTASSIUM 50 MG: 50 TABLET, FILM COATED ORAL at 08:37

## 2022-07-10 RX ADMIN — INSULIN LISPRO 6 UNITS: 100 INJECTION, SOLUTION INTRAVENOUS; SUBCUTANEOUS at 12:18

## 2022-07-10 RX ADMIN — INSULIN LISPRO 8 UNITS: 100 INJECTION, SOLUTION INTRAVENOUS; SUBCUTANEOUS at 22:07

## 2022-07-10 RX ADMIN — FAMOTIDINE 20 MG: 20 TABLET, FILM COATED ORAL at 08:37

## 2022-07-10 RX ADMIN — CEFTRIAXONE 1 G: 1 INJECTION, POWDER, FOR SOLUTION INTRAMUSCULAR; INTRAVENOUS at 20:13

## 2022-07-10 RX ADMIN — BUDESONIDE 500 MCG: 0.5 INHALANT RESPIRATORY (INHALATION) at 19:35

## 2022-07-10 RX ADMIN — OXYCODONE HYDROCHLORIDE AND ACETAMINOPHEN 1 TABLET: 5; 325 TABLET ORAL at 12:11

## 2022-07-10 RX ADMIN — ASPIRIN 81 MG: 81 TABLET, CHEWABLE ORAL at 08:36

## 2022-07-10 RX ADMIN — ENOXAPARIN SODIUM 40 MG: 100 INJECTION SUBCUTANEOUS at 20:13

## 2022-07-10 RX ADMIN — Medication 400 MG: at 08:37

## 2022-07-10 RX ADMIN — INSULIN LISPRO 10 UNITS: 100 INJECTION, SOLUTION INTRAVENOUS; SUBCUTANEOUS at 08:34

## 2022-07-10 RX ADMIN — FAMOTIDINE 20 MG: 20 TABLET, FILM COATED ORAL at 20:14

## 2022-07-10 RX ADMIN — AMLODIPINE BESYLATE 5 MG: 5 TABLET ORAL at 08:37

## 2022-07-10 RX ADMIN — BUDESONIDE 500 MCG: 0.5 INHALANT RESPIRATORY (INHALATION) at 07:57

## 2022-07-10 RX ADMIN — GABAPENTIN 300 MG: 300 CAPSULE ORAL at 22:07

## 2022-07-10 RX ADMIN — INSULIN LISPRO 10 UNITS: 100 INJECTION, SOLUTION INTRAVENOUS; SUBCUTANEOUS at 12:15

## 2022-07-10 RX ADMIN — ARFORMOTEROL TARTRATE 15 MCG: 15 SOLUTION RESPIRATORY (INHALATION) at 07:57

## 2022-07-10 RX ADMIN — LEVETIRACETAM 500 MG: 500 TABLET, FILM COATED ORAL at 20:14

## 2022-07-10 RX ADMIN — GABAPENTIN 300 MG: 300 CAPSULE ORAL at 16:15

## 2022-07-10 RX ADMIN — INSULIN LISPRO 2 UNITS: 100 INJECTION, SOLUTION INTRAVENOUS; SUBCUTANEOUS at 08:35

## 2022-07-10 RX ADMIN — GABAPENTIN 300 MG: 300 CAPSULE ORAL at 08:36

## 2022-07-10 RX ADMIN — INSULIN LISPRO 10 UNITS: 100 INJECTION, SOLUTION INTRAVENOUS; SUBCUTANEOUS at 16:14

## 2022-07-10 RX ADMIN — PREDNISONE 50 MG: 20 TABLET ORAL at 08:36

## 2022-07-10 RX ADMIN — ATENOLOL 100 MG: 50 TABLET ORAL at 08:36

## 2022-07-10 RX ADMIN — LEVETIRACETAM 500 MG: 500 TABLET, FILM COATED ORAL at 08:36

## 2022-07-10 NOTE — PROGRESS NOTES
OCCUPATIONAL THERAPY TREATMENT    Problem: Self Care Deficits Care Plan (Adult)  Goal: *Acute Goals and Plan of Care (Insert Text)  Description: Occupational Therapy Goals  Initiated 7/8/2022 within 7 day(s). 1.  Patient will perform grooming with supervision/set-up standing at sink. 2.  Patient will perform lower body dressing with supervision/set-up. 3.  Patient will perform toilet transfers with supervision/set-up. 4.  Patient will perform all aspects of toileting with supervision/set-up. 5.  Patient will participate in upper extremity therapeutic exercise/activities with supervision/set-up for 10 minutes. 6.  Patient will utilize energy conservation techniques during functional activities with verbal, visual, and tactile cues. Outcome: Progressing Towards Goal     Patient: Geovanna Alvarez (69 y.o. female)  Date: 7/10/2022  Diagnosis: COPD with acute exacerbation (HCC) [J44.1]  Hypoxemia [R09.02]  Neuropathic pain, leg [M79.2] COPD with acute exacerbation [facility])       Precautions: Fall      Chart, occupational therapy assessment, plan of care, and goals were reviewed. ASSESSMENT:  Pt presents sitting EOB,  present, agreeable to therapy. Currently on 3L via NC. Pt sit to stand with SBA, ambulates to bathroom, is able to brush teeth, wash face, comb hair in standing with no LOB or SOB. Following begins to have fatigue and requests sitting. Education on pursed lip breathing. Pt able to perform seated leg extensions but begins to have cramps in hamstrings but slowly resolve, pt reports this is not new. Have heavy eduction with pt has  on how to set up house with rest breaks in between rooms, seats where she knows that she can sit if she begins to have cramps or get winded in order to decrease the chance of a fall or hypoxic episode.  Pt able to perform standing push/pull activity on each arms, becomes winded following, O2 down to 93%, able to recovery after 3 minutes of deep pursed lip breathing. Pt left sitting EOB with  present. Progression toward goals:  []          Improving appropriately and progressing toward goals  [x]          Improving slowly and progressing toward goals  []          Not making progress toward goals and plan of care will be adjusted     PLAN:  Patient continues to benefit from skilled intervention to address the above impairments. Continue treatment per established plan of care. Discharge Recommendations:  Outpatient Pulmonary Rehab  Further Equipment Recommendations for Discharge:  Shower chair, grab bars      SUBJECTIVE:   Patient stated I am winded.     OBJECTIVE DATA SUMMARY:   Cognitive/Behavioral Status:  Neurologic State: Alert,Appropriate for age  Orientation Level: Oriented X4  Cognition: Appropriate decision making  Safety/Judgement: Fall prevention    Functional Mobility and Transfers for ADLs:      Transfers:  Sit to Stand: Stand-by assistance     Bathroom Mobility: Contact guard assistance        Balance:  Sitting: Intact  Standing: Intact; With support    ADL Intervention:       Grooming  Grooming Assistance: Contact guard assistance  Position Performed: Standing  Washing Face: Contact guard assistance  Washing Hands: Contact guard assistance  Brushing Teeth: Contact guard assistance  Brushing/Combing Hair: Contact guard assistance    Cognitive Retraining  Executive Functions: Regulating behavior;Managing time  Safety/Judgement: Fall prevention    Pain:  Pain level pre-treatment: 0/10   Pain level post-treatment: 0/10  Pain Intervention(s): Medication administered by RN (see MAR); Rest, Ice, Repositioning   Response to intervention: Nurse notified, See doc flow sheet    Activity Tolerance:    Improving but still gets winded following activities, needs education on rest breaks and how to set up hosue to allow for rest breaks during functional ambulation. Please refer to the flowsheet for vital signs taken during this treatment.   After treatment:   []  Patient left in no apparent distress sitting up in chair  [x]  Patient left in no apparent distress in bed  [x]  Call bell left within reach  [x]  Nursing notified  []  Caregiver present  []  Bed alarm activated    COMMUNICATION/EDUCATION:   [x] Role of Occupational Therapy in the acute care setting  [x] Home safety education was provided and the patient/caregiver indicated understanding. [x] Patient/family have participated as able in working towards goals and plan of care. [x] Patient/family agree to work toward stated goals and plan of care. [] Patient understands intent and goals of therapy, but is neutral about his/her participation. [] Patient is unable to participate in goal setting and plan of care.       Thank you for this referral.  Sapna Modi OTD, OTR/l   Time Calculation: 27 mins

## 2022-07-10 NOTE — PROGRESS NOTES
Bedside shift change report given to Rhea RN (oncoming nurse) by Beka Rangel RN   (offgoing nurse). Report included the following information SBAR, Kardex, Intake/Output, MAR and Recent Results.

## 2022-07-10 NOTE — PROGRESS NOTES
Problem: Pressure Injury - Risk of  Goal: *Prevention of pressure injury  Description: Document Ryne Scale and appropriate interventions in the flowsheet. Outcome: Progressing Towards Goal  Note: Pressure Injury Interventions:  Sensory Interventions: Assess changes in LOC    Moisture Interventions: Absorbent underpads    Activity Interventions: Increase time out of bed    Mobility Interventions: Pressure redistribution bed/mattress (bed type)    Nutrition Interventions: Document food/fluid/supplement intake    Friction and Shear Interventions: HOB 30 degrees or less,Minimize layers                Problem: Patient Education: Go to Patient Education Activity  Goal: Patient/Family Education  Outcome: Progressing Towards Goal     Problem: Falls - Risk of  Goal: *Absence of Falls  Description: Document Satinder Fall Risk and appropriate interventions in the flowsheet.   Outcome: Progressing Towards Goal  Note: Fall Risk Interventions:  Mobility Interventions: Patient to call before getting OOB         Medication Interventions: Patient to call before getting OOB    Elimination Interventions: Call light in reach    History of Falls Interventions: Bed/chair exit alarm         Problem: Patient Education: Go to Patient Education Activity  Goal: Patient/Family Education  Outcome: Progressing Towards Goal

## 2022-07-10 NOTE — PROGRESS NOTES
Bedside shift change report given to Rhea TIERNEY (oncoming nurse) by Petr Allred RN   (offgoing nurse). Report included the following information SBAR, Kardex, Intake/Output, MAR and Recent Results.

## 2022-07-10 NOTE — PROGRESS NOTES
Verbal shift change report given to Amandeep Salinas RN (oncoming nurse) by Carlota Sandoval RN (offgoing nurse). Report included the following information SBAR, Kardex, Intake/Output, MAR and Recent Results.

## 2022-07-10 NOTE — PROGRESS NOTES
Assumed patient care. Patient is alert and oriented. O2 at 2 L by NC. Bed at low position, wheels locked and call light within reach.

## 2022-07-10 NOTE — PROGRESS NOTES
OCCUPATIONAL THERAPY TREATMENT    Problem: Self Care Deficits Care Plan (Adult)  Goal: *Acute Goals and Plan of Care (Insert Text)  Description: Occupational Therapy Goals  Initiated 7/8/2022 within 7 day(s). 1.  Patient will perform grooming with supervision/set-up standing at sink. 2.  Patient will perform lower body dressing with supervision/set-up. 3.  Patient will perform toilet transfers with supervision/set-up. 4.  Patient will perform all aspects of toileting with supervision/set-up. 5.  Patient will participate in upper extremity therapeutic exercise/activities with supervision/set-up for 10 minutes. 6.  Patient will utilize energy conservation techniques during functional activities with verbal, visual, and tactile cues. Outcome: Progressing Towards Goal     Patient: Elena Vallejo (12 y.o. female)  Date: 7/10/2022  Diagnosis: COPD with acute exacerbation (HCC) [J44.1]  Hypoxemia [R09.02]  Neuropathic pain, leg [M79.2] COPD with acute exacerbation [facility])       Precautions: Fall      Chart, occupational therapy assessment, plan of care, and goals were reviewed. ASSESSMENT:  Pt presents sitting EOB,  present, agreeable to therapy. Currently on 3L via NC. Pt sit to stand with SBA, ambulates to bathroom, is able to brush teeth, wash face, comb hair in standing with no LOB or SOB. Following begins to have fatigue and requests sitting. Education on pursed lip breathing. Pt able to perform seated leg extensions but begins to have cramps in hamstrings but slowly resolve, pt reports this is not new. Have heavy eduction with pt has  on how to set up house with rest breaks in between rooms, seats where she knows that she can sit if she begins to have cramps or get winded in order to decrease the chance of a fall or hypoxic episode.  Pt able to perform standing push/pull activity on each arms, becomes winded following, O2 down to 93%, able to recovery after 3 minutes of deep pursed lip breathing. Pt left sitting EOB with  present. Progression toward goals:  []          Improving appropriately and progressing toward goals  [x]          Improving slowly and progressing toward goals  []          Not making progress toward goals and plan of care will be adjusted     PLAN:  Patient continues to benefit from skilled intervention to address the above impairments. Continue treatment per established plan of care. Discharge Recommendations:  Outpatient Pulmonary Rehab  Further Equipment Recommendations for Discharge:  Shower chair, grab bars      SUBJECTIVE:   Patient stated I am winded.     OBJECTIVE DATA SUMMARY:   Cognitive/Behavioral Status:  Neurologic State: Alert,Appropriate for age  Orientation Level: Oriented X4  Cognition: Appropriate decision making  Safety/Judgement: Fall prevention    Functional Mobility and Transfers for ADLs:      Transfers:  Sit to Stand: Stand-by assistance     Bathroom Mobility: Contact guard assistance        Balance:  Sitting: Intact  Standing: Intact; With support    ADL Intervention:       Grooming  Grooming Assistance: Contact guard assistance  Position Performed: Standing  Washing Face: Contact guard assistance  Washing Hands: Contact guard assistance  Brushing Teeth: Contact guard assistance  Brushing/Combing Hair: Contact guard assistance    Cognitive Retraining  Executive Functions: Regulating behavior;Managing time  Safety/Judgement: Fall prevention    Pain:  Pain level pre-treatment: 0/10   Pain level post-treatment: 0/10  Pain Intervention(s): Medication administered by RN (see MAR); Rest, Ice, Repositioning   Response to intervention: Nurse notified, See doc flow sheet    Activity Tolerance:    Improving but still gets winded following activities, needs education on rest breaks and how to set up hosue to allow for rest breaks during functional ambulation. Please refer to the flowsheet for vital signs taken during this treatment.   After treatment:   []  Patient left in no apparent distress sitting up in chair  [x]  Patient left in no apparent distress in bed  [x]  Call bell left within reach  [x]  Nursing notified  []  Caregiver present  []  Bed alarm activated    COMMUNICATION/EDUCATION:   [x] Role of Occupational Therapy in the acute care setting  [x] Home safety education was provided and the patient/caregiver indicated understanding. [x] Patient/family have participated as able in working towards goals and plan of care. [x] Patient/family agree to work toward stated goals and plan of care. [] Patient understands intent and goals of therapy, but is neutral about his/her participation. [] Patient is unable to participate in goal setting and plan of care.       Thank you for this referral.  Dave TRINIDAD, OTR/l   Time Calculation: 27 mins

## 2022-07-10 NOTE — PROGRESS NOTES
Hospitalist Progress Note    Patient: aFng Kat MRN: [id number]  CSN: [id number]    YOB: 1945  Age: 77 y.o. Sex: female    DOA: 7/7/2022 LOS:  LOS: 3 days            Assessment/Plan     Principal Problem:    COPD with acute exacerbation ([address] (7/7/2022)    Active Problems:    HTN (hypertension), benign ()      Hypoxemia (7/7/2022)      Neuropathic pain, leg (7/7/2022)      Seizures (HCC) ()      Diabetes (HCC) ()    Dyspnea: On nasal cannula O2 for now. Not on home O2. Will continue monitor her O2 sats     COPD exacerbation: Taper steroid   [place] treatment as needed. Empiric antibiotics ceftriaxone, azithromycin. CTA chest with No evidence of pulmonary embolism or acute intrathoracic abnormality.     DM/uncontrolled: Hemoglobin A1c was 10. .3, On lantus to 33 units, Humalog 10 units each meal,   on SSI, ADA diet     HTN : Continue home meds, Monitor BP     Constipation: Add MiraLAX     Seizures: Continue with keppra     Neuropathy : Continue with Neurontin.     DVT prophylaxis with lovenox.     PT/OT     Disposition :  1-2 days     CC: SOB. A 76 y. o. female with diabetes, hypertension, COPD,  history of stroke, seizures who presents to ER with concerns of chest pain, shortness of breath and bilateral leg pain. Subjective:     Pt was seen and examined with the nurse in the morning round. \" I was confused last night. Did not know where I am.  But I am good now. \"  + cough, less SOB. Her  was at bedside    Review of systems  General: No fevers or chills. Cardiovascular: No chest pain or pressure. No palpitations. Pulmonary: + cough/ SOB  Gastrointestinal: No nausea, vomiting.      Objective:      Visit Vitals  BP (!) 119/57 (BP 1 Location: Right upper arm, BP Patient Position: Sitting)   Pulse 61   Temp 98.5 °F (36.9 °C)   Resp 18   Ht 5' 4\" (1.626 m)   Wt 91.2 kg (201 lb)   SpO2 99%   BMI 34.50 kg/m²       Physical Exam:    Gen: NAD, NC O2 2L/min. Heent:  MMM, NC, AT. Cor: s1s2 RRR. No MRG. PMI mid 5th intercostal space. Resp:  Decreased BS b/l  Abd:  NT ND.  BS positive. No rebound or guarding. No masses. Ext: No edema or cyanosis. Intake and Output:  Current Shift:  No intake/output data recorded. Last three shifts:  07/08 1901 - 07/10 0700  In: 1090 [P.O.:1040; I.V.:50]  Out: 800 [Urine:800]    Labs: Results:       Chemistry Recent Labs     07/10/22  0446 07/09/22  0111 07/08/22  0125   * 318* 291*    134* 132*   K 4.6 4.9 4.5    104 101   CO2 27 24 24   BUN 20* 21* 12   CREA 0.87 0.97 1.06   CA 8.7 8.5 8.6   AGAP 4 6 7   BUCR 23* 22* 11*      CBC w/Diff Recent Labs     07/10/22  0446 07/09/22  0111 07/08/22  0125   WBC 9.4 11.3 5.6   RBC 4.51 4.45 4.44   HGB 10.6* 10.5* 10.7*   HCT 35.2 34.5* 34.3*    303 271   GRANS 73 91* 86*   LYMPH 20* 7* 12*   EOS 0 0 0      Cardiac Enzymes No results for input(s): CPK, CKND1, MAGGIE in the last 72 hours. No lab exists for component: CKRMB, TROIP   Coagulation No results for input(s): PTP, INR, APTT, INREXT, INREXT in the last 72 hours. Lipid Panel Lab Results   Component Value Date/Time    Cholesterol, total 127 07/28/2018 06:06 AM    HDL Cholesterol 35 (L) 07/28/2018 06:06 AM    LDL, calculated 61.6 07/28/2018 06:06 AM    VLDL, calculated 30.4 07/28/2018 06:06 AM    Triglyceride 152 (H) 07/28/2018 06:06 AM    CHOL/HDL Ratio 3.6 07/28/2018 06:06 AM      BNP No results for input(s): BNPP in the last 72 hours. Liver Enzymes No results for input(s): TP, ALB, TBIL, AP in the last 72 hours.     No lab exists for component: SGOT, GPT, DBIL   Thyroid Studies Lab Results   Component Value Date/Time    TSH 5.60 (H) 02/25/2019 05:50 AM        Procedures/imaging: see electronic medical records for all procedures/Xrays and details which were not copied into this note but were reviewed prior to creation of Plan      Medications Reviewed  Johana Fitzgerald MD

## 2022-07-10 NOTE — PROGRESS NOTES
Verbal shift change report given to Risa Gonsalves RN (oncoming nurse) by Vito Garnett RN (offgoing nurse). Report included the following information SBAR, Kardex, Intake/Output, MAR and Recent Results.

## 2022-07-10 NOTE — PROGRESS NOTES
Hospitalist Progress Note    Patient: Yokasta Meadows MRN: [id number]  CSN: [id number]    YOB: 1945  Age: 77 y.o. Sex: female    DOA: 7/7/2022 LOS:  LOS: 3 days            Assessment/Plan     Principal Problem:    COPD with acute exacerbation ([facility] [address] (7/7/2022)    Active Problems:    HTN (hypertension), benign ()      Hypoxemia (7/7/2022)      Neuropathic pain, leg (7/7/2022)      Seizures (HCC) ()      Diabetes (HCC) ()    Dyspnea: On nasal cannula O2 for now. Not on home O2. Will continue monitor her O2 sats     COPD exacerbation: Taper steroid   [address] treatment as needed. Empiric antibiotics ceftriaxone, azithromycin. CTA chest with No evidence of pulmonary embolism or acute intrathoracic abnormality.     DM/uncontrolled: Hemoglobin A1c was 10. .3, On lantus to 33 units, Humalog 10 units each meal,   on SSI, ADA diet     HTN : Continue home meds, Monitor BP     Constipation: Add MiraLAX     Seizures: Continue with keppra     Neuropathy : Continue with Neurontin.     DVT prophylaxis with lovenox.     PT/OT     Disposition :  1-2 days     CC: SOB. A 76 y. o. female with diabetes, hypertension, COPD,  history of stroke, seizures who presents to ER with concerns of chest pain, shortness of breath and bilateral leg pain. Subjective:     Pt was seen and examined with the nurse in the morning round. \" I was confused last night. Did not know where I am.  But I am good now. \"  + cough, less SOB. Her  was at bedside    Review of systems  General: No fevers or chills. Cardiovascular: No chest pain or pressure. No palpitations. Pulmonary: + cough/ SOB  Gastrointestinal: No nausea, vomiting.      Objective:      Visit Vitals  BP (!) 119/57 (BP 1 Location: Right upper arm, BP Patient Position: Sitting)   Pulse 61   Temp 98.5 °F (36.9 °C)   Resp 18   Ht 5' 4\" (1.626 m)   Wt 91.2 kg (201 lb)   SpO2 99%   BMI 34.50 kg/m²       Physical Exam:    Gen: NAD, NC O2 2L/min. Heent:  MMM, NC, AT. Cor: s1s2 RRR. No MRG. PMI mid 5th intercostal space. Resp:  Decreased BS b/l  Abd:  NT ND.  BS positive. No rebound or guarding. No masses. Ext: No edema or cyanosis. Intake and Output:  Current Shift:  No intake/output data recorded. Last three shifts:  07/08 1901 - 07/10 0700  In: 1090 [P.O.:1040; I.V.:50]  Out: 800 [Urine:800]    Labs: Results:       Chemistry Recent Labs     07/10/22  0446 07/09/22  0111 07/08/22  0125   * 318* 291*    134* 132*   K 4.6 4.9 4.5    104 101   CO2 27 24 24   BUN 20* 21* 12   CREA 0.87 0.97 1.06   CA 8.7 8.5 8.6   AGAP 4 6 7   BUCR 23* 22* 11*      CBC w/Diff Recent Labs     07/10/22  0446 07/09/22  0111 07/08/22  0125   WBC 9.4 11.3 5.6   RBC 4.51 4.45 4.44   HGB 10.6* 10.5* 10.7*   HCT 35.2 34.5* 34.3*    303 271   GRANS 73 91* 86*   LYMPH 20* 7* 12*   EOS 0 0 0      Cardiac Enzymes No results for input(s): CPK, CKND1, MAGGIE in the last 72 hours. No lab exists for component: CKRMB, TROIP   Coagulation No results for input(s): PTP, INR, APTT, INREXT, INREXT in the last 72 hours. Lipid Panel Lab Results   Component Value Date/Time    Cholesterol, total 127 07/28/2018 06:06 AM    HDL Cholesterol 35 (L) 07/28/2018 06:06 AM    LDL, calculated 61.6 07/28/2018 06:06 AM    VLDL, calculated 30.4 07/28/2018 06:06 AM    Triglyceride 152 (H) 07/28/2018 06:06 AM    CHOL/HDL Ratio 3.6 07/28/2018 06:06 AM      BNP No results for input(s): BNPP in the last 72 hours. Liver Enzymes No results for input(s): TP, ALB, TBIL, AP in the last 72 hours.     No lab exists for component: SGOT, GPT, DBIL   Thyroid Studies Lab Results   Component Value Date/Time    TSH 5.60 (H) 02/25/2019 05:50 AM        Procedures/imaging: see electronic medical records for all procedures/Xrays and details which were not copied into this note but were reviewed prior to creation of Plan      Medications Reviewed  Kadeem Tamayo MD

## 2022-07-10 NOTE — PROGRESS NOTES
Bedside shift change report given to Rhea TIERNEY (oncoming nurse) by John Mack RN   (offgoing nurse). Report included the following information SBAR, Kardex, Intake/Output, MAR and Recent Results.

## 2022-07-11 LAB
GLUCOSE BLD STRIP.AUTO-MCNC: 161 MG/DL (ref 70–110)
GLUCOSE BLD STRIP.AUTO-MCNC: 204 MG/DL (ref 70–110)
GLUCOSE BLD STRIP.AUTO-MCNC: 262 MG/DL (ref 70–110)
GLUCOSE BLD STRIP.AUTO-MCNC: 310 MG/DL (ref 70–110)

## 2022-07-11 PROCEDURE — G0378 HOSPITAL OBSERVATION PER HR: HCPCS

## 2022-07-11 PROCEDURE — 65270000029 HC RM PRIVATE

## 2022-07-11 PROCEDURE — 74011000250 HC RX REV CODE- 250: Performed by: HOSPITALIST

## 2022-07-11 PROCEDURE — 94640 AIRWAY INHALATION TREATMENT: CPT

## 2022-07-11 PROCEDURE — 74011250636 HC RX REV CODE- 250/636: Performed by: HOSPITALIST

## 2022-07-11 PROCEDURE — 96376 TX/PRO/DX INJ SAME DRUG ADON: CPT

## 2022-07-11 PROCEDURE — 74011250637 HC RX REV CODE- 250/637: Performed by: HOSPITALIST

## 2022-07-11 PROCEDURE — 97530 THERAPEUTIC ACTIVITIES: CPT

## 2022-07-11 PROCEDURE — 74011250637 HC RX REV CODE- 250/637: Performed by: FAMILY MEDICINE

## 2022-07-11 PROCEDURE — 97116 GAIT TRAINING THERAPY: CPT

## 2022-07-11 PROCEDURE — 74011636637 HC RX REV CODE- 636/637: Performed by: HOSPITALIST

## 2022-07-11 PROCEDURE — 77010033678 HC OXYGEN DAILY

## 2022-07-11 PROCEDURE — 82962 GLUCOSE BLOOD TEST: CPT

## 2022-07-11 PROCEDURE — 74011636637 HC RX REV CODE- 636/637: Performed by: FAMILY MEDICINE

## 2022-07-11 PROCEDURE — 74011000258 HC RX REV CODE- 258: Performed by: HOSPITALIST

## 2022-07-11 RX ADMIN — GABAPENTIN 300 MG: 300 CAPSULE ORAL at 16:42

## 2022-07-11 RX ADMIN — PREDNISONE 50 MG: 20 TABLET ORAL at 08:13

## 2022-07-11 RX ADMIN — OXYCODONE HYDROCHLORIDE AND ACETAMINOPHEN 1 TABLET: 5; 325 TABLET ORAL at 20:35

## 2022-07-11 RX ADMIN — Medication 400 MG: at 08:14

## 2022-07-11 RX ADMIN — FAMOTIDINE 20 MG: 20 TABLET, FILM COATED ORAL at 08:14

## 2022-07-11 RX ADMIN — BUDESONIDE 500 MCG: 0.5 INHALANT RESPIRATORY (INHALATION) at 07:30

## 2022-07-11 RX ADMIN — ATORVASTATIN CALCIUM 40 MG: 20 TABLET, FILM COATED ORAL at 21:22

## 2022-07-11 RX ADMIN — LOSARTAN POTASSIUM 50 MG: 50 TABLET, FILM COATED ORAL at 08:13

## 2022-07-11 RX ADMIN — INSULIN LISPRO 6 UNITS: 100 INJECTION, SOLUTION INTRAVENOUS; SUBCUTANEOUS at 21:25

## 2022-07-11 RX ADMIN — ASPIRIN 81 MG: 81 TABLET, CHEWABLE ORAL at 08:14

## 2022-07-11 RX ADMIN — LEVETIRACETAM 500 MG: 500 TABLET, FILM COATED ORAL at 08:14

## 2022-07-11 RX ADMIN — INSULIN LISPRO 10 UNITS: 100 INJECTION, SOLUTION INTRAVENOUS; SUBCUTANEOUS at 11:57

## 2022-07-11 RX ADMIN — GABAPENTIN 300 MG: 300 CAPSULE ORAL at 08:14

## 2022-07-11 RX ADMIN — INSULIN LISPRO 10 UNITS: 100 INJECTION, SOLUTION INTRAVENOUS; SUBCUTANEOUS at 08:12

## 2022-07-11 RX ADMIN — AZITHROMYCIN MONOHYDRATE 500 MG: 250 TABLET ORAL at 20:35

## 2022-07-11 RX ADMIN — BUDESONIDE 500 MCG: 0.5 INHALANT RESPIRATORY (INHALATION) at 19:45

## 2022-07-11 RX ADMIN — INSULIN LISPRO 4 UNITS: 100 INJECTION, SOLUTION INTRAVENOUS; SUBCUTANEOUS at 11:57

## 2022-07-11 RX ADMIN — CEFTRIAXONE 1 G: 1 INJECTION, POWDER, FOR SOLUTION INTRAMUSCULAR; INTRAVENOUS at 20:35

## 2022-07-11 RX ADMIN — LEVETIRACETAM 500 MG: 500 TABLET, FILM COATED ORAL at 20:36

## 2022-07-11 RX ADMIN — ARFORMOTEROL TARTRATE 15 MCG: 15 SOLUTION RESPIRATORY (INHALATION) at 07:30

## 2022-07-11 RX ADMIN — OXYCODONE HYDROCHLORIDE AND ACETAMINOPHEN 1 TABLET: 5; 325 TABLET ORAL at 08:23

## 2022-07-11 RX ADMIN — ENOXAPARIN SODIUM 40 MG: 100 INJECTION SUBCUTANEOUS at 20:35

## 2022-07-11 RX ADMIN — INSULIN GLARGINE 33 UNITS: 100 INJECTION, SOLUTION SUBCUTANEOUS at 08:12

## 2022-07-11 RX ADMIN — ARFORMOTEROL TARTRATE 15 MCG: 15 SOLUTION RESPIRATORY (INHALATION) at 19:45

## 2022-07-11 RX ADMIN — FAMOTIDINE 20 MG: 20 TABLET, FILM COATED ORAL at 20:36

## 2022-07-11 RX ADMIN — INSULIN LISPRO 10 UNITS: 100 INJECTION, SOLUTION INTRAVENOUS; SUBCUTANEOUS at 16:42

## 2022-07-11 RX ADMIN — GABAPENTIN 300 MG: 300 CAPSULE ORAL at 21:22

## 2022-07-11 RX ADMIN — INSULIN LISPRO 8 UNITS: 100 INJECTION, SOLUTION INTRAVENOUS; SUBCUTANEOUS at 16:43

## 2022-07-11 RX ADMIN — INSULIN LISPRO 2 UNITS: 100 INJECTION, SOLUTION INTRAVENOUS; SUBCUTANEOUS at 08:14

## 2022-07-11 RX ADMIN — AMLODIPINE BESYLATE 5 MG: 5 TABLET ORAL at 08:14

## 2022-07-11 NOTE — PROGRESS NOTES
Problem: Pressure Injury - Risk of  Goal: *Prevention of pressure injury  Description: Document Ryne Scale and appropriate interventions in the flowsheet. Outcome: Progressing Towards Goal  Note: Pressure Injury Interventions:  Sensory Interventions: Assess changes in LOC    Moisture Interventions: Absorbent underpads    Activity Interventions: Pressure redistribution bed/mattress(bed type)    Mobility Interventions: Pressure redistribution bed/mattress (bed type)    Nutrition Interventions: Document food/fluid/supplement intake    Friction and Shear Interventions: Minimize layers                Problem: Patient Education: Go to Patient Education Activity  Goal: Patient/Family Education  Outcome: Progressing Towards Goal     Problem: Falls - Risk of  Goal: *Absence of Falls  Description: Document Satinder Fall Risk and appropriate interventions in the flowsheet. Outcome: Progressing Towards Goal  Note: Fall Risk Interventions:  Mobility Interventions: Patient to call before getting OOB         Medication Interventions: Patient to call before getting OOB    Elimination Interventions: Call light in reach    History of Falls Interventions: Consult care management for discharge planning         Problem: Patient Education: Go to Patient Education Activity  Goal: Patient/Family Education  Outcome: Progressing Towards Goal     Problem: Diabetes Self-Management  Goal: *Disease process and treatment process  Description: Define diabetes and identify own type of diabetes; list 3 options for treating diabetes.   Outcome: Progressing Towards Goal

## 2022-07-11 NOTE — PROGRESS NOTES
Verbal shift change report given to KELSY Pavon (oncoming nurse) by Katheryn Vance RN (offgoing nurse). Report included the following information SBAR, Kardex, Intake/Output, MAR and Recent Results.

## 2022-07-11 NOTE — PROGRESS NOTES
Hospitalist Progress Note    Patient: Jeane Montgomery MRN: [id number]  CSN: [id number]    YOB: 1945  Age: 77 y.o. Sex: female    DOA: 7/7/2022 LOS:  LOS: 4 days                Assessment/Plan     Patient Active Problem List   Diagnosis Code    HTN (hypertension), benign I10    Neuropathy G62.9    Hyperlipidemia E78.5    COPD (chronic obstructive pulmonary disease) ([facility] J44.9    CVA (cerebral vascular accident) ([facility] [address] I63.9    Bilateral leg pain M79.604, M79.605    SOB (shortness of breath) R06.02    Hypoxemia R09.02    COPD with acute exacerbation (HCC) J44.1    Neuropathic pain, leg M79.2    Seizures (HCC) R56.9    Diabetes ([facility] [address] E11.9        Chief complaint :  SOB  77 y.o. female with diabetes, hypertension, COPD,  history of stroke, seizures who presents to ER with concerns of chest pain, shortness of breath and bilateral leg pain. COPD exacerbation -  Taper steroids  Brovana, pulmicort  Neb treatment as needed. Empiric antibiotics ceftriaxone, azithromycin. CTA chest with No evidence of pulmonary embolism or acute intrathoracic abnormality.     DM -  Continue with lantus  Started on SSI, ADA diet.     HTN -  Continue home meds,  Monitor BP     Seizures -  Continue with keppra     Neuropathy -  Continue with Neurontin.     DVT prophylaxis with lovenox.     PT/OT, now recommend Rehab    Disposition :  1-2 days    Review of systems  General: No fevers or chills. Cardiovascular: No chest pain or pressure. No palpitations. Pulmonary: No shortness of breath. Gastrointestinal: No nausea, vomiting. Physical Exam:  General: Awake, cooperative, no acute distress    HEENT: NC, Atraumatic. PERRLA, anicteric sclerae. Lungs: CTA Bilaterally. No Wheezing/Rhonchi/Rales. Heart:  S1 S2,  No murmur, No Rubs, No Gallops  Abdomen: Soft, Non distended, Non tender.  +Bowel sounds,   Extremities: No c/c/e  Psych:   Not anxious or agitated. Neurologic:  No acute neurological deficit. Vital signs/Intake and Output:  Visit Vitals  /88 (BP 1 Location: Right upper arm, BP Patient Position: Sitting)   Pulse 70   Temp 98.3 °F (36.8 °C)   Resp 18   Ht 5' 4\" (1.626 m)   Wt 91.2 kg (201 lb)   SpO2 95%   BMI 34.50 kg/m²     Current Shift:  07/11 0701 - 07/11 1900  In: 240 [P.O.:240]  Out: -   Last three shifts:  07/09 1901 - 07/11 0700  In: 1800 [P.O.:1800]  Out: 500 [Urine:500]            Labs: Results:       Chemistry Recent Labs     07/10/22  0446 07/09/22  0111   * 318*    134*   K 4.6 4.9    104   CO2 27 24   BUN 20* 21*   CREA 0.87 0.97   CA 8.7 8.5   AGAP 4 6   BUCR 23* 22*      CBC w/Diff Recent Labs     07/10/22  0446 07/09/22  0111   WBC 9.4 11.3   RBC 4.51 4.45   HGB 10.6* 10.5*   HCT 35.2 34.5*    303   GRANS 73 91*   LYMPH 20* 7*   EOS 0 0      Cardiac Enzymes No results for input(s): CPK, CKND1, MAGGIE in the last 72 hours. No lab exists for component: CKRMB, TROIP   Coagulation No results for input(s): PTP, INR, APTT, INREXT, INREXT in the last 72 hours. Lipid Panel Lab Results   Component Value Date/Time    Cholesterol, total 127 07/28/2018 06:06 AM    HDL Cholesterol 35 (L) 07/28/2018 06:06 AM    LDL, calculated 61.6 07/28/2018 06:06 AM    VLDL, calculated 30.4 07/28/2018 06:06 AM    Triglyceride 152 (H) 07/28/2018 06:06 AM    CHOL/HDL Ratio 3.6 07/28/2018 06:06 AM      BNP No results for input(s): BNPP in the last 72 hours. Liver Enzymes No results for input(s): TP, ALB, TBIL, AP in the last 72 hours.     No lab exists for component: SGOT, GPT, DBIL   Thyroid Studies Lab Results   Component Value Date/Time    TSH 5.60 (H) 02/25/2019 05:50 AM        Procedures/imaging: see electronic medical records for all procedures/Xrays and details which were not copied into this note but were reviewed prior to creation of Plan

## 2022-07-11 NOTE — PROGRESS NOTES
Problem: Pressure Injury - Risk of  Goal: *Prevention of pressure injury  Description: Document Ryne Scale and appropriate interventions in the flowsheet. Outcome: Progressing Towards Goal  Note: Pressure Injury Interventions:  Sensory Interventions: Assess changes in LOC    Moisture Interventions: Absorbent underpads    Activity Interventions: Pressure redistribution bed/mattress(bed type)    Mobility Interventions: Pressure redistribution bed/mattress (bed type)    Nutrition Interventions: Document food/fluid/supplement intake    Friction and Shear Interventions: Minimize layers                Problem: Patient Education: Go to Patient Education Activity  Goal: Patient/Family Education  Outcome: Progressing Towards Goal     Problem: Falls - Risk of  Goal: *Absence of Falls  Description: Document Satinder Fall Risk and appropriate interventions in the flowsheet.   Outcome: Progressing Towards Goal  Note: Fall Risk Interventions:  Mobility Interventions: Patient to call before getting OOB         Medication Interventions: Patient to call before getting OOB    Elimination Interventions: Call light in reach    History of Falls Interventions: Consult care management for discharge planning

## 2022-07-11 NOTE — PROGRESS NOTES
conducted a Follow up consultation and Spiritual Assessment for Luis F Carrington, who is a 77 y.o.,female. The  provided the following Interventions:  Continued the relationship of care and support. Listened empathically. Offered assurance of continued prayer on patients behalf. Chart reviewed. The following outcomes were achieved:  Patient expressed gratitude for pastoral care visit. Assessment:  There are no spiritual or Mosque issues which require intervention at this time. Plan:  Chaplains will continue to follow and will provide pastoral care on an as needed/requested basis.  recommends bedside caregivers page  on duty if patient shows signs of acute spiritual or emotional distress.        Sister Anisa Ham, [place], [address]  [phone]

## 2022-07-11 NOTE — PROGRESS NOTES
Oxygen Walk Test    Step 1)   Check patient at rest, without oxygen. Record: Oxygen saturation 97% on room air, at rest.    If oxygen is less that 88%, conclude this test. If more than 88%, continue to step 2. Step 2)  Check patient during 6-minute ambulation/activity. Record: Oxygen saturation is 87% with exertion while walking for 6 minutes. Allow patient to rest as needed and apply oxygen. Step 3)  Check patient at rest again, after applying oxygen. Titrate to maintain >88%    Record: Oxygen saturation is 99%, while at rest, with 2 L/min Oxygen. Step 4)   Check patient during 6-minute ambulation/activity with applied oxygen. Record: Oxygen saturation is 95% with exertion, while walking for 6 minutes, with 2 L/min Oxygen.      Check delivery system:  Via:     [x] Nasal Cannula           [] Simple Face Mask            [] Non-Re-Breather Mask          [] Partial Re-Breather Mask            [] Venturi Mask

## 2022-07-11 NOTE — DIABETES MGMT
GLYCEMIC CONTROL PLAN OF CARE      Diabetes/ Glycemic Control Plan of Care  Recommendations: steroid associated hyperglycemia  - increase mealtime insulin to address meal hyperglycemia while on steroids   *Humalog 14 units qac  - recommend adjustment to home regimen to address current A1C with OP PCP follow up    Addendum:  - pt told this writer basal insulin has not been adjusted in 2 years  HbA1C May 2021 9.3%  - recommend increase basal insulin to address current A1C    Assessment: known h/o T2DM, HbA1C not within recommended range for age + comorbids basal/oral home regimen  DX:   1. Acute exacerbation of chronic obstructive pulmonary disease (COPD) ([address]     2. Atypical chest pain     3. Neuropathic pain of both legs     4.  Hypoxemia        Fasting/ Morning blood glucose:   Lab Results   Component Value Date/Time    Glucose 178 (H) 07/10/2022 04:46 AM    Glucose (POC) 161 (H) 07/11/2022 07:35 AM     Recent blood glucose:   Lab Results   Component Value Date/Time    GLUCPOC 161 (H) 07/11/2022 07:35 AM    GLUCPOC 303 (H) 07/10/2022 09:06 PM    GLUCPOC 257 (H) 07/10/2022 03:16 PM       IV Fluids containing dextrose: no  Steroids:   Rx Glucocorticoids (24h ago, onward)             Start     Dose Route Frequency Ordered Stop    07/10/22 0800  predniSONE (DELTASONE) tablet 50 mg         50 mg PO DAILY WITH BREAKFAST 07/09/22 1632 --               Rx Glucocorticoids (24h ago, onward)             Start     Dose Route Frequency Ordered Stop    07/10/22 0800  predniSONE (DELTASONE) tablet 50 mg         50 mg PO DAILY WITH BREAKFAST 07/09/22 1632 --              Blood glucose values: 161-303 mg/dL    Within target range (non-ICU: <140; ICU<180):  [] Yes    [x] No  Current insulin orders: Lantus 33 units + Humalog 10 qac + - Humalog Normal Insulin Sensitivity Corrective Coverage  Total Daily Dose previous 24 hours = 85 (33 Lantus + 30(10) + 22 - Humalog Normal Insulin Sensitivity Corrective Coverage)  Current A1c:   Lab Results   Component Value Date/Time    Hemoglobin A1c 10.8 (H) 07/10/2022 04:46 AM      equivalent  to ave Blood Glucose of 263 mg/dl for 2-3 months prior to admission  Adequate glycemic control PTA:   [] Yes   [x] No  Nutrition/Diet:   Active Orders   Diet    ADULT DIET Regular; 4 carb choices (60 gm/meal); Low Sodium (2 gm)      Meal Intake:  Patient Vitals for the past 168 hrs:   % Diet Eaten   07/11/22 0823 76 - 100%   07/10/22 1826 76 - 100%   07/10/22 1250 51 - 75%     Supplement Intake:  No data found. Home diabetes medications:   Key Antihyperglycemic Medications             metFORMIN (GLUCOPHAGE) 850 mg tablet (Taking) Take 1 Tab by mouth three (3) times daily. insulin glargine (LANTUS) 100 unit/mL injection (Taking) 30 Units by SubCUTAneous route daily. Plan/Goals:   Blood Glucose will be within target range within 72 hours  Reinforce dietary and medication compliance at home.        Education:  [x] Refer to Diabetes Education Record                       [] Education not indicated at this time       Samson Scott MS, RN, CDE  Glycemic Control Team  [phone]

## 2022-07-11 NOTE — DIABETES MGMT
GLYCEMIC CONTROL PLAN OF CARE      Diabetes/ Glycemic Control Plan of Care  Recommendations: steroid associated hyperglycemia  - increase mealtime insulin to address meal hyperglycemia while on steroids   *Humalog 14 units qac  - recommend adjustment to home regimen to address current A1C with OP PCP follow up    Addendum:  - pt told this writer basal insulin has not been adjusted in 2 years  HbA1C May 2021 9.3%  - recommend increase basal insulin to address current A1C    Assessment: known h/o T2DM, HbA1C not within recommended range for age + comorbids basal/oral home regimen  DX:   1. Acute exacerbation of chronic obstructive pulmonary disease (COPD) ([address]     2. Atypical chest pain     3. Neuropathic pain of both legs     4.  Hypoxemia        Fasting/ Morning blood glucose:   Lab Results   Component Value Date/Time    Glucose 178 (H) 07/10/2022 04:46 AM    Glucose (POC) 161 (H) 07/11/2022 07:35 AM     Recent blood glucose:   Lab Results   Component Value Date/Time    GLUCPOC 161 (H) 07/11/2022 07:35 AM    GLUCPOC 303 (H) 07/10/2022 09:06 PM    GLUCPOC 257 (H) 07/10/2022 03:16 PM       IV Fluids containing dextrose: no  Steroids:   Rx Glucocorticoids (24h ago, onward)             Start     Dose Route Frequency Ordered Stop    07/10/22 0800  predniSONE (DELTASONE) tablet 50 mg         50 mg PO DAILY WITH BREAKFAST 07/09/22 1632 --               Rx Glucocorticoids (24h ago, onward)             Start     Dose Route Frequency Ordered Stop    07/10/22 0800  predniSONE (DELTASONE) tablet 50 mg         50 mg PO DAILY WITH BREAKFAST 07/09/22 1632 --              Blood glucose values: 161-303 mg/dL    Within target range (non-ICU: <140; ICU<180):  [] Yes    [x] No  Current insulin orders: Lantus 33 units + Humalog 10 qac + - Humalog Normal Insulin Sensitivity Corrective Coverage  Total Daily Dose previous 24 hours = 85 (33 Lantus + 30(10) + 22 - Humalog Normal Insulin Sensitivity Corrective Coverage)  Current A1c:   Lab Results   Component Value Date/Time    Hemoglobin A1c 10.8 (H) 07/10/2022 04:46 AM      equivalent  to ave Blood Glucose of 263 mg/dl for 2-3 months prior to admission  Adequate glycemic control PTA:   [] Yes   [x] No  Nutrition/Diet:   Active Orders   Diet    ADULT DIET Regular; 4 carb choices (60 gm/meal); Low Sodium (2 gm)      Meal Intake:  Patient Vitals for the past 168 hrs:   % Diet Eaten   07/11/22 0823 76 - 100%   07/10/22 1826 76 - 100%   07/10/22 1250 51 - 75%     Supplement Intake:  No data found. Home diabetes medications:   Key Antihyperglycemic Medications             metFORMIN (GLUCOPHAGE) 850 mg tablet (Taking) Take 1 Tab by mouth three (3) times daily. insulin glargine (LANTUS) 100 unit/mL injection (Taking) 30 Units by SubCUTAneous route daily. Plan/Goals:   Blood Glucose will be within target range within 72 hours  Reinforce dietary and medication compliance at home.        Education:  [x] Refer to Diabetes Education Record                       [] Education not indicated at this time       Shay Miller MS, RN, CDE  Glycemic Control Team  [phone]

## 2022-07-11 NOTE — PROGRESS NOTES
Problem: Mobility Impaired (Adult and Pediatric)  Goal: *Acute Goals and Plan of Care (Insert Text)  Description: Physical Therapy Goals   Initiated 7/11/2022 and to be accomplished within 7 day(s) while maintaining O2 sat >93%  1. Patient will move from supine <> sit with S in prep for out of bed activity and change of position. 2.  Patient will perform sit<> stand with S with LRAD in prep for transfers/ambulation. 3.  Patient will transfer from bed <> chair with S with LRAD for time up in chair for completion of ADL activity. 4.  Patient will ambulate 100 feet with S/LRAD for improved functional mobility at discharge. 5.  Patient will ascend/descend 3-5 stairs with handrail(s) with minimal assistance/contact guard assist for home re-entry as needed. PLOF:  Independent ambulation with cane/RW    Outcome: Progressing Towards Goal  physical Therapy TREATMENT    Patient: Minh Avendaño (49 y.o. female)  Date: 7/11/2022  Diagnosis: COPD with acute exacerbation (HCC) [J44.1]  Hypoxemia [R09.02]  Neuropathic pain, leg [M79.2] COPD with acute exacerbation (HCC)  Precautions: Fall   Chart, physical therapy assessment, plan of care and goals were reviewed. ASSESSMENT:  Pt found supine in bed. Previously notified by nurse Мария Ball that pt requires Walk Test.  Nurse to complete while PT assisting pt with GT. Pt supine>sit with supervision, and STS with RW/SB/CGA. Participated with GT RW/GB/CGA initially 45ft. Xochilt slow, steady, but decr'd with time; pt c/o dizziness, at which time chair brought to pt for seated rest.   After resting ~5 min, O2 Walk Test re-initiated. Gt distance 80ft with RW/CG/min A with pt c/o dizziness/weakness and chair brought to pt. Per nurse pt desat from 97% to 87% (during gt 3 min). O2 applied at 2L nc and pt increased to 97% O2 sat. Pt continued seated rest, transport chair brought to pt and pt transported back to room via same.   Overall poor activity tolerance and increased fall risk r/t same, as well as weakness. Pt reports h/o 2 falls recently. Recommend SNF at discharge in view of above prior to return home to spouse who works as  and is gone often for work. Care manager Anette notified of same. Progression toward goals:  []      Improving appropriately and progressing toward goals  []      Improving slowly and progressing toward goals  []      Not making progress toward goals and plan of care will be adjusted     PLAN:  Patient continues to benefit from skilled intervention to address the above impairments. Continue treatment per established plan of care. Discharge Recommendations:  Skilled Nursing Facility  Further Equipment Recommendations for Discharge:  N/A     SUBJECTIVE:   Patient stated Better than I was.     OBJECTIVE DATA SUMMARY:   Critical Behavior:  Neurologic State: Alert  Orientation Level: Oriented X4  Cognition: Follows commands  Safety/Judgement: Fall prevention  Functional Mobility Training:  Bed Mobility:  Supine to Sit: Supervision  Sit to Supine: Stand-by assistance  Transfers:  Sit to Stand: Stand-by assistance;Contact guard assistance  Stand to Sit: Contact guard assistance;Minimum assistance (Min A during Walk test)  Balance:  Sitting: Intact  Standing: Impaired; With support  Standing - Static: Good  Standing - Dynamic : Fair  Ambulation/Gait Training:  Distance (ft): 80 Feet (ft)  Assistive Device: Gait belt;Walker, rolling  Ambulation - Level of Assistance: Contact guard assistance;Minimal assistance (min A during walk test due to dizziness/fatigue)  Gait Abnormalities: Decreased step clearance  Speed/Xochilt: Slow  Step Length: Right shortened;Left shortened  Pain:  Pain Scale 1: Numeric (0 - 10)  Pain Intensity 1: 3  Activity Tolerance:   Poor: see nurse noted for walk test results  Please refer to the flowsheet for vital signs taken during this treatment.   After treatment:   [] Patient left in no apparent distress sitting up in chair  [x] Patient left in no apparent distress in bed  [x] Call bell left within reach  [x] Nursing notified  [] Caregiver present  [] Bed alarm activated      Marielena Bone, PT   Time Calculation: 38 mins

## 2022-07-11 NOTE — PROGRESS NOTES
conducted a Follow up consultation and Spiritual Assessment for Tima Rodriguez, who is a 77 y.o.,female. The  provided the following Interventions:  Continued the relationship of care and support. Listened empathically. Offered assurance of continued prayer on patients behalf. Chart reviewed. The following outcomes were achieved:  Patient expressed gratitude for pastoral care visit. Assessment:  There are no spiritual or Orthodoxy issues which require intervention at this time. Plan:  Chaplains will continue to follow and will provide pastoral care on an as needed/requested basis.  recommends bedside caregivers page  on duty if patient shows signs of acute spiritual or emotional distress.        Sister Olga Omalley, [address] [phone]

## 2022-07-11 NOTE — DIABETES MGMT
Diabetes Patient/Family Education Record    Factors That May Influence Patients Ability to Learn or Comply with Recommendations   []   Language barrier    []   Cultural needs   []   Motivation    []   Cognitive limitation    []   Physical   []   Education    []   Physiological factors   []   Hearing/vision/speaking impairment   []   Latter-day beliefs    []   Financial factors   []  Other:   [x]  No factors identified at this time. Person Instructed:   [x]   Patient   []   Family   []  Other     Preference for Learning:   [x]   Verbal   []   Written   []  Demonstration     Level of Comprehension & Competence:    [x]  Good                                      [] Fair                                     []  Poor                             []  Needs Reinforcement   []  Teach back completed    Education Component:   [x]  Medication management, including confirmation of home regimen    [x]  Nutritional management - [x] Obtained usual meal pattern   []   Basic carbohydrate counting  []  Plate method  []  Limit concentrated sweets and avoid sweetened beverages  [x]  Portion control  [x]    Avoid skipping meals   []  Exercise   []  Signs, symptoms, and treatment of hyperglycemia and hypoglycemia   [] Prevention, recognition and treatment of hyperglycemia and hypoglycemia   [x]  Importance of blood glucose monitoring  [] Blood Glucose targets   []   Provided patient with blood glucose meter  [x]  Has glucometer and supplies at home   []  Instruction on use of the blood glucose meter and recommended monitoring schedule   [x]  Discuss the importance of HbA1C monitoring. Patients A1c is 10.8___ %.  This is equivalent to average glucose of __263_ mg/dl for the past 2-3 months.   []  Sick day guidelines   []  Proper use and disposal of lancets, needles, syringes or insulin pens (if appropriate)   []  Potential long-term complications (retinopathy, kidney disease, neuropathy, foot care)   [] Information about whom to contact in case of emergency or for more information    [x]  Goal:  Patient/family will demonstrate understanding of Diabetes Self- Management Skills by: (date) __9/30_____  Plan for post-discharge education or self-management support:    [] Outpatient class schedule provided            [] Patient Declined    [] Scheduled for outpatient classes (date) _______    [] Written information provided  Verify: [x] Prior to admission Diabetes medications    Does patient understand how diabetes medications work? __________yes__________________  Does patient have difficulty obtaining diabetes medications or testing supplies? _____no____________       Lawyer Sugey ANSARI, RN, CDE  Glycemic Control Team  [phone]

## 2022-07-11 NOTE — PROGRESS NOTES
Problem: Mobility Impaired (Adult and Pediatric)  Goal: *Acute Goals and Plan of Care (Insert Text)  Description: Physical Therapy Goals   Initiated 7/11/2022 and to be accomplished within 7 day(s) while maintaining O2 sat >93%  1. Patient will move from supine <> sit with S in prep for out of bed activity and change of position. 2.  Patient will perform sit<> stand with S with LRAD in prep for transfers/ambulation. 3.  Patient will transfer from bed <> chair with S with LRAD for time up in chair for completion of ADL activity. 4.  Patient will ambulate 100 feet with S/LRAD for improved functional mobility at discharge. 5.  Patient will ascend/descend 3-5 stairs with handrail(s) with minimal assistance/contact guard assist for home re-entry as needed. PLOF:  Independent ambulation with cane/RW    Outcome: Progressing Towards Goal  physical Therapy TREATMENT    Patient: Luli Cardenas (89 y.o. female)  Date: 7/11/2022  Diagnosis: COPD with acute exacerbation (HCC) [J44.1]  Hypoxemia [R09.02]  Neuropathic pain, leg [M79.2] COPD with acute exacerbation (HCC)  Precautions: Fall   Chart, physical therapy assessment, plan of care and goals were reviewed. ASSESSMENT:  Pt found supine in bed. Previously notified by nurse Nick Frazier that pt requires Walk Test.  Nurse to complete while PT assisting pt with GT. Pt supine>sit with supervision, and STS with RW/SB/CGA. Participated with GT RW/GB/CGA initially 45ft. Xochilt slow, steady, but decr'd with time; pt c/o dizziness, at which time chair brought to pt for seated rest.   After resting ~5 min, O2 Walk Test re-initiated. Gt distance 80ft with RW/CG/min A with pt c/o dizziness/weakness and chair brought to pt. Per nurse pt desat from 97% to 87% (during gt 3 min). O2 applied at 2L nc and pt increased to 97% O2 sat. Pt continued seated rest, transport chair brought to pt and pt transported back to room via same.   Overall poor activity tolerance and increased fall risk r/t same, as well as weakness. Pt reports h/o 2 falls recently. Recommend SNF at discharge in view of above prior to return home to spouse who works as  and is gone often for work. Care manager Anette notified of same. Progression toward goals:  []      Improving appropriately and progressing toward goals  []      Improving slowly and progressing toward goals  []      Not making progress toward goals and plan of care will be adjusted     PLAN:  Patient continues to benefit from skilled intervention to address the above impairments. Continue treatment per established plan of care. Discharge Recommendations:  Skilled Nursing Facility  Further Equipment Recommendations for Discharge:  N/A     SUBJECTIVE:   Patient stated Better than I was.     OBJECTIVE DATA SUMMARY:   Critical Behavior:  Neurologic State: Alert  Orientation Level: Oriented X4  Cognition: Follows commands  Safety/Judgement: Fall prevention  Functional Mobility Training:  Bed Mobility:  Supine to Sit: Supervision  Sit to Supine: Stand-by assistance  Transfers:  Sit to Stand: Stand-by assistance;Contact guard assistance  Stand to Sit: Contact guard assistance;Minimum assistance (Min A during Walk test)  Balance:  Sitting: Intact  Standing: Impaired; With support  Standing - Static: Good  Standing - Dynamic : Fair  Ambulation/Gait Training:  Distance (ft): 80 Feet (ft)  Assistive Device: Gait belt;Walker, rolling  Ambulation - Level of Assistance: Contact guard assistance;Minimal assistance (min A during walk test due to dizziness/fatigue)  Gait Abnormalities: Decreased step clearance  Speed/Xochilt: Slow  Step Length: Right shortened;Left shortened  Pain:  Pain Scale 1: Numeric (0 - 10)  Pain Intensity 1: 3  Activity Tolerance:   Poor: see nurse noted for walk test results  Please refer to the flowsheet for vital signs taken during this treatment.   After treatment:   [] Patient left in no apparent distress sitting up in chair  [x] Patient left in no apparent distress in bed  [x] Call bell left within reach  [x] Nursing notified  [] Caregiver present  [] Bed alarm activated      Sirisha Cross, PT   Time Calculation: 38 mins

## 2022-07-11 NOTE — DIABETES MGMT
Diabetes Patient/Family Education Record    Factors That May Influence Patients Ability to Learn or Comply with Recommendations   []   Language barrier    []   Cultural needs   []   Motivation    []   Cognitive limitation    []   Physical   []   Education    []   Physiological factors   []   Hearing/vision/speaking impairment   []   Baptism beliefs    []   Financial factors   []  Other:   [x]  No factors identified at this time. Person Instructed:   [x]   Patient   []   Family   []  Other     Preference for Learning:   [x]   Verbal   []   Written   []  Demonstration     Level of Comprehension & Competence:    [x]  Good                                      [] Fair                                     []  Poor                             []  Needs Reinforcement   []  Teach back completed    Education Component:   [x]  Medication management, including confirmation of home regimen    [x]  Nutritional management - [x] Obtained usual meal pattern   []   Basic carbohydrate counting  []  Plate method  []  Limit concentrated sweets and avoid sweetened beverages  [x]  Portion control  [x]    Avoid skipping meals   []  Exercise   []  Signs, symptoms, and treatment of hyperglycemia and hypoglycemia   [] Prevention, recognition and treatment of hyperglycemia and hypoglycemia   [x]  Importance of blood glucose monitoring  [] Blood Glucose targets   []   Provided patient with blood glucose meter  [x]  Has glucometer and supplies at home   []  Instruction on use of the blood glucose meter and recommended monitoring schedule   [x]  Discuss the importance of HbA1C monitoring. Patients A1c is 10.8___ %.  This is equivalent to average glucose of __263_ mg/dl for the past 2-3 months.   []  Sick day guidelines   []  Proper use and disposal of lancets, needles, syringes or insulin pens (if appropriate)   []  Potential long-term complications (retinopathy, kidney disease, neuropathy, foot care)   [] Information about whom to contact in case of emergency or for more information    [x]  Goal:  Patient/family will demonstrate understanding of Diabetes Self- Management Skills by: (date) __9/30_____  Plan for post-discharge education or self-management support:    [] Outpatient class schedule provided            [] Patient Declined    [] Scheduled for outpatient classes (date) _______    [] Written information provided  Verify: [x] Prior to admission Diabetes medications    Does patient understand how diabetes medications work? __________yes__________________  Does patient have difficulty obtaining diabetes medications or testing supplies? _____no____________       John Deluca MS, RN, CDE  Glycemic Control Team  [phone]

## 2022-07-11 NOTE — PROGRESS NOTES
OCCUPATIONAL THERAPY TREATMENT    Patient: Luis F Carrington (58 y.o. female)  Date: 7/11/2022  Diagnosis: COPD with acute exacerbation (HCC) [J44.1]  Hypoxemia [R09.02]  Neuropathic pain, leg [M79.2] COPD with acute exacerbation [facility])       Precautions: Fall      Chart, occupational therapy assessment, plan of care, and goals were reviewed. ASSESSMENT:  Pt present sitting upright in bed, reports she had confusion again this morning when she woke up, pt is currently oriented x4. Pt albe to perform sit to stand with SBA with FWW, able to perform functional reaching task with no LOB, with cues needed for deep breathing. Pt begins to report feeling poor. O2 remains at 99% with 2L via NC. BP taken at 154/74. Get pt in supine where BP is taken again, remains the same. Pt left to rest for now. Progression toward goals:  []          Improving appropriately and progressing toward goals  [x]          Improving slowly and progressing toward goals  []          Not making progress toward goals and plan of care will be adjusted     PLAN:  Patient continues to benefit from skilled intervention to address the above impairments. Continue treatment per established plan of care. Discharge Recommendations:  Home Health  Further Equipment Recommendations for Discharge:  TBD     SUBJECTIVE:   Patient stated I feel bad all the time like someone is dancing on my chest.    OBJECTIVE DATA SUMMARY:   Cognitive/Behavioral Status:  Neurologic State: Alert  Orientation Level: Oriented X4  Cognition: Follows commands  Safety/Judgement: Fall prevention    Functional Mobility and Transfers for ADLs:   Bed Mobility:     Supine to Sit: Contact guard assistance  Sit to Supine: Contact guard assistance      Transfers:  Sit to Stand: Stand-by assistance    Balance:  Sitting: Intact  Standing: Intact; With support    ADL Intervention:     Cognitive Retraining  Safety/Judgement: Fall prevention    Pain:  Pain level pre-treatment: 5/10   Pain level post-treatment: 5/10  Pain Intervention(s): Medication administered by RN (see MAR); Rest, Ice, Repositioning   Response to intervention: Nurse notified, See doc flow sheet    Activity Tolerance:    Low today, pt has increase in BP following activities while O2 remains high on 2L via NC     Please refer to the flowsheet for vital signs taken during this treatment. After treatment:   []  Patient left in no apparent distress sitting up in chair  [x]  Patient left in no apparent distress in bed  [x]  Call bell left within reach  [x]  Nursing notified  []  Caregiver present  []  Bed alarm activated    COMMUNICATION/EDUCATION:   [x] Role of Occupational Therapy in the acute care setting  [x] Home safety education was provided and the patient/caregiver indicated understanding. [x] Patient/family have participated as able in working towards goals and plan of care. [x] Patient/family agree to work toward stated goals and plan of care. [] Patient understands intent and goals of therapy, but is neutral about his/her participation. [] Patient is unable to participate in goal setting and plan of care.       Thank you for this referral.  Ahmet TRINIDAD, OTR/L  Time Calculation: 13 mins

## 2022-07-11 NOTE — PROGRESS NOTES
D/C Plan: SNF    CM rounded with care team to discuss care transition. Noted pt is currently on O2, but has not been prescribed O2 in the home. Pt had a walk test with pt's bedside nurse and therapy. Pt became dizzy and O2 sats dropped to 87%. Therapy is now recommending SNF for this pt. CM met with pt again to discuss care transition. Pt is agreeable to SNF. CM provided pt with a list of area SNF to review. Pt is agreeable to having clinical information sent to facilities in [facility], with the exception of [address]. CMS has been notified to assist.  Anticipate pt will transition to SNF once an accepting facility has been identifeid and insurance auth has been obtained. CM to continue to follow and assist.      Interdisciplinary Round Note   Patient Information: Patient Information: Radha Neely                                      306/01   Reason for Admission: COPD with acute exacerbation ([facility] [J44.1]  Hypoxemia [R09.02]  Neuropathic pain, leg [M79.2]   Attending Provider:   Enoch Castañeda MD   Past Medical History:   Past Medical History:   Diagnosis Date    Asthma     Chronic obstructive pulmonary disease ([facility]     Diabetes ([facility] [facility]     HTN (hypertension), benign     Hyperlipidemia     Menopause     Myocardial infarction ([facility]     Neuropathy     Sciatica     Seizures ([facility]     Stroke [facility])       Hospital day: 4  RRAT Score: Medium Risk            15 Total Score    3 Has Seen PCP in Last 6 Months (Yes=3, No=0)    12 Charlson Comorbidity Score (Age + Comorbid Conditions)        Criteria that do not apply:    . Living with Significant Other. Assisted Living. LTAC. SNF. or   Rehab    Patient Length of Stay (>5 days = 3)    IP Visits Last 12 Months (1-3=4, 4=9, >4=11)    Pt.  Coverage (Medicare=5 , Medicaid, or Self-Pay=4)      Retired [address]: Spouse/Significant Other, History of Falls Within Past 3 Months: Yes, Needs Assistance with Wound Care AND/OR Mgnt of O2, Nebulizer: No, Requires Financial, Physical and/or Educational Assistance With Medications: No, History of Mental Illness: No, Living Alone: No              VITAL SIGNS  Vitals:    07/11/22 0752 07/11/22 0812 07/11/22 1152 07/11/22 1530   BP: 124/69  114/88 (!) 143/59   Pulse: (!) 54 (!) 58 70 60   Resp: 18  18 16   Temp: 98.1 °F (36.7 °C)  98.3 °F (36.8 °C) 98 °F (36.7 °C)   SpO2: 100%  95% 100%   Weight:       Height:              Lines, Drains, & Airways    Peripheral IV 07/07/22 Left Forearm-Site Assessment: Clean, dry, & intact  [REMOVED] Peripheral IV 07/07/22 Right Wrist-Site Assessment: Clean, dry, & intact      VTE Prophylaxis                                   Intake and Output:   07/09 1901 - 07/11 0700  In: 1800 [P.O.:1800]  Out: 500 [Urine:500]  07/11 0701 - 07/11 1900  In: 240 [P.O.:240]  Out: -                Current Diet: ADULT DIET Regular; 4 carb choices (60 gm/meal); Low Sodium (2 gm)       Abdominal   Last Bowel Movement Date: 07/10/22  Bowel Sounds:  Active   Nutrition  Chewing/Swallowing Problems: No  Difficulty with Secretions: No  Speech Slurred/Thick/Garbled: No     Recent Glucose Results:   Lab Results   Component Value Date/Time    GLUCPOC 204 (H) 07/11/2022 11:50 AM    GLUCPOC 161 (H) 07/11/2022 07:35 AM    GLUCPOC 303 (H) 07/10/2022 09:06 PM        Sepsis Re-Assessment Documentation:     Date: 7/11/2022   Time: 3:51 PM  Lactic Acid level:      Vital Signs  Level of Consciousness: Alert (0)  Temp: 98 °F (36.7 °C)  Temp Source: Oral  Pulse (Heart Rate): 60  Heart Rate Source: Monitor  Resp Rate: 16  BP: (!) 143/59  MAP (Monitor): 94  MAP (Calculated): 87  BP 1 Location: Right upper arm  BP 1 Method: Automatic  BP Patient Position: Sitting  MEWS Score: 1    Vitals:    07/11/22 0752 07/11/22 0812 07/11/22 1152 07/11/22 1530   BP: 124/69  114/88 (!) 143/59   Pulse: (!) 54 (!) 58 70 60   Resp: 18  18 16   Temp: 98.1 °F (36.7 °C)  98.3 °F (36.8 °C) 98 °F (36.7 °C)   SpO2: 100%  95% 100%      Vitals:    07/11/22 0752 07/11/22 0812 07/11/22 1152 07/11/22 1530   BP: 124/69  114/88 (!) 143/59   Pulse: (!) 54 (!) 58 70 60   Resp: 18  18 16   Temp: 98.1 °F (36.7 °C)  98.3 °F (36.8 °C) 98 °F (36.7 °C)   SpO2: 100%  95% 100%               Activity Level: Activity Level: Up with Assistance   Current Immunizations:  Immunization History   Administered Date(s) Administered    Pneumococcal Vaccine (Unspecified Type) 05/01/2014      Recommendations:     IV Antibiotics    Barriers: limited assistance at home   COVID status: No active infections     Will the patient require COVID testing prior to discharge for placement?: YES    Plan: SNF    Interdisciplinary team rounds were held with the following team members MD, Bedside RN, care manager. Plan of care discussed. See clinical pathway and/or care plan for interventions and desired outcomes. Care Management Interventions  Mode of Transport at Discharge:  Other (see comment) (Family)  Transition of Care Consult (CM Consult): Discharge [address]: No  Health Maintenance Reviewed: Yes  Physical Therapy Consult: Yes  Occupational Therapy Consult: Yes  Support Systems: Spouse/Significant Other  The Plan for Transition of Care is Related to the Following Treatment Goals : SNF  The Patient and/or Patient Representative was Provided with a Choice of Provider and Agrees with the Discharge Plan?: Yes  Name of the Patient Representative Who was Provided with a Choice of Provider and Agrees with the Discharge Plan: pt  Freedom of Choice List was Provided with Basic Dialogue that Supports the Patient's Individualized Plan of Care/Goals, Treatment Preferences and Shares the Quality Data Associated with the Providers?: Yes  Discharge Location  Patient Expects to be Discharged to[de-identified] Skilled nursing facility

## 2022-07-11 NOTE — PROGRESS NOTES
Verbal shift change report given to KELSY Pavon (oncoming nurse) by Teresa Osborne RN (offgoing nurse). Report included the following information SBAR, Kardex, Intake/Output, MAR and Recent Results.

## 2022-07-11 NOTE — PROGRESS NOTES
Hospitalist Progress Note    Patient: Abbey Sizer MRN: [id number]  CSN: [id number]    YOB: 1945  Age: 77 y.o. Sex: female    DOA: 7/7/2022 LOS:  LOS: 4 days                Assessment/Plan     Patient Active Problem List   Diagnosis Code    HTN (hypertension), benign I10    Neuropathy G62.9    Hyperlipidemia E78.5    COPD (chronic obstructive pulmonary disease) ([facility] [address] J44.9    [facility] I63.9    Bilateral leg pain M79.604, M79.605    SOB (shortness of breath) R06.02    Hypoxemia R09.02    COPD with acute exacerbation (HCC) J44.1    Neuropathic pain, leg M79.2    Seizures (HCC) R56.9    Diabetes ([facility] [address] E11.9        Chief complaint :  SOB  77 y.o. female with diabetes, hypertension, COPD,  history of stroke, seizures who presents to ER with concerns of chest pain, shortness of breath and bilateral leg pain. COPD exacerbation -  Taper steroids  Brovana, pulmicort  Neb treatment as needed. Empiric antibiotics ceftriaxone, azithromycin. CTA chest with No evidence of pulmonary embolism or acute intrathoracic abnormality.     DM -  Continue with lantus  Started on SSI, ADA diet.     HTN -  Continue home meds,  Monitor BP     Seizures -  Continue with keppra     Neuropathy -  Continue with Neurontin.     DVT prophylaxis with lovenox.     PT/OT, now recommend Rehab    Disposition :  1-2 days    Review of systems  General: No fevers or chills. Cardiovascular: No chest pain or pressure. No palpitations. Pulmonary: No shortness of breath. Gastrointestinal: No nausea, vomiting. Physical Exam:  General: Awake, cooperative, no acute distress    HEENT: NC, Atraumatic. PERRLA, anicteric sclerae. Lungs: CTA Bilaterally. No Wheezing/Rhonchi/Rales. Heart:  S1 S2,  No murmur, No Rubs, No Gallops  Abdomen: Soft, Non distended, Non tender.  +Bowel sounds,   Extremities: No c/c/e  Psych:   Not anxious or agitated. Neurologic:  No acute neurological deficit. Vital signs/Intake and Output:  Visit Vitals  /88 (BP 1 Location: Right upper arm, BP Patient Position: Sitting)   Pulse 70   Temp 98.3 °F (36.8 °C)   Resp 18   Ht 5' 4\" (1.626 m)   Wt 91.2 kg (201 lb)   SpO2 95%   BMI 34.50 kg/m²     Current Shift:  07/11 0701 - 07/11 1900  In: 240 [P.O.:240]  Out: -   Last three shifts:  07/09 1901 - 07/11 0700  In: 1800 [P.O.:1800]  Out: 500 [Urine:500]            Labs: Results:       Chemistry Recent Labs     07/10/22  0446 07/09/22  0111   * 318*    134*   K 4.6 4.9    104   CO2 27 24   BUN 20* 21*   CREA 0.87 0.97   CA 8.7 8.5   AGAP 4 6   BUCR 23* 22*      CBC w/Diff Recent Labs     07/10/22  0446 07/09/22  0111   WBC 9.4 11.3   RBC 4.51 4.45   HGB 10.6* 10.5*   HCT 35.2 34.5*    303   GRANS 73 91*   LYMPH 20* 7*   EOS 0 0      Cardiac Enzymes No results for input(s): CPK, CKND1, MAGGIE in the last 72 hours. No lab exists for component: CKRMB, TROIP   Coagulation No results for input(s): PTP, INR, APTT, INREXT, INREXT in the last 72 hours. Lipid Panel Lab Results   Component Value Date/Time    Cholesterol, total 127 07/28/2018 06:06 AM    HDL Cholesterol 35 (L) 07/28/2018 06:06 AM    LDL, calculated 61.6 07/28/2018 06:06 AM    VLDL, calculated 30.4 07/28/2018 06:06 AM    Triglyceride 152 (H) 07/28/2018 06:06 AM    CHOL/HDL Ratio 3.6 07/28/2018 06:06 AM      BNP No results for input(s): BNPP in the last 72 hours. Liver Enzymes No results for input(s): TP, ALB, TBIL, AP in the last 72 hours.     No lab exists for component: SGOT, GPT, DBIL   Thyroid Studies Lab Results   Component Value Date/Time    TSH 5.60 (H) 02/25/2019 05:50 AM        Procedures/imaging: see electronic medical records for all procedures/Xrays and details which were not copied into this note but were reviewed prior to creation of Plan

## 2022-07-11 NOTE — PROGRESS NOTES
OCCUPATIONAL THERAPY TREATMENT    Patient: Wilberto Newman (54 y.o. female)  Date: 7/11/2022  Diagnosis: COPD with acute exacerbation (HCC) [J44.1]  Hypoxemia [R09.02]  Neuropathic pain, leg [M79.2] COPD with acute exacerbation [facility])       Precautions: Fall      Chart, occupational therapy assessment, plan of care, and goals were reviewed. ASSESSMENT:  Pt present sitting upright in bed, reports she had confusion again this morning when she woke up, pt is currently oriented x4. Pt albe to perform sit to stand with SBA with FWW, able to perform functional reaching task with no LOB, with cues needed for deep breathing. Pt begins to report feeling poor. O2 remains at 99% with 2L via NC. BP taken at 154/74. Get pt in supine where BP is taken again, remains the same. Pt left to rest for now. Progression toward goals:  []          Improving appropriately and progressing toward goals  [x]          Improving slowly and progressing toward goals  []          Not making progress toward goals and plan of care will be adjusted     PLAN:  Patient continues to benefit from skilled intervention to address the above impairments. Continue treatment per established plan of care. Discharge Recommendations:  Home Health  Further Equipment Recommendations for Discharge:  TBD     SUBJECTIVE:   Patient stated I feel bad all the time like someone is dancing on my chest.    OBJECTIVE DATA SUMMARY:   Cognitive/Behavioral Status:  Neurologic State: Alert  Orientation Level: Oriented X4  Cognition: Follows commands  Safety/Judgement: Fall prevention    Functional Mobility and Transfers for ADLs:   Bed Mobility:     Supine to Sit: Contact guard assistance  Sit to Supine: Contact guard assistance      Transfers:  Sit to Stand: Stand-by assistance    Balance:  Sitting: Intact  Standing: Intact; With support    ADL Intervention:     Cognitive Retraining  Safety/Judgement: Fall prevention    Pain:  Pain level pre-treatment: 5/10   Pain level post-treatment: 5/10  Pain Intervention(s): Medication administered by RN (see MAR); Rest, Ice, Repositioning   Response to intervention: Nurse notified, See doc flow sheet    Activity Tolerance:    Low today, pt has increase in BP following activities while O2 remains high on 2L via NC     Please refer to the flowsheet for vital signs taken during this treatment. After treatment:   []  Patient left in no apparent distress sitting up in chair  [x]  Patient left in no apparent distress in bed  [x]  Call bell left within reach  [x]  Nursing notified  []  Caregiver present  []  Bed alarm activated    COMMUNICATION/EDUCATION:   [x] Role of Occupational Therapy in the acute care setting  [x] Home safety education was provided and the patient/caregiver indicated understanding. [x] Patient/family have participated as able in working towards goals and plan of care. [x] Patient/family agree to work toward stated goals and plan of care. [] Patient understands intent and goals of therapy, but is neutral about his/her participation. [] Patient is unable to participate in goal setting and plan of care.       Thank you for this referral.  Sapna TRINIDAD, OTR/L  Time Calculation: 13 mins

## 2022-07-11 NOTE — PROGRESS NOTES
D/C Plan: SNF    CM rounded with care team to discuss care transition. Noted pt is currently on O2, but has not been prescribed O2 in the home. Pt had a walk test with pt's bedside nurse and therapy. Pt became dizzy and O2 sats dropped to 87%. Therapy is now recommending SNF for this pt. CM met with pt again to discuss care transition. Pt is agreeable to SNF. CM provided pt with a list of area SNF to review. Pt is agreeable to having clinical information sent to facilities in [facility], with the exception of [address]. CMS has been notified to assist.  Anticipate pt will transition to SNF once an accepting facility has been identifeid and insurance auth has been obtained. CM to continue to follow and assist.      Interdisciplinary Round Note   Patient Information: Patient Information: Sobeida Morales                                      306/01   Reason for Admission: COPD with acute exacerbation ([facility] [J44.1]  Hypoxemia [R09.02]  Neuropathic pain, leg [M79.2]   Attending Provider:   Ginny Wilson MD   Past Medical History:   Past Medical History:   Diagnosis Date    Asthma     Chronic obstructive pulmonary disease ([facility]     Diabetes ([facility]     HTN (hypertension), benign     Hyperlipidemia     Menopause     Myocardial infarction ([facility]     Neuropathy     Sciatica     Seizures ([facility]     Stroke [facility])       Hospital day: 4  RRAT Score: Medium Risk            15 Total Score    3 Has Seen PCP in Last 6 Months (Yes=3, No=0)    12 Charlson Comorbidity Score (Age + Comorbid Conditions)        Criteria that do not apply:    . Living with Significant Other. Assisted Living. LTAC. SNF. or   Rehab    Patient Length of Stay (>5 days = 3)    IP Visits Last 12 Months (1-3=4, 4=9, >4=11)    Pt.  Coverage (Medicare=5 , Medicaid, or Self-Pay=4)      Retired [address]: Spouse/Significant Other, History of Falls Within Past 3 Months: Yes, Needs Assistance with Wound Care AND/OR Mgnt of O2, Nebulizer: No, Requires Financial, Physical and/or Educational Assistance With Medications: No, History of Mental Illness: No, Living Alone: No              VITAL SIGNS  Vitals:    07/11/22 0752 07/11/22 0812 07/11/22 1152 07/11/22 1530   BP: 124/69  114/88 (!) 143/59   Pulse: (!) 54 (!) 58 70 60   Resp: 18  18 16   Temp: 98.1 °F (36.7 °C)  98.3 °F (36.8 °C) 98 °F (36.7 °C)   SpO2: 100%  95% 100%   Weight:       Height:              Lines, Drains, & Airways    Peripheral IV 07/07/22 Left Forearm-Site Assessment: Clean, dry, & intact  [REMOVED] Peripheral IV 07/07/22 Right Wrist-Site Assessment: Clean, dry, & intact      VTE Prophylaxis                                   Intake and Output:   07/09 1901 - 07/11 0700  In: 1800 [P.O.:1800]  Out: 500 [Urine:500]  07/11 0701 - 07/11 1900  In: 240 [P.O.:240]  Out: -                Current Diet: ADULT DIET Regular; 4 carb choices (60 gm/meal); Low Sodium (2 gm)       Abdominal   Last Bowel Movement Date: 07/10/22  Bowel Sounds:  Active   Nutrition  Chewing/Swallowing Problems: No  Difficulty with Secretions: No  Speech Slurred/Thick/Garbled: No     Recent Glucose Results:   Lab Results   Component Value Date/Time    GLUCPOC 204 (H) 07/11/2022 11:50 AM    GLUCPOC 161 (H) 07/11/2022 07:35 AM    GLUCPOC 303 (H) 07/10/2022 09:06 PM        Sepsis Re-Assessment Documentation:     Date: 7/11/2022   Time: 3:51 PM  Lactic Acid level:      Vital Signs  Level of Consciousness: Alert (0)  Temp: 98 °F (36.7 °C)  Temp Source: Oral  Pulse (Heart Rate): 60  Heart Rate Source: Monitor  Resp Rate: 16  BP: (!) 143/59  MAP (Monitor): 94  MAP (Calculated): 87  BP 1 Location: Right upper arm  BP 1 Method: Automatic  BP Patient Position: Sitting  MEWS Score: 1    Vitals:    07/11/22 0752 07/11/22 0812 07/11/22 1152 07/11/22 1530   BP: 124/69  114/88 (!) 143/59   Pulse: (!) 54 (!) 58 70 60   Resp: 18  18 16   Temp: 98.1 °F (36.7 °C)  98.3 °F (36.8 °C) 98 °F (36.7 °C)   SpO2: 100%  95% 100%      Vitals:    07/11/22 0752 07/11/22 0812 07/11/22 1152 07/11/22 1530   BP: 124/69  114/88 (!) 143/59   Pulse: (!) 54 (!) 58 70 60   Resp: 18  18 16   Temp: 98.1 °F (36.7 °C)  98.3 °F (36.8 °C) 98 °F (36.7 °C)   SpO2: 100%  95% 100%               Activity Level: Activity Level: Up with Assistance   Current Immunizations:  Immunization History   Administered Date(s) Administered    Pneumococcal Vaccine (Unspecified Type) 05/01/2014      Recommendations:     IV Antibiotics    Barriers: limited assistance at home   COVID status: No active infections     Will the patient require COVID testing prior to discharge for placement?: YES    Plan: SNF    Interdisciplinary team rounds were held with the following team members MD, Bedside RN, care manager. Plan of care discussed. See clinical pathway and/or care plan for interventions and desired outcomes. Care Management Interventions  Mode of Transport at Discharge:  Other (see comment) (Family)  Transition of Care Consult (CM Consult): Discharge [address]: No  Health Maintenance Reviewed: Yes  Physical Therapy Consult: Yes  Occupational Therapy Consult: Yes  Support Systems: Spouse/Significant Other  The Plan for Transition of Care is Related to the Following Treatment Goals : SNF  The Patient and/or Patient Representative was Provided with a Choice of Provider and Agrees with the Discharge Plan?: Yes  Name of the Patient Representative Who was Provided with a Choice of Provider and Agrees with the Discharge Plan: pt  Freedom of Choice List was Provided with Basic Dialogue that Supports the Patient's Individualized Plan of Care/Goals, Treatment Preferences and Shares the Quality Data Associated with the Providers?: Yes  Discharge Location  Patient Expects to be Discharged to[de-identified] Skilled nursing facility

## 2022-07-12 VITALS
RESPIRATION RATE: 18 BRPM | OXYGEN SATURATION: 100 % | BODY MASS INDEX: 34.31 KG/M2 | HEIGHT: 64 IN | HEART RATE: 55 BPM | TEMPERATURE: 98.4 F | DIASTOLIC BLOOD PRESSURE: 65 MMHG | SYSTOLIC BLOOD PRESSURE: 116 MMHG | WEIGHT: 201 LBS

## 2022-07-12 PROBLEM — J96.10 CHRONIC RESPIRATORY FAILURE (HCC): Status: ACTIVE | Noted: 2022-07-12

## 2022-07-12 LAB
GLUCOSE BLD STRIP.AUTO-MCNC: 159 MG/DL (ref 70–110)
GLUCOSE BLD STRIP.AUTO-MCNC: 235 MG/DL (ref 70–110)

## 2022-07-12 PROCEDURE — G0378 HOSPITAL OBSERVATION PER HR: HCPCS

## 2022-07-12 PROCEDURE — 74011636637 HC RX REV CODE- 636/637: Performed by: FAMILY MEDICINE

## 2022-07-12 PROCEDURE — 94640 AIRWAY INHALATION TREATMENT: CPT

## 2022-07-12 PROCEDURE — 74011000250 HC RX REV CODE- 250: Performed by: HOSPITALIST

## 2022-07-12 PROCEDURE — 74011250637 HC RX REV CODE- 250/637: Performed by: HOSPITALIST

## 2022-07-12 PROCEDURE — 74011636637 HC RX REV CODE- 636/637: Performed by: HOSPITALIST

## 2022-07-12 PROCEDURE — 74011250637 HC RX REV CODE- 250/637: Performed by: FAMILY MEDICINE

## 2022-07-12 PROCEDURE — 82962 GLUCOSE BLOOD TEST: CPT

## 2022-07-12 RX ORDER — PREDNISONE 10 MG/1
TABLET ORAL
Qty: 21 TABLET | Refills: 0 | Status: ON HOLD | OUTPATIENT
Start: 2022-07-12 | End: 2022-08-08

## 2022-07-12 RX ADMIN — INSULIN LISPRO 10 UNITS: 100 INJECTION, SOLUTION INTRAVENOUS; SUBCUTANEOUS at 13:12

## 2022-07-12 RX ADMIN — INSULIN LISPRO 4 UNITS: 100 INJECTION, SOLUTION INTRAVENOUS; SUBCUTANEOUS at 13:12

## 2022-07-12 RX ADMIN — PREDNISONE 50 MG: 20 TABLET ORAL at 08:46

## 2022-07-12 RX ADMIN — LEVETIRACETAM 500 MG: 500 TABLET, FILM COATED ORAL at 08:46

## 2022-07-12 RX ADMIN — BUDESONIDE 500 MCG: 0.5 INHALANT RESPIRATORY (INHALATION) at 07:11

## 2022-07-12 RX ADMIN — LOSARTAN POTASSIUM 50 MG: 50 TABLET, FILM COATED ORAL at 08:46

## 2022-07-12 RX ADMIN — AMLODIPINE BESYLATE 5 MG: 5 TABLET ORAL at 08:47

## 2022-07-12 RX ADMIN — ATENOLOL 100 MG: 50 TABLET ORAL at 08:47

## 2022-07-12 RX ADMIN — Medication 400 MG: at 08:46

## 2022-07-12 RX ADMIN — ARFORMOTEROL TARTRATE 15 MCG: 15 SOLUTION RESPIRATORY (INHALATION) at 07:11

## 2022-07-12 RX ADMIN — FAMOTIDINE 20 MG: 20 TABLET, FILM COATED ORAL at 08:47

## 2022-07-12 RX ADMIN — INSULIN LISPRO 2 UNITS: 100 INJECTION, SOLUTION INTRAVENOUS; SUBCUTANEOUS at 08:47

## 2022-07-12 RX ADMIN — GABAPENTIN 300 MG: 300 CAPSULE ORAL at 08:46

## 2022-07-12 RX ADMIN — INSULIN GLARGINE 33 UNITS: 100 INJECTION, SOLUTION SUBCUTANEOUS at 08:47

## 2022-07-12 RX ADMIN — INSULIN LISPRO 10 UNITS: 100 INJECTION, SOLUTION INTRAVENOUS; SUBCUTANEOUS at 08:47

## 2022-07-12 RX ADMIN — ASPIRIN 81 MG: 81 TABLET, CHEWABLE ORAL at 08:46

## 2022-07-12 NOTE — PROGRESS NOTES
D/C Plan: [address] with home O2 and physician follow up    CM met with pt at bedside to discuss care transition. Pt states she now would like to return home with [facility]. Her  will be staying home with her while she recuperates. Pt has been notified [facility] is not able to assist upon discharge. CM offered FOC and pt is agreeable to [facility]. CMS has been notified to assist.  CM assisted with home O2. Anticipate pt will transition home with the above services. Pt's  will transport pt home upon discharge. No other care transition needs have been identified. Care Management Interventions  Mode of Transport at Discharge:  Other (see comment) (Family)  Transition of Care Consult (CM Consult): Discharge [address]: No  Health Maintenance Reviewed: Yes  Physical Therapy Consult: Yes  Occupational Therapy Consult: Yes  Support Systems: Spouse/Significant Other  The Plan for Transition of Care is Related to the Following Treatment Goals : SNF  The Patient and/or Patient Representative was Provided with a Choice of Provider and Agrees with the Discharge Plan?: Yes  Name of the Patient Representative Who was Provided with a Choice of Provider and Agrees with the Discharge Plan: pt  Freedom of Choice List was Provided with Basic Dialogue that Supports the Patient's Individualized Plan of Care/Goals, Treatment Preferences and Shares the Quality Data Associated with the Providers?: Yes  Discharge Location  Patient Expects to be Discharged to[de-identified] Home with home health

## 2022-07-12 NOTE — DISCHARGE SUMMARY
Discharge Summary    Patient: Elizabeth Hawk MRN: [id number]  CSN: [id number]    YOB: 1945  Age: 77 y.o.   Sex: female    DOA: 7/7/2022 LOS:  LOS: 5 days   Discharge Date:      Primary Care Provider:  Ewa Roberson MD    Admission Diagnoses: COPD with acute exacerbation ([facility] [J44.1]  Hypoxemia [R09.02]  Neuropathic pain, leg [M79.2]    Discharge Diagnoses:    Problem List as of 7/12/2022 Date Reviewed: 2/22/2017          Codes Class Noted - Resolved    Chronic respiratory failure ([facility] ICD-10-CM: J96.10  ICD-9-CM: 518.83  7/12/2022 - Present        * (Principal) COPD with acute exacerbation ([facility] ICD-10-CM: J44.1  ICD-9-CM: 491.21  7/7/2022 - Present        Neuropathic pain, leg ICD-10-CM: M79.2  ICD-9-CM: 355.8  7/7/2022 - Present        Seizures ([facility] ICD-10-CM: R56.9  ICD-9-CM: 780.39  Unknown - Present        Diabetes ([facility] ICD-10-CM: E11.9  ICD-9-CM: 250.00  Unknown - Present        Bilateral leg pain ICD-10-CM: M79.604, M79.605  ICD-9-CM: 729.5  8/12/2021 - Present        SOB (shortness of breath) ICD-10-CM: R06.02  ICD-9-CM: 786.05  8/12/2021 - Present        CVA (cerebral vascular accident) [facility]) ICD-10-CM: I63.9  ICD-9-CM: 434.91  7/27/2018 - Present        COPD (chronic obstructive pulmonary disease) ([facility] (Chronic) ICD-10-CM: J44.9  ICD-9-CM: 501  2/22/2017 - Present        HTN (hypertension), benign ICD-10-CM: I10  ICD-9-CM: 401.1  Unknown - Present        Neuropathy ICD-10-CM: G62.9  ICD-9-CM: 355.9  Unknown - Present        Hyperlipidemia ICD-10-CM: E78.5  ICD-9-CM: 272.4  Unknown - Present              Discharge Medications:     Current Discharge Medication List      START taking these medications    Details   predniSONE (STERAPRED DS) 10 mg dose pack See administration instruction per 10mg dose pack  Qty: 21 Tablet, Refills: 0  Start date: 7/12/2022         CONTINUE these medications which have NOT CHANGED    Details   rivaroxaban (Xarelto DVT-PE Treat 30d Start) 15 mg (42)- 20 mg (9) DsPk Take one 15 mg tablet twice a day with food for the first 21 days. Then, take one 20 mg tablet once a day with food for 9 days. Qty: 1 Dose Pack, Refills: 0      albuterol (PROVENTIL HFA, VENTOLIN HFA, PROAIR HFA) 90 mcg/actuation inhaler Take 1 Puff by inhalation every four (4) hours as needed for Wheezing. Qty: 1 Inhaler, Refills: 0      famotidine (PEPCID) 20 mg tablet Take 20 mg by mouth two (2) times a day. levETIRAcetam (Keppra) 500 mg tablet Take 500 mg by mouth two (2) times a day. fluticasone-umeclidin-vilanter (Trelegy Ellipta) 200-62.5-25 mcg dsdv Take  by inhalation. amLODIPine (NORVASC) 5 mg tablet Take  by mouth.      magnesium oxide (MAG-OX) 400 mg tablet Take 400 mg by mouth.      metFORMIN (GLUCOPHAGE) 850 mg tablet Take 1 Tab by mouth three (3) times daily. Qty: 30 Tab, Refills: 0      atorvastatin (LIPITOR) 40 mg tablet Take 1 Tab by mouth nightly. Qty: 30 Tab, Refills: 0      atenolol (TENORMIN) 100 mg tablet Take 200 mg by mouth daily. insulin glargine (LANTUS) 100 unit/mL injection 30 Units by SubCUTAneous route daily. aspirin 81 mg chewable tablet Take 1 Tab by mouth daily. Qty: 30 Tab, Refills: 0      gabapentin (NEURONTIN) 300 mg capsule Take 600 mg by mouth three (3) times daily. STOP taking these medications       budesonide-formoterol (SYMBICORT) 160-4.5 mcg/actuation HFAA Comments:   Reason for Stopping:               Discharge Condition: Good    Procedures : None    Consults: None      PHYSICAL EXAM   Visit Vitals  /65 (BP 1 Location: Right upper arm, BP Patient Position: At rest)   Pulse (!) 55   Temp 98.4 °F (36.9 °C)   Resp 18   Ht 5' 4\" (1.626 m)   Wt 91.2 kg (201 lb)   SpO2 100%   BMI 34.50 kg/m²     General: Awake, cooperative, no acute distress    HEENT: NC, Atraumatic. PERRLA, EOMI. Anicteric sclerae. Lungs:  CTA Bilaterally. No Wheezing/Rhonchi/Rales.   Heart:  Regular  rhythm,  No murmur, No Rubs, No Gallops  Abdomen: Soft, Non distended, Non tender. +Bowel sounds,   Extremities: No c/c/e  Psych:   Not anxious or agitated. Neurologic:  No acute neurological deficits. Admission HPI :   Chantelle Hankins is a 77 y.o. female with diabetes, hypertension, COPD,  history of stroke, seizures who presents to ER with concerns of chest pain, shortness of breath and bilateral leg pain. Patient reports that she has been having chest pain and some shortness of breath for the past 3 days. Chest pain located on the left side of her chest, aching pain. She has also been feeling short of breath. She uses oxygen at home. She also complains of bilateral lower extremity neuropathy pain. She reports that she had fever yesterday and it was 100.6. Denies any chills, nausea, vomiting, headache. In ER her vital signs in normal range except for her oxygen in high 80s. She was placed on 2 L of oxygen with improvement in her oxygenation. , her lab relatively normal range. Her CTA chest negative for PE or any acute findings. Her high-sensitivity troponin negative x3. Normal NT proBNP.       Hospital Course :   Ms. Leigha Gustafson was admitted to medical floor. Chronic respiratory failure -  Secondary to COPD  She failed walk test.   Home oxygen arranged. COPD exacerbation -  Started on solumedrol, [place], [address] treatment as needed. Empiric antibiotics ceftriaxone, azithromycin. CTA chest with No evidence of pulmonary embolism or acute intrathoracic abnormality. Her symptoms improved, will discharge on prednisone taper dose, she will continue with home inhaler. Follow up with pulmonary. Case management has made appointment with pulmonary.     DM -  Continued with lantus  Started on SSI, ADA diet     HTN -  Continued home meds,  Monitored BP     Seizures -  Continued with keppra     Neuropathy -  Continued with Neurontin.     Activity: Activity as tolerated    Diet: Diabetic Diet    Follow-up: PCP, pulmonary    Disposition: home with home health    Minutes spent on discharge: 45       Labs: Results:       Chemistry Recent Labs     07/10/22  0446   *      K 4.6      CO2 27   BUN 20*   CREA 0.87   CA 8.7   AGAP 4   BUCR 23*      CBC w/Diff Recent Labs     07/10/22  0446   WBC 9.4   RBC 4.51   HGB 10.6*   HCT 35.2      GRANS 73   LYMPH 20*   EOS 0      Cardiac Enzymes No results for input(s): CPK, CKND1, MAGGIE in the last 72 hours. No lab exists for component: CKRMB, TROIP   Coagulation No results for input(s): PTP, INR, APTT, INREXT, INREXT in the last 72 hours. Lipid Panel Lab Results   Component Value Date/Time    Cholesterol, total 127 07/28/2018 06:06 AM    HDL Cholesterol 35 (L) 07/28/2018 06:06 AM    LDL, calculated 61.6 07/28/2018 06:06 AM    VLDL, calculated 30.4 07/28/2018 06:06 AM    Triglyceride 152 (H) 07/28/2018 06:06 AM    CHOL/HDL Ratio 3.6 07/28/2018 06:06 AM      BNP No results for input(s): BNPP in the last 72 hours. Liver Enzymes No results for input(s): TP, ALB, TBIL, AP in the last 72 hours. No lab exists for component: SGOT, GPT, DBIL   Thyroid Studies Lab Results   Component Value Date/Time    TSH 5.60 (H) 02/25/2019 05:50 AM            Significant Diagnostic Studies: CTA CHEST W OR W WO CONT    Result Date: 7/7/2022  EXAM: CTA Chest INDICATION: Shortness of breath. Possible PE. COMPARISON: 4/7/2022 TECHNIQUE: Axial CT imaging from the thoracic inlet through the diaphragm with intravenous contrast utilizing CTA study for pulmonary artery evaluation. Coronal and sagittal MIP reformations were generated at a separate workstation. One or more dose reduction techniques were used on this CT: automated exposure control, adjustment of the mAs and/or kVp according to patient size, and iterative reconstruction techniques. The specific techniques used on this CT exam have been documented in the patient's electronic medical record.   Digital Imaging and Communications in Medicine (DICOM) format image data are available to nonaffiliated external healthcare facilities or entities on a secure, media free, reciprocally searchable basis with patient authorization for at least a 12-month period after this study. _______________ FINDINGS: EXAM QUALITY: Overall exam quality is adequate. Pulmonary arterial enhancement is adequate. The breath hold is satisfactory. PULMONARY ARTERIES: No convincing evidence of pulmonary embolism. MEDIASTINUM: Normal heart size. No evidence of right heart strain. Normal caliber aorta with vascular calcifications. No pericardial effusion. LYMPH NODES: No pathologically enlarged mediastinal nodes. Partially calcified nonenlarged mediastinal nodes. AIRWAY: Unremarkable. LUNGS: Bibasilar atelectasis. No suspicious nodule or mass. No consolidation. PLEURA: No pleural effusion or pneumothorax. UPPER ABDOMEN: Small hiatal hernia. Hepatic steatosis. OTHER: No acute or aggressive osseous abnormalities identified. _______________     No evidence of pulmonary embolism or acute intrathoracic abnormality. Small hiatal hernia. Hepatic steatosis. XR CHEST PORT    Result Date: 7/7/2022  EXAM: XR CHEST PORT CLINICAL INDICATION/HISTORY: sob -Additional: None COMPARISON: 4/7/2022 TECHNIQUE: Frontal view of the chest _______________ FINDINGS: HEART AND MEDIASTINUM: Normal cardiac size and mediastinal contours. LUNGS AND PLEURAL SPACES: No focal pneumonic consolidation, pneumothorax, or pleural effusion. BONY THORAX AND SOFT TISSUES: No acute osseous abnormality _______________     No acute findings in the chest.     DUPLEX LOWER EXT VENOUS BILAT    Result Date: 7/7/2022  · No evidence of deep vein thrombosis in the right lower extremity. · No evidence of deep vein thrombosis in the left lower extremity. No results found for this or any previous visit.        Please note that this dictation was completed with [employer], the [employer] voice recognition software. Quite often unanticipated grammatical, syntax, homophones, and other interpretive errors are inadvertently transcribed by the computer software. Please disregard these errors. Please excuse any errors that have escaped final proofreading.      CC: Walt Calvillo MD

## 2022-07-12 NOTE — DISCHARGE SUMMARY
Discharge Summary    Patient: Charis Moreno MRN: [id number]  CSN: [id number]    YOB: 1945  Age: 77 y.o.   Sex: female    DOA: 7/7/2022 LOS:  LOS: 5 days   Discharge Date:      Primary Care Provider:  Scotty Oliver MD    Admission Diagnoses: COPD with acute exacerbation ([facility] [J44.1]  Hypoxemia [R09.02]  Neuropathic pain, leg [M79.2]    Discharge Diagnoses:    Problem List as of 7/12/2022 Date Reviewed: 2/22/2017          Codes Class Noted - Resolved    Chronic respiratory failure ([facility] ICD-10-CM: J96.10  ICD-9-CM: 518.83  7/12/2022 - Present        * (Principal) COPD with acute exacerbation ([facility] ICD-10-CM: J44.1  ICD-9-CM: 491.21  7/7/2022 - Present        Neuropathic pain, leg ICD-10-CM: M79.2  ICD-9-CM: 355.8  7/7/2022 - Present        Seizures ([facility] ICD-10-CM: R56.9  ICD-9-CM: 780.39  Unknown - Present        Diabetes ([facility] ICD-10-CM: E11.9  ICD-9-CM: 250.00  Unknown - Present        Bilateral leg pain ICD-10-CM: M79.604, M79.605  ICD-9-CM: 729.5  8/12/2021 - Present        SOB (shortness of breath) ICD-10-CM: R06.02  ICD-9-CM: 786.05  8/12/2021 - Present        CVA (cerebral vascular accident) [facility]) ICD-10-CM: I63.9  ICD-9-CM: 434.91  7/27/2018 - Present        COPD (chronic obstructive pulmonary disease) ([facility] (Chronic) ICD-10-CM: J44.9  ICD-9-CM: 857  2/22/2017 - Present        HTN (hypertension), benign ICD-10-CM: I10  ICD-9-CM: 401.1  Unknown - Present        Neuropathy ICD-10-CM: G62.9  ICD-9-CM: 355.9  Unknown - Present        Hyperlipidemia ICD-10-CM: E78.5  ICD-9-CM: 272.4  Unknown - Present              Discharge Medications:     Current Discharge Medication List      START taking these medications    Details   predniSONE (STERAPRED DS) 10 mg dose pack See administration instruction per 10mg dose pack  Qty: 21 Tablet, Refills: 0  Start date: 7/12/2022         CONTINUE these medications which have NOT CHANGED    Details   rivaroxaban (Xarelto DVT-PE Treat 30d Start) 15 mg (42)- 20 mg (9) DsPk Take one 15 mg tablet twice a day with food for the first 21 days. Then, take one 20 mg tablet once a day with food for 9 days. Qty: 1 Dose Pack, Refills: 0      albuterol (PROVENTIL HFA, VENTOLIN HFA, PROAIR HFA) 90 mcg/actuation inhaler Take 1 Puff by inhalation every four (4) hours as needed for Wheezing. Qty: 1 Inhaler, Refills: 0      famotidine (PEPCID) 20 mg tablet Take 20 mg by mouth two (2) times a day. levETIRAcetam (Keppra) 500 mg tablet Take 500 mg by mouth two (2) times a day. fluticasone-umeclidin-vilanter (Trelegy Ellipta) 200-62.5-25 mcg dsdv Take  by inhalation. amLODIPine (NORVASC) 5 mg tablet Take  by mouth.      magnesium oxide (MAG-OX) 400 mg tablet Take 400 mg by mouth.      metFORMIN (GLUCOPHAGE) 850 mg tablet Take 1 Tab by mouth three (3) times daily. Qty: 30 Tab, Refills: 0      atorvastatin (LIPITOR) 40 mg tablet Take 1 Tab by mouth nightly. Qty: 30 Tab, Refills: 0      atenolol (TENORMIN) 100 mg tablet Take 200 mg by mouth daily. insulin glargine (LANTUS) 100 unit/mL injection 30 Units by SubCUTAneous route daily. aspirin 81 mg chewable tablet Take 1 Tab by mouth daily. Qty: 30 Tab, Refills: 0      gabapentin (NEURONTIN) 300 mg capsule Take 600 mg by mouth three (3) times daily. STOP taking these medications       budesonide-formoterol (SYMBICORT) 160-4.5 mcg/actuation HFAA Comments:   Reason for Stopping:               Discharge Condition: Good    Procedures : None    Consults: None      PHYSICAL EXAM   Visit Vitals  /65 (BP 1 Location: Right upper arm, BP Patient Position: At rest)   Pulse (!) 55   Temp 98.4 °F (36.9 °C)   Resp 18   Ht 5' 4\" (1.626 m)   Wt 91.2 kg (201 lb)   SpO2 100%   BMI 34.50 kg/m²     General: Awake, cooperative, no acute distress    HEENT: NC, Atraumatic. PERRLA, EOMI. Anicteric sclerae. Lungs:  CTA Bilaterally. No Wheezing/Rhonchi/Rales.   Heart:  Regular  rhythm,  No murmur, No Rubs, No Gallops  Abdomen: Soft, Non distended, Non tender. +Bowel sounds,   Extremities: No c/c/e  Psych:   Not anxious or agitated. Neurologic:  No acute neurological deficits. Admission HPI :   Guillaume Mayer is a 77 y.o. female with diabetes, hypertension, COPD,  history of stroke, seizures who presents to ER with concerns of chest pain, shortness of breath and bilateral leg pain. Patient reports that she has been having chest pain and some shortness of breath for the past 3 days. Chest pain located on the left side of her chest, aching pain. She has also been feeling short of breath. She uses oxygen at home. She also complains of bilateral lower extremity neuropathy pain. She reports that she had fever yesterday and it was 100.6. Denies any chills, nausea, vomiting, headache. In ER her vital signs in normal range except for her oxygen in high 80s. She was placed on 2 L of oxygen with improvement in her oxygenation. , her lab relatively normal range. Her CTA chest negative for PE or any acute findings. Her high-sensitivity troponin negative x3. Normal NT proBNP.       Hospital Course :   Ms. Shaq Robins was admitted to medical floor. Chronic respiratory failure -  Secondary to COPD  She failed walk test.   Home oxygen arranged. COPD exacerbation -  Started on solumedrol, [place], [address] treatment as needed. Empiric antibiotics ceftriaxone, azithromycin. CTA chest with No evidence of pulmonary embolism or acute intrathoracic abnormality. Her symptoms improved, will discharge on prednisone taper dose, she will continue with home inhaler. Follow up with pulmonary. Case management has made appointment with pulmonary.     DM -  Continued with lantus  Started on SSI, ADA diet     HTN -  Continued home meds,  Monitored BP     Seizures -  Continued with keppra     Neuropathy -  Continued with Neurontin.     Activity: Activity as tolerated    Diet: Diabetic Diet    Follow-up: PCP, pulmonary    Disposition: home with home health    Minutes spent on discharge: 45       Labs: Results:       Chemistry Recent Labs     07/10/22  0446   *      K 4.6      CO2 27   BUN 20*   CREA 0.87   CA 8.7   AGAP 4   BUCR 23*      CBC w/Diff Recent Labs     07/10/22  0446   WBC 9.4   RBC 4.51   HGB 10.6*   HCT 35.2      GRANS 73   LYMPH 20*   EOS 0      Cardiac Enzymes No results for input(s): CPK, CKND1, MAGGIE in the last 72 hours. No lab exists for component: CKRMB, TROIP   Coagulation No results for input(s): PTP, INR, APTT, INREXT, INREXT in the last 72 hours. Lipid Panel Lab Results   Component Value Date/Time    Cholesterol, total 127 07/28/2018 06:06 AM    HDL Cholesterol 35 (L) 07/28/2018 06:06 AM    LDL, calculated 61.6 07/28/2018 06:06 AM    VLDL, calculated 30.4 07/28/2018 06:06 AM    Triglyceride 152 (H) 07/28/2018 06:06 AM    CHOL/HDL Ratio 3.6 07/28/2018 06:06 AM      BNP No results for input(s): BNPP in the last 72 hours. Liver Enzymes No results for input(s): TP, ALB, TBIL, AP in the last 72 hours. No lab exists for component: SGOT, GPT, DBIL   Thyroid Studies Lab Results   Component Value Date/Time    TSH 5.60 (H) 02/25/2019 05:50 AM            Significant Diagnostic Studies: CTA CHEST W OR W WO CONT    Result Date: 7/7/2022  EXAM: CTA Chest INDICATION: Shortness of breath. Possible PE. COMPARISON: 4/7/2022 TECHNIQUE: Axial CT imaging from the thoracic inlet through the diaphragm with intravenous contrast utilizing CTA study for pulmonary artery evaluation. Coronal and sagittal MIP reformations were generated at a separate workstation. One or more dose reduction techniques were used on this CT: automated exposure control, adjustment of the mAs and/or kVp according to patient size, and iterative reconstruction techniques. The specific techniques used on this CT exam have been documented in the patient's electronic medical record.   Digital Imaging and Communications in Medicine (DICOM) format image data are available to nonaffiliated external healthcare facilities or entities on a secure, media free, reciprocally searchable basis with patient authorization for at least a 12-month period after this study. _______________ FINDINGS: EXAM QUALITY: Overall exam quality is adequate. Pulmonary arterial enhancement is adequate. The breath hold is satisfactory. PULMONARY ARTERIES: No convincing evidence of pulmonary embolism. MEDIASTINUM: Normal heart size. No evidence of right heart strain. Normal caliber aorta with vascular calcifications. No pericardial effusion. LYMPH NODES: No pathologically enlarged mediastinal nodes. Partially calcified nonenlarged mediastinal nodes. AIRWAY: Unremarkable. LUNGS: Bibasilar atelectasis. No suspicious nodule or mass. No consolidation. PLEURA: No pleural effusion or pneumothorax. UPPER ABDOMEN: Small hiatal hernia. Hepatic steatosis. OTHER: No acute or aggressive osseous abnormalities identified. _______________     No evidence of pulmonary embolism or acute intrathoracic abnormality. Small hiatal hernia. Hepatic steatosis. XR CHEST PORT    Result Date: 7/7/2022  EXAM: XR CHEST PORT CLINICAL INDICATION/HISTORY: sob -Additional: None COMPARISON: 4/7/2022 TECHNIQUE: Frontal view of the chest _______________ FINDINGS: HEART AND MEDIASTINUM: Normal cardiac size and mediastinal contours. LUNGS AND PLEURAL SPACES: No focal pneumonic consolidation, pneumothorax, or pleural effusion. BONY THORAX AND SOFT TISSUES: No acute osseous abnormality _______________     No acute findings in the chest.     DUPLEX LOWER EXT VENOUS BILAT    Result Date: 7/7/2022  · No evidence of deep vein thrombosis in the right lower extremity. · No evidence of deep vein thrombosis in the left lower extremity. No results found for this or any previous visit.        Please note that this dictation was completed with [employer], the [employer] voice recognition software. Quite often unanticipated grammatical, syntax, homophones, and other interpretive errors are inadvertently transcribed by the computer software. Please disregard these errors. Please excuse any errors that have escaped final proofreading.      CC: Tisha Gonzalez MD

## 2022-07-12 NOTE — PROGRESS NOTES
D/C Plan: [id number] with home O2 and physician follow up    CM met with pt at bedside to discuss care transition. Pt states she now would like to return home with [facility]. Her  will be staying home with her while she recuperates. Pt has been notified [facility] is not able to assist upon discharge. CM offered [facility] and pt is agreeable to [facility]. CMS has been notified to assist.  CM assisted with home O2. Anticipate pt will transition home with the above services. Pt's  will transport pt home upon discharge. No other care transition needs have been identified. Care Management Interventions  Mode of Transport at Discharge:  Other (see comment) (Family)  Transition of Care Consult (CM Consult): Discharge 97 Lesia Johnson: No  Health Maintenance Reviewed: Yes  Physical Therapy Consult: Yes  Occupational Therapy Consult: Yes  Support Systems: Spouse/Significant Other  The Plan for Transition of Care is Related to the Following Treatment Goals : SNF  The Patient and/or Patient Representative was Provided with a Choice of Provider and Agrees with the Discharge Plan?: Yes  Name of the Patient Representative Who was Provided with a Choice of Provider and Agrees with the Discharge Plan: pt  Freedom of Choice List was Provided with Basic Dialogue that Supports the Patient's Individualized Plan of Care/Goals, Treatment Preferences and Shares the Quality Data Associated with the Providers?: Yes  Discharge Location  Patient Expects to be Discharged to[de-identified] Home with home health

## 2022-07-13 ENCOUNTER — HOME HEALTH ADMISSION (OUTPATIENT)
Dept: HOME HEALTH SERVICES | Facility: HOME HEALTH | Age: 77
End: 2022-07-13
Payer: MEDICARE

## 2022-07-14 ENCOUNTER — HOME CARE VISIT (OUTPATIENT)
Dept: SCHEDULING | Facility: HOME HEALTH | Age: 77
End: 2022-07-14
Payer: MEDICARE

## 2022-07-14 PROCEDURE — G0299 HHS/HOSPICE OF RN EA 15 MIN: HCPCS

## 2022-07-14 PROCEDURE — 400013 HH SOC

## 2022-07-15 ENCOUNTER — HOME CARE VISIT (OUTPATIENT)
Dept: SCHEDULING | Facility: HOME HEALTH | Age: 77
End: 2022-07-15
Payer: MEDICARE

## 2022-07-15 VITALS
TEMPERATURE: 98.1 F | OXYGEN SATURATION: 93 % | HEART RATE: 73 BPM | DIASTOLIC BLOOD PRESSURE: 58 MMHG | RESPIRATION RATE: 20 BRPM | SYSTOLIC BLOOD PRESSURE: 146 MMHG

## 2022-07-15 VITALS
SYSTOLIC BLOOD PRESSURE: 120 MMHG | TEMPERATURE: 97.5 F | RESPIRATION RATE: 18 BRPM | HEART RATE: 77 BPM | OXYGEN SATURATION: 93 % | DIASTOLIC BLOOD PRESSURE: 80 MMHG

## 2022-07-15 LAB
ATRIAL RATE: 72 BPM
ATRIAL RATE: 82 BPM
CALCULATED P AXIS, ECG09: 64 DEGREES
CALCULATED P AXIS, ECG09: 66 DEGREES
CALCULATED R AXIS, ECG10: 30 DEGREES
CALCULATED R AXIS, ECG10: 75 DEGREES
CALCULATED T AXIS, ECG11: 59 DEGREES
CALCULATED T AXIS, ECG11: 65 DEGREES
DIAGNOSIS, 93000: NORMAL
DIAGNOSIS, 93000: NORMAL
P-R INTERVAL, ECG05: 172 MS
P-R INTERVAL, ECG05: 188 MS
Q-T INTERVAL, ECG07: 404 MS
Q-T INTERVAL, ECG07: 430 MS
QRS DURATION, ECG06: 70 MS
QRS DURATION, ECG06: 74 MS
QTC CALCULATION (BEZET), ECG08: 470 MS
QTC CALCULATION (BEZET), ECG08: 472 MS
VENTRICULAR RATE, ECG03: 72 BPM
VENTRICULAR RATE, ECG03: 82 BPM

## 2022-07-15 PROCEDURE — G0151 HHCP-SERV OF PT,EA 15 MIN: HCPCS

## 2022-07-15 NOTE — HOME HEALTH
Skilled services/Home bound verification:     Skilled Reason for admission/summary of clinical condition:  Admission HPI :Radha Neely is a 77 y.o. female with diabetes, hypertension, COPD,  history of stroke, seizures who presents to ER with concerns of chest pain, shortness of breath and bilateral leg pain. Patient reports that she has been having chest pain and some shortness of breath for the past 3 days. Chest pain located on the left side of her chest, aching pain. She has also been feeling short of breath. She uses oxygen at home. She also complains of bilateral lower extremity neuropathy pain. She reports that she had fever yesterday and it was 100.6. Denies any chills, nausea, vomiting, headache. In ER her vital signs in normal range except for her oxygen in high 80s. She was placed on 2 L of oxygen with improvement in her oxygenation. , her lab r elatively normal range. Her CTA chest negative for PE or any acute findings. Her high-sensitivity troponin negative x3. Normal NT proBNP. This patient is homebound for the following reasons Requires considerable and taxing effort to leave the home . Caregiver: spouse Jorge Mullen Caregiver assists with ADL's and IADL's. Medications reconciled and all medications are not available in the home this visit. Per patient, she will be going to MD on Monday to update the medications. During SN eval, medicine reconciliation was done as well and segregated all the meds that she is taking and not supposed to take. Home health supplies by type and quantity ordered/delivered this visit include: none    Patient education provided this visit to include: HEP, fall prevention, dc planning .       Patient level of understanding of education provided: patient was able to partially teach back HEP     Sharps Education Provided: Clinician instructed patient/CG on proper disposal of sharps: Containers should be made of hard plastic, be puncture-resistant and leakproof,     such as a laundry detergent or bleach bottle. When the container is ¾ full, it should be sealed with tape and labeled     DO NOT RECYCLE prior to discarding in the regular trash. Patient response to procedure performed:  Patient needed visual and verbal cues for HEP    Home exercise program/Homework provided: patient educated with HEP including seated hip flex, knee extension, hip abduction, hip adduction, ankle PF and DF and ball squeeze x 20 reps x 3 sets daily to improve MMT on BLE to facilitate with improved bed mobility, transfers and gait without AD. patient also educated with deep breathing exercises to be done daily x 10 reps x 3 sets to prevent SOB during activity. Patient educated with fall prevention technique by decluttering space, proper use of footwear    Pt/Caregiver instructed on plan of care and are agreeable to plan of care at this time. Physician Blas Bunn MD notified of patient admission to home health and plan of care including anticipated frequency of 1w1 2w3 for PT     Discharge planning discussed with patient and caregiver. Discharge planning as follows: dc to family with MD supervision when all goals are met . Pt/Caregiver did verbalize understanding of discharge planning. Next MD appointment 7/18/22 Patient/caregiver encouraged/instructed to keep appointment as lack of follow through with physician appointment could result in discontinuation of home care services for non-compliance.          PMHx:  HTN (hypertension), benign I10    Neuropathy G62.9    Hyperlipidemia E78.5    COPD (chronic obstructive pulmonary disease) ([facility]) J44.9    CVA (cerebral vascular accident) ([facility] I63.9    Bilateral leg pain M79.604, M79.605    SOB (shortness of breath) R06.02    Hypoxemia R09.02   COPD with acute exacerbation ([facility]) J44.1    Neuropathic pain, leg M79.2    Seizures ([facility]) R56.9    Diabetes ([facility]) E11.9   S: complained of weakness on BLE, impaired ability for transfers and gait and impaired endurance to activity   O:Patients Goals= Be able to go back to PLOF without shortness of breath   Wound/Incision: location, description, drainage: none   PLOF: Lives with spouse in a 1 level house,  prior to referral,s he was  independent with ADL's and IADL's and did not use any AD for gait and was not using O2  STRENGTH B hip flexor, extensor, hip abductors, adductors 3+/5, B knee flexors, extensors 3+/5  SIT TO STAND x 1 rep with supervision   FTSTS 28 seconds  TUG 25 seconds   BALANCE  Tinetti 16/28 high fall risk due to reduced strength on BLE, gait deviation and decreased efficiency of balance reactions. Patient demonstrated poor use of hip and ankle strategy during standing balance activity. Patient requires support from AD at all times for safety and stability. GAIT Patient was able to ambulate without x 30 feet with supervision using rollator with noted slow, unsteady gait, wide AFSHAN and forward stooped posture. Patient was educated with maintaining upright posture to prevent LOB and falls. BED MOBILITY Patient needed supervision with bed mobility   TRANSFERS Patient needed Min A using RW with verbal cues to and from bed, chair and toilet   A: PT evaluation completed POC established, Med rec done, HEP established  P:Home Safety eval/recommendations: Home health physical therapy initial evaluation performed. Patient demonstrates decreased strength, balance, and endurance which increases patient's overall fall risk and burden of care. Patient would benefit from home health physical therapy to improve balance, strength, and endurance which would decrease fall risk and allow patient to return to prior level of function once all functional goals or full rehab potential is met. patient educated with HEP including seated hip flex, knee extension, hip abduction, hip adduction, ankle PF and DF and ball squeeze x 20 reps x 3 sets daily to improve MMT on BLE to facilitate with improved bed mobility, transfers and gait with AD. patient also educated with deep breathing exercises to be done daily x 10 reps x 3 sets to prevent SOB during activity.  Patient educated with fall prevention technique by decluttering space, proper use of footwear

## 2022-07-15 NOTE — Clinical Note
Therapy Functional Score Assessment  Question   Score   Grooming  2   Upper Dressing 2   Lower Dressing 3     Bathing  5    Toilet Transfer  2   Transfer  2         Ambulation  3   Dyspnea                     2    Pain Interfering with activity 0  Est number therapy visits      7

## 2022-07-18 NOTE — HOME HEALTH
Skilled services/Home bound verification:     Skilled Reason for admission/summary of clinical condition:  COPD, diabetic patient requiring education and monitoring. Patient had xarelto samples, was told by PCP prior to hospitalization to continue blood thinner. Was DC'd by hospitalist. Patient was concerned about this, so PCP was called. Left VM for PCP. Also, glucometer was broken. Left message for PCP have not heard back . This patient is homebound for the following reasons Requires considerable and taxing effort to leave the home , Requires the assistance of 1 or more persons to leave the home  and Only leaves the home for medical reasons or [facility] services and are infrequent and of short duration for other reasons . Caregiver: spouse. Caregiver assists with  ADL's, house keeping, transportation, meal prep. .    Medications reconciled and all medications are available in the home this visit. The following education was provided regarding medications: All medications educated on  and reconciled. All medications prescribed are in the home and patient taking as prescribed. Common uses and side effects reviewed with patient    Medications  are effective at this time. High risk medication teaching regarding anticoagulants, hyperglycemic agents or opiod narcotics performed (specify)   xarelto: Instruct patient to report any unusual bruising or bleeding . Warn patient to report symptoms of thrombotic events (stroke) if transitioning to or from warfarin therapy . Side effects may include serious, life-threatening bleeding, syncope, pruritus, extremity pain, and muscle spasms . Advise patient who has received neuraxial anesthesia or spinal puncture to immediately report symptoms of epidural or spinal hematoma, such as back pain, tingling, numbness, muscle weakness, and stool or urine incontinence .  patient to report symptoms of neurological impairment .    Tell patient not to discontinue the drug unless directed by a physician . Advise patient that a missed dose should be taken as soon as possible on the same day. For the 15 mg twice daily schedule, 2 tablets may be taken together if necessary . Instruct patient with difficulty swallowing to mix 15 mg or 20 mg tablet with applesauce, consume immediately, and follow dose with food . Advise patient there are multiple significant drug-drug interactions for this drug and to consult healthcare professional prior to new drug use (including over-the-counter and herbal drugs) as bleeding risk may increase with certain drugs (eg, aspirin, NSAIDs, clopidogrel) . Lantus:  Advise patient to never share insulin pens due to risk for transmission of bloodborne pathogens . Side effects may include injection site reactions, lipodystrophy, pruritus, rash, edema, or weight gain . Advise patient to monitor for signs of hypoglycemia or hyperglycemia and to report difficulties in glycemic control .  patient to administer at the same time each day . Instruct patient to carefully check the insulin label before each use to avoid medication errors . Advise patient to never mix or dilute with any other insulin or solution . Teach patient proper technique and placement of injections . Metformin  Instruct patient to report symptoms of lactic acidosis . Warn premenopausal anovulatory woman that ovulation and subsequent pregnancy may occur . Side effects may include diarrhea, dyspepsia, flatulence, nausea, vomiting, headache, increased sweating, asthenia, or unpleasant metallic taste . Tell patient to take immediate-release tablets and solution with meals and extended-release tablets and suspension with the evening meal . Advise patient to avoid excessive amounts of alcohol . Dr. Karen Louie notified of any discrepancies/look a like medications/medication interactions  by message left, did not hear back. Needed to confirm dc of xarelto.      Home health supplies by type and quantity ordered/delivered this visit include: none this visit    Patient education provided this visit to include: [facility] orientation including patient rights/responsibilities. Fall prevention, pressure ulcer prevetion, diabetic foot care. All medications. Patient level of understanding of education provided: Patient states that she understands all education      Sharps Education Provided: Clinician instructed patient/CG on proper disposal of sharps: Containers should be made of hard plastic, be puncture-resistant and leakproof,   such as a laundry detergent or bleach bottle.  When the container is ¾ full, it should be sealed with tape and labeled   DO NOT RECYCLE prior to discarding in the regular trash.      Patient response to procedure performed:  Patient responded well to visit without any increase in pain or discomfort        Home exercise program/Homework provided: Patient will participate with PT/OT    Pt/Caregiver instructed on plan of care and are agreeable to plan of care at this time. Physician Dr. Churchill notified of patient admission to home health and plan of care including anticipated frequency of 1w1, 2w2, 1w3 and treatments/interventions/modalities of SN. Discharge planning discussed with patient and caregiver. Discharge planning as follows: Will d/c to home/self care when goals are met and patient is stable  . Pt/Caregiver did verbalize understanding of discharge planning. Next MD appointment 7/18/22 (date) with Dr. Zhao MICHELLE/NP/PA. Patient/caregiver encouraged/instructed to keep appointment as lack of follow through with physician appointment could result in discontinuation of home care services for non-compliance.

## 2022-07-19 ENCOUNTER — HOME CARE VISIT (OUTPATIENT)
Dept: SCHEDULING | Facility: HOME HEALTH | Age: 77
End: 2022-07-19
Payer: MEDICARE

## 2022-07-19 PROCEDURE — G0157 HHC PT ASSISTANT EA 15: HCPCS

## 2022-07-19 PROCEDURE — G0299 HHS/HOSPICE OF RN EA 15 MIN: HCPCS

## 2022-07-20 VITALS
HEART RATE: 71 BPM | SYSTOLIC BLOOD PRESSURE: 148 MMHG | RESPIRATION RATE: 18 BRPM | OXYGEN SATURATION: 95 % | TEMPERATURE: 98.1 F | DIASTOLIC BLOOD PRESSURE: 66 MMHG

## 2022-07-20 VITALS
HEART RATE: 64 BPM | OXYGEN SATURATION: 97 % | SYSTOLIC BLOOD PRESSURE: 134 MMHG | RESPIRATION RATE: 16 BRPM | TEMPERATURE: 99 F | DIASTOLIC BLOOD PRESSURE: 68 MMHG

## 2022-07-20 NOTE — HOME HEALTH
SUBJECTIVE: Pt reports no pain today, and that the doctor took her off the Metoprolol. Caregiver involvement: Pt's spouse present and assists w/ meals, medications, and transportation needs. Medications reconciled and all medications are available in the home this visit. The following education was provided regarding medications, medication interactions, and look a like medications: Continue medications as prescribed. Report changes to [facility] Staff. Medications  are effective at this time. Home health supplies by type and quantity ordered/delivered this visit include: None    PATIENT EDUCATION provided this visit: Keep a pocket calendar in her purse to keep up with appointments. Fall Prevention, Energy Conservation - slow down, pace activity, use Rollator for offloading and for seated rest breaks if needed. HEP compliance. Use of Rollator to their advantage when in the home and out for appointments - prevent carrying heavy oxygen tanks on shoulder - place in rollator seat basket. PATIENT/CG RESPONSE TO EDUCATION: Pt verbalized understanding. Provided updated copy and pt able to return demonstrate w/o issues. OBJECTIVES:  See Care Plan Interventions    ASSESSMENT/PATIENT RESPONSE TO TREATMENT/PROGRESS TOWARDS GOALS:  Pt presents today in no apparent distress and agreeable to therapy visit today. Note slight oxygen desaturation to 90% post 4 minutes of amb, w/ no observable dyspnea noted. O2 levels improved to 95% in <1 minutes indicating good pulmonary endurance and recovery. Note LE  fatigued today w/ repetitive/functional sit to stand for LE strengthening w/o oxygen desaturation, HR w/I normal limit, yet patient requiring increased time for seated recovery. Pt has assistance in the home from spouse for ADLs, however has desire to restore her (I) and return shopping in community, but poor activity tolerance due to COPD diagnoses limits this (I).   Recommending continued use of Rollator AD in the home and community for MD appts at this time. Continued need for the following skills: HH Physical Therapy    PLAN: Review Progress towards Tinetti/TUG tests, and progression of HEP next visit. The following discharge planning was discussed with the pt/caregiver: HHPT frequency 1w1, 2w3 w/ planned DC on 8/4 to self, family, and under MD supervision when goals met, or max benefits achieved. - pt/cg verbalized understanding.

## 2022-07-22 ENCOUNTER — HOME CARE VISIT (OUTPATIENT)
Dept: SCHEDULING | Facility: HOME HEALTH | Age: 77
End: 2022-07-22
Payer: MEDICARE

## 2022-07-22 ENCOUNTER — HOME CARE VISIT (OUTPATIENT)
Dept: HOME HEALTH SERVICES | Facility: HOME HEALTH | Age: 77
End: 2022-07-22
Payer: MEDICARE

## 2022-07-22 PROCEDURE — G0157 HHC PT ASSISTANT EA 15: HCPCS

## 2022-07-22 PROCEDURE — G0299 HHS/HOSPICE OF RN EA 15 MIN: HCPCS

## 2022-07-22 NOTE — HOME HEALTH
Skilled reason for visit: COPD, SN assessment, education    Caregiver involvement:  ADL's, house keeping, transportation, meal prep. .    Medications reviewed and all medications are available in the home this visit. The following education was provided regarding medications:    Lantus:  Advise patient to never share insulin pens due to risk for transmission of bloodborne pathogens . Side effects may include injection site reactions, lipodystrophy, pruritus, rash, edema, or weight gain . Advise patient to monitor for signs of hypoglycemia or hyperglycemia and to report difficulties in glycemic control .  patient to administer at the same time each day . Instruct patient to carefully check the insulin label before each use to avoid medication errors . Advise patient to never mix or dilute with any other insulin or solution . Teach patient proper technique and placement of injections . .    MD notified of any discrepancies/look a-like medications/medication interactions: none  Medications are effective at this time.       Home health supplies by type and quantity ordered/delivered this visit include: none this visit    Patient education provided this visit: Incenetive spirometer, cough to clear lungs    Sharps education provided: Clinician instructed patient/CG on proper disposal of sharps: Containers should be made of hard plastic, be puncture-resistant and leakproof,   such as a laundry detergent or bleach bottle.  When the container is ¾ full, it should be sealed with tape and labeled   DO NOT RECYCLE prior to discarding in the regular trash.        Patient level of understanding of education provided: Patient states that she understands all education      Skilled Care Performed this visit: SN assessment, educatoin    Patient response to procedure performed:  Patient responded well to visit without any increase in pain or discomfort        Agency Progress toward goals: progressing    Patient's Progress towards personal goals: progressing    Home exercise program: performs on own    Continued need for the following skills: Nursing    Plan for next visit: SN assessment, education      Patient and/or caregiver notified and agrees to changes in the Plan of Care YES/NO/NA: YES      The following discharge planning was discussed with the pt/caregiver:  Will d/c to home/self care when goals are met and patient is stable

## 2022-07-25 ENCOUNTER — HOME CARE VISIT (OUTPATIENT)
Dept: SCHEDULING | Facility: HOME HEALTH | Age: 77
End: 2022-07-25
Payer: MEDICARE

## 2022-07-25 VITALS
DIASTOLIC BLOOD PRESSURE: 64 MMHG | RESPIRATION RATE: 18 BRPM | OXYGEN SATURATION: 95 % | TEMPERATURE: 97.3 F | SYSTOLIC BLOOD PRESSURE: 138 MMHG | HEART RATE: 64 BPM

## 2022-07-25 VITALS
HEART RATE: 60 BPM | RESPIRATION RATE: 20 BRPM | OXYGEN SATURATION: 98 % | DIASTOLIC BLOOD PRESSURE: 70 MMHG | TEMPERATURE: 98 F | SYSTOLIC BLOOD PRESSURE: 138 MMHG

## 2022-07-25 PROCEDURE — G0299 HHS/HOSPICE OF RN EA 15 MIN: HCPCS

## 2022-07-25 NOTE — HOME HEALTH
Skilled reason for visit: COPD, SN assessment, education   Caregiver involvement: ADL's, house keeping, transportation, meal prep. .   Medications reviewed and all medications are available in the home this visit. The following education was provided regarding medications:     Keppra: Instruct patient to report new or worsening depression, suicidal thoughts or behavior, psychotic symptoms, or unusual changes in mood or behavior (eg, aggression, agitation, anger, anxiety, or apathy) . Advise patient to avoid activities requiring mental alertness or coordination until drug effects are realized, as drug may cause dizziness, somnolence, and coordination difficulties . Side effects may include asthenia, irritability, headache, nasal congestion, nasopharyngitis, decreased appetite, and infection . Warn patient to report symptoms of serious skin reactions, such as toxic epidermal necrolysis or Samayoa-Humberto syndrome . Spritam(R): Tell patient that tablets may be dissolved in the mouth or suspended in water. Tablets should not be pushed through the foil covering on the blister pack . Advise patient against sudden discontinuation of drug, as this may increase seizure frequency . .   MD notified of any discrepancies/look a-like medications/medication interactions: none   Medications are effective at this time. Home health supplies by type and quantity ordered/delivered this visit include: none this visit   Patient education provided this visit: Incenetive spirometer, cough to clear lungs   Sharps education provided: Clinician instructed patient/CG on proper disposal of sharps: Containers should be made of hard plastic, be puncture-resistant and leakproof,   such as a laundry detergent or bleach bottle. When the container is ¾ full, it should be sealed with tape and labeled   DO NOT RECYCLE prior to discarding in the regular trash.    Patient level of understanding of education provided: Patient states that she understands all education   Skilled Care Performed this visit: SN assessment, educatoin   Patient response to procedure performed: Patient responded well to visit without any increase in pain or discomfort   Agency Progress toward goals: progressing   Patient's Progress towards personal goals: progressing   Home exercise program: performs on own   Continued need for the following skills: Nursing   Plan for next visit: SN assessment, education   Patient and/or caregiver notified and agrees to changes in the Plan of Care YES/NO/NA: YES   The following discharge planning was discussed with the pt/caregiver:  Will d/c to home/self care when goals are met and patient is stable

## 2022-07-25 NOTE — CASE COMMUNICATION
Medication reconciliation findings for this visit are as follows: Medications reviewed changes as follows: ADDED Metoprolol SUCC ER 50 MG Tab; TAKE 1 TABLET BY MOUTH ONCE DAILY SRTART 7/19/2022. ADDED: PANTOPRAZOLE SOD DR 20 MG TAB: TAKE 1 TABLET BY MOUTH EVERY MORNING. START: 7/21/2022. Follow up actions taken Care Team Notified. See medication list for details.

## 2022-07-25 NOTE — HOME HEALTH
SUBJECTIVE: \"I just felt tired and tightness in my chest this morning. I've been getting short of breath a lot. They put me on a new medication for my stomach. I get short of breath, and sometimes get heart flutters. My feet have been swelling, but they've gone down now. I just feel lousy. \"    Caregiver involvement: Pt's spouse present and assists w/ meals, medications, and transportation needs. He will be returning back to work next week. Medications reconciled and all medications are available in the home this visit. The following education was provided regarding medications, medication interactions, and look a like medications: Continue medications as prescribed. Report changes to [facility] Staff. Medications  are effective at this time. Home health supplies by type and quantity ordered/delivered this visit include: None      PATIENT EDUCATION provided this visit: Shower safety when someone is present. Go to ED if pt continues to have chest flutters, chest pain, dyspnea, sweating, and swelling increase, go to ED immediately. PATIENT/CG RESPONSE TO EDUCATION:Pt stated spouse will assist w/ showers in the evening. OBJECTIVES:  See Care Plan Interventions      ASSESSMENT/PATIENT RESPONSE TO TREATMENT/PROGRESS TOWARDS GOALS:  Pt presents today in no apparent distress, however has verbalized not sleeping well at night, waking up in cold sweats and short of breath. She is utilizing pillows to elevate her head, and keeping her home air conditioned. Pt is taking Prednisone starter pack for 21 days, however has taken this medication before. She was recently placed back on Metoprolol and Pantoprazole, and has verbalized increased fatigue during the day. She is progressing towards functional goals as follows: Tinetti: 25/28 indicates low fall risk compared to 16/28, and TUG test in 17 seconds compared to 25 seconds at evaluation. Vitals remained w/ normal parameters during interventions. She is (I) w/ transfers. Spouse will be returning to work next Monday and pt will be home alone, however will have meals prepped ahead of time (able to microwave), and have spouse assist w/ showers in the evening when he returns home. Unable to progress there ex today d/t fatigue and dyspnea. Recommending pt contact her PCP if cardiac/pulmonary symptoms progress or remain consistent. Continued need for the following skills: HH Physical Therapy    PLAN: Progress HEP next visit, stair training and functional endurance training/ambulation next visit. Monitor all vitals d/t reports of chest pain/dyspnea. The following discharge planning was discussed with the pt/caregiver: HHPT frequency 1w1, 2w3 w/ planned DC on 8/4 to self, family, and under MD supervision when goals met, or max benefits achieved. - pt/cg verbalized understanding.

## 2022-07-26 ENCOUNTER — HOME CARE VISIT (OUTPATIENT)
Dept: HOME HEALTH SERVICES | Facility: HOME HEALTH | Age: 77
End: 2022-07-26
Payer: MEDICARE

## 2022-07-26 ENCOUNTER — HOME CARE VISIT (OUTPATIENT)
Dept: SCHEDULING | Facility: HOME HEALTH | Age: 77
End: 2022-07-26
Payer: MEDICARE

## 2022-07-26 PROCEDURE — G0157 HHC PT ASSISTANT EA 15: HCPCS

## 2022-07-27 VITALS
HEART RATE: 78 BPM | TEMPERATURE: 97 F | OXYGEN SATURATION: 93 % | SYSTOLIC BLOOD PRESSURE: 146 MMHG | RESPIRATION RATE: 18 BRPM | DIASTOLIC BLOOD PRESSURE: 60 MMHG

## 2022-07-27 NOTE — HOME HEALTH
Skilled reason for visit: SN assessment, education on infection prevention and anxiety reduction/rold anxiety has with breathing      Caregiver involvement:  ADL's, house keeping, transportation, meal prep. .    Medications reviewed and all medications are available in the home this visit. The following education was provided regarding medications:     Metformin:  Instruct patient to report symptoms of lactic acidosis . Warn premenopausal anovulatory woman that ovulation and subsequent pregnancy may occur . Side effects may include diarrhea, dyspepsia, flatulence, nausea, vomiting, headache, increased sweating, asthenia, or unpleasant metallic taste . Tell patient to take immediate-release tablets and solution with meals and extended-release tablets and suspension with the evening meal . Advise patient to avoid excessive amounts of alcohol . MD notified of any discrepancies/look a-like medications/medication interactions: none  Medications are effective at this time.       Home health supplies by type and quantity ordered/delivered this visit include: none this visit    Patient education provided this visit: infection prevention and anxiety role on breathing    Sharps education provided: Clinician instructed patient/CG on proper disposal of sharps: Containers should be made of hard plastic, be puncture-resistant and leakproof,   such as a laundry detergent or bleach bottle.  When the container is ¾ full, it should be sealed with tape and labeled   DO NOT RECYCLE prior to discarding in the regular trash.        Patient level of understanding of education provided: Patient states that she understands all education      Skilled Care Performed this visit: SN assessment, education      Patient response to procedure performed:  Patient responded well to visit without any increase in pain or discomfort        Agency Progress toward goals: progressing, showing improvement in strength and lung sounds    Patient's Progress towards personal goals: progressing    Home exercise program: performs    Continued need for the following skills: Nursing    Plan for next visit: SN assessment, education      Patient and/or caregiver notified and agrees to changes in the Plan of Care YES/NO/NA: YES      The following discharge planning was discussed with the pt/caregiver:  Will d/c to home/self care when goals are met and patient is stable

## 2022-07-28 ENCOUNTER — HOME CARE VISIT (OUTPATIENT)
Dept: SCHEDULING | Facility: HOME HEALTH | Age: 77
End: 2022-07-28
Payer: MEDICARE

## 2022-07-28 PROCEDURE — G0152 HHCP-SERV OF OT,EA 15 MIN: HCPCS

## 2022-07-29 ENCOUNTER — HOME CARE VISIT (OUTPATIENT)
Dept: SCHEDULING | Facility: HOME HEALTH | Age: 77
End: 2022-07-29
Payer: MEDICARE

## 2022-07-29 VITALS
RESPIRATION RATE: 16 BRPM | HEART RATE: 84 BPM | TEMPERATURE: 98.5 F | OXYGEN SATURATION: 94 % | DIASTOLIC BLOOD PRESSURE: 61 MMHG | SYSTOLIC BLOOD PRESSURE: 132 MMHG

## 2022-07-29 PROCEDURE — G0157 HHC PT ASSISTANT EA 15: HCPCS

## 2022-07-29 NOTE — HOME HEALTH
Summary of clinical condition:  Pt had SOB with chest pain for 3 days and was seen in ED on 7/7/2022. She was dx with COPD exacerbation and admitted. Testing ruled out COVID-19 and PE. She was started on steroids and improved. She was discharged home on 7/13/2022 with orders for [facility] OT. Medical History: DM, HTN, Bilateral LE neuropathy, COPD, CVA, Cardiac stent, Lumbar fusion, Right TKR  Medications review completed. No adverse reactions noted for. Pt stopped taking Lopressor and prednisone on doctor's orders. Caregiver involvement: Pt's  assists with IADLs. Patient education provided this visit:  Pt was educated about the mechanism of breathing. She was educated in pursed lip breathing and when to use her supplemental oxygen. Home exercise program:  Pt was trained in HEP including bilateral shoulder flexion with deep breathing, straight arm clap in front, and 2 hands touch back of neck then lumbar x 5. ASSESSMENT:  Pt reported increase discomfort in her chest when performing shoulder flexion and IR. She had poor understanding of use of O2 and no understanding of energy conservation. Pt was able to perform dressing and showering but her O2 saturation dropped below 90%. Pt has decreased UE endurance for performing ADLs and IADLs. Pt was safe with bed, chair, and commode transfers but needs SBA for tub transfer. Pt becomes SOB when working in the kitchen. Pt will be seen for transfer training,  UE endurance exercises, ADL training, and energy conservation education. Patient Verbalized Goals:  Be able to work without SOB. Continued need for the following skills: Occupational Therapy  Pt/Caregiver instructed on plan of care and are agreeable to plan of care at this time. Discharge planning discussed with patient and caregiver. Discharge planning as follows: Pt will be seen 1 x week x 1, 2 x week x 1 then discharge when goals are met. Anand Alston Pt/Caregiver did verbalize understanding.

## 2022-08-01 ENCOUNTER — HOME CARE VISIT (OUTPATIENT)
Dept: SCHEDULING | Facility: HOME HEALTH | Age: 77
End: 2022-08-01
Payer: MEDICARE

## 2022-08-01 VITALS
TEMPERATURE: 98.1 F | RESPIRATION RATE: 16 BRPM | OXYGEN SATURATION: 91 % | DIASTOLIC BLOOD PRESSURE: 62 MMHG | HEART RATE: 72 BPM | SYSTOLIC BLOOD PRESSURE: 140 MMHG

## 2022-08-01 VITALS
RESPIRATION RATE: 16 BRPM | TEMPERATURE: 98.2 F | OXYGEN SATURATION: 92 % | DIASTOLIC BLOOD PRESSURE: 70 MMHG | HEART RATE: 70 BPM | SYSTOLIC BLOOD PRESSURE: 130 MMHG

## 2022-08-01 PROCEDURE — G0299 HHS/HOSPICE OF RN EA 15 MIN: HCPCS

## 2022-08-01 NOTE — HOME HEALTH
SUBJECTIVE: Patient reports she does not know how to use her one touch glucometer. CAREGIVER INVOLVEMENT/ASSISTANCE NEEDED FOR:   HOME HEALTH SUPPLIES BY TYPE AND QUANTITY ORDERED/DELIVERED THIS VISIT INCLUDE: None  OBJECTIVE:  See interventions. PATIENT RESPONSE TO TREATMENT: Good-no pain reported following treatment  PATIENT LEVEL OF UNDERSTANDING OF EDUCATION PROVIDED: Instructed with Deep breathing exercises when SOB  ASSESSMENT OF PROGRESS TOWARD GOALS:Gait training: within home 45' x 2 from bedroom to and from family room -O2 sat dropped to 88 then after several deep breaths went to 93 then back to 88 once applied 2 L/min 02 for within 1 minute increased to  94%-cues for deep breathing-needs reinforcement, Bed mobility -goal met -Independent-from Sup last visit  CONTINUED NEED FOR THE FOLLOWING SKILLS: Patient will be seen 2 x week for Physical Therapy to continue to work toward goals within POC and HHPT is medically necessary to address  dx and clinical findings: decreased ROM, decreased strength, impaired gait, decreased ability w stair negotiation, increased swelling, decreased transfer status, decreased endurance, decreased balance and decreased safety, increased pain in order to improve functional mobility/quality of life, decrease burden of care, reduce risk for re-hospitalization, work towards patient's personal goals of return to PLOF w decrease risk for falls. PLAN FOR NEXT VISIT: Gait/transfer training, strengthening exercises  THE FOLLOWING DISCHARGE PLANNING WAS DISCUSSED WITH THE PATIENT/CAREGIVER: This is visit number  4 out of  7  planned visits.   NEXT MD APPT:CHARMAINE

## 2022-08-01 NOTE — HOME HEALTH
SUBJECTIVE: BS yesterday 230, Patient reports she has a headche that started last night, she has increased SOB and some tightness with breathing; BS today is 290. She reports she had some diarrhea last night however it has since stopped. CAREGIVER INVOLVEMENT/ASSISTANCE NEEDED FOR:  able to offer any assist as needed. HOME HEALTH SUPPLIES BY TYPE AND QUANTITY ORDERED/DELIVERED THIS VISIT INCLUDE: None  OBJECTIVE:  See interventions. PATIENT RESPONSE TO TREATMENT: Fair-patient not feeling well  PATIENT LEVEL OF UNDERSTANDING OF EDUCATION PROVIDED: Patient able to use her One Touch Glucometer after instruction by return demonstration  ASSESSMENT OF PROGRESS TOWARD GOALS: Patient request activity to be held today due to headache-instructed patient to drink water due to diarrhea report last night and if sxs increase or do not resolve to go to MD or Urgent care-patient agreed. Reviewed SN 24 hour phone number with patient in admissions book. CONTINUED NEED FOR THE FOLLOWING SKILLS: Patient will be seen 2  x week for Physical Therapy to continue to work toward goals within POC and HHPT is medically necessary to address  dx and clinical findings: decreased ROM, decreased strength, impaired gait, decreased ability w stair negotiation, increased swelling, decreased bed mobility, decreased transfer status, decreased endurance, decreased balance and decreased safety, increased pain in order to improve functional mobility/quality of life, decrease burden of care, reduce risk for re-hospitalization, work towards patient's personal goals of return to PLOF w decrease risk for falls. PLAN FOR NEXT VISIT: Gait/transfer training, strengthening exercises/review of HEP  THE FOLLOWING DISCHARGE PLANNING WAS DISCUSSED WITH THE PATIENT/CAREGIVER: This is visit number 5  out of  7  planned visits.   NEXT MD APPT:CHARMAINE

## 2022-08-02 ENCOUNTER — HOME CARE VISIT (OUTPATIENT)
Dept: HOME HEALTH SERVICES | Facility: HOME HEALTH | Age: 77
End: 2022-08-02
Payer: MEDICARE

## 2022-08-02 ENCOUNTER — HOME CARE VISIT (OUTPATIENT)
Dept: SCHEDULING | Facility: HOME HEALTH | Age: 77
End: 2022-08-02
Payer: MEDICARE

## 2022-08-02 VITALS
OXYGEN SATURATION: 91 % | TEMPERATURE: 98 F | SYSTOLIC BLOOD PRESSURE: 142 MMHG | HEART RATE: 72 BPM | DIASTOLIC BLOOD PRESSURE: 64 MMHG | RESPIRATION RATE: 18 BRPM

## 2022-08-02 VITALS
DIASTOLIC BLOOD PRESSURE: 58 MMHG | OXYGEN SATURATION: 93 % | HEART RATE: 69 BPM | TEMPERATURE: 97.9 F | SYSTOLIC BLOOD PRESSURE: 120 MMHG

## 2022-08-02 PROCEDURE — G0157 HHC PT ASSISTANT EA 15: HCPCS

## 2022-08-02 NOTE — HOME HEALTH
Subjective:  I've had to take nitroglycerin twice in the last 7 days. Caregiver involvement: Pt's  assists with household tasks as needed. He is not home during the day. Medications reviewed and all medications are available in the home this visit. Medications are effective at this time. no new medication    Patient education provided this visit: signs and symptoms to seek medical attention. pain management. normal range for vital signs    Patient level of understanding of education provided: Pt verbalized understanding of education    Skilled Care Performed this visit: see interventions    Patient response to procedure performed:  Pt's tolerance for activity limited today by O2 sats decreasing with activity. Patient's Progress towards personal goals: Pt states she fell yesterday afternoon, see fall tracking section. She reports she has intermittent episodes of dizziness t/o the day today her blood glucose was 281 before breakfast and 314 at visit. Notified Dr. Ashley Lombardi (PCP) of pt's fall yesterday and of regularly increased blood glucose levels. Instructed pt to check O2 sats and blood glucose when dizzy. Pt has increased amb from 30 feet to amb 50 feet w/o AD in home over carpeted and non carpeted surfaces with supervision, touching walls and surfaces as she passed them. Upon sitting O2 sats had decreased from 95% to 90% on RA. She amb 50 feet with 4 wheeled walker mod I. Instructed pt to use walker to improve balance and endurance. Instructed pt to use O2 when amb due to decreased saturation with activity. Pt has appointment scheduled with cardiology tomorrow. Pt has improved transfers from min A to now mod I. Pt has improved bed mobility from supervision to now independent. Standing balance has improved as evidenced by Tinetti increasing from 11/28 to 25/28 and TUG decreasing from 25 sec to now 17 sec.       Continued need for the following skills: HHPT medically necessary to address decreased endurance and increased fall risk    Plan for next visit:  d/c HHPT next visit    The following discharge planning was discussed with the pt/caregiver: Discussed with pt, plan to d/c HHPT next visit

## 2022-08-03 NOTE — CASE COMMUNICATION
Assessment and Summary of Care:  Patient's current functional status before discharge is as follows  Bed Mobility: Pt has improved bed mobility from supervision to now independent. Transfers: Pt has improved transfers from min A to now mod I.    Gait: Pt has increased amb from 30 feet to amb 50 feet w/o AD in home over carpeted and non carpeted surfaces with supervision, touching walls and surfaces as she passed them. Upon sitting O2  sats had decreased from 95% to 90% on RA. She amb 50 feet with 4 wheeled walker mod I. Instructed pt to use walker to improve balance and endurance. Instructed pt to use O2 when amb due to decreased saturation with activity. Special Tests: Standing balance has improved as evidenced by Tinetti increasing from 11/28 to 25/28 and TUG decreasing from 25 sec to now 17 sec.     Recommendations: Pt scheduled for d/c from [address] next visit

## 2022-08-04 ENCOUNTER — HOME CARE VISIT (OUTPATIENT)
Dept: SCHEDULING | Facility: HOME HEALTH | Age: 77
End: 2022-08-04
Payer: MEDICARE

## 2022-08-04 PROCEDURE — G0151 HHCP-SERV OF PT,EA 15 MIN: HCPCS

## 2022-08-04 NOTE — PROGRESS NOTES
Physician Progress Note      PATIENT:               Stacie Briones  CSN #:                  [id number]  :                       1945  ADMIT DATE:       2022 9:42 AM  DISCH DATE:        2022 4:31 PM  RESPONDING  PROVIDER #:        Aquiles Molina MD          QUERY TEXT:    Pt admitted with SOB and has respiratory failure documented. If possible, please document in progress notes and discharge summary further specificity regarding the type and acuity of respiratory failure: The medical record reflects the following:  Risk Factors: COPD  Clinical Indicators: Per the ER physician:. Pt uses her sister's supplemental oxygen (who recently ) prn at home and notes she normally never needs it but has been using it more over last several days. Physicial exam: Pulmonary: Tachypnea present, + wheezing  O2 sats dropped to 85-89% in ER and patient was placed on NC O2. ABG: pH 7.38 pCO2 40.2 pO2 69 HCO3 23.8 sO2 93.2% on 36% Fio2  ER Impression: COPD exacerbation, Hypoxemia  H&P: In ER her vital signs in normal range except for her oxygen in high 80s. She was placed on 2 L of oxygen with improvement in her oxygenation. DC summary: Chronic respiratory failure secondary to COPD, she failed walk test Home oxygen arranged. Treatment: O2, ABG's, Solumedrol, Brovanan, Pulmicort, Nebs, dc home on O2 and plan f/u with Pulmonologist    Endy Fuentes RN, BSN, [address] [place] [address]  [phone]  Options provided:  -- Acute on Chronic respiratory failure with hypoxia  -- Chronic respiratory failure with hypoxia  -- Other - I will add my own diagnosis  -- Disagree - Not applicable / Not valid  -- Disagree - Clinically unable to determine / Unknown  -- Refer to Clinical Documentation Reviewer    PROVIDER RESPONSE TEXT:    This patient has chronic respiratory failure with hypoxia.     Query created by: Ginny Done on 7/15/2022 11:02 AM      Electronically signed by:  Aquiles Molina MD 2022 1:19 PM

## 2022-08-04 NOTE — PROGRESS NOTES
Physician Progress Note      PATIENT:               Clark Lopez  CSN #:                  [id number]  :                       1945  ADMIT DATE:       2022 9:42 AM  DISCH DATE:        2022 4:31 PM  RESPONDING  PROVIDER #:        Shannen Aguilera MD          QUERY TEXT:    Pt admitted with SOB and has respiratory failure documented. If possible, please document in progress notes and discharge summary further specificity regarding the type and acuity of respiratory failure: The medical record reflects the following:  Risk Factors: COPD  Clinical Indicators: Per the ER physician:. Pt uses her sister's supplemental oxygen (who recently ) prn at home and notes she normally never needs it but has been using it more over last several days. Physicial exam: Pulmonary: Tachypnea present, + wheezing  O2 sats dropped to 85-89% in ER and patient was placed on NC O2. ABG: pH 7.38 pCO2 40.2 pO2 69 HCO3 23.8 sO2 93.2% on 36% Fio2  ER Impression: COPD exacerbation, Hypoxemia  H&P: In ER her vital signs in normal range except for her oxygen in high 80s. She was placed on 2 L of oxygen with improvement in her oxygenation. DC summary: Chronic respiratory failure secondary to COPD, she failed walk test Home oxygen arranged. Treatment: O2, ABG's, Solumedrol, Brovanan, Pulmicort, Nebs, dc home on O2 and plan f/u with Pulmonologist    Susi Monsivais RN, BSN, [address] [place] [address]  [phone]  Options provided:  -- Acute on Chronic respiratory failure with hypoxia  -- Chronic respiratory failure with hypoxia  -- Other - I will add my own diagnosis  -- Disagree - Not applicable / Not valid  -- Disagree - Clinically unable to determine / Unknown  -- Refer to Clinical Documentation Reviewer    PROVIDER RESPONSE TEXT:    This patient has chronic respiratory failure with hypoxia.     Query created by: Jay Pineda on 7/15/2022 11:02 AM      Electronically signed by:  Shannen Aguilera MD 2022 1:19 PM

## 2022-08-05 ENCOUNTER — HOME CARE VISIT (OUTPATIENT)
Dept: SCHEDULING | Facility: HOME HEALTH | Age: 77
End: 2022-08-05
Payer: MEDICARE

## 2022-08-05 PROCEDURE — G0152 HHCP-SERV OF OT,EA 15 MIN: HCPCS

## 2022-08-05 RX ORDER — INSULIN LISPRO 200 [IU]/ML
5 INJECTION, SOLUTION SUBCUTANEOUS
Status: ON HOLD | COMMUNITY
End: 2022-08-08

## 2022-08-05 RX ORDER — ISOSORBIDE MONONITRATE 30 MG/1
30 TABLET, EXTENDED RELEASE ORAL DAILY
COMMUNITY

## 2022-08-05 RX ORDER — TRIAMCINOLONE ACETONIDE 0.25 MG/G
CREAM TOPICAL 2 TIMES DAILY
Status: ON HOLD | COMMUNITY
End: 2022-08-08

## 2022-08-05 RX ORDER — GLUCAGON 1 MG
VIAL (EA) INJECTION
COMMUNITY

## 2022-08-05 RX ORDER — CYANOCOBALAMIN 1000 UG/ML
1000 INJECTION, SOLUTION INTRAMUSCULAR; SUBCUTANEOUS
COMMUNITY

## 2022-08-05 RX ORDER — MAGNESIUM SULFATE 100 %
15 CRYSTALS MISCELLANEOUS AS NEEDED
COMMUNITY

## 2022-08-05 RX ORDER — METOPROLOL SUCCINATE 50 MG/1
50 TABLET, EXTENDED RELEASE ORAL DAILY
Status: ON HOLD | COMMUNITY
End: 2022-08-08

## 2022-08-05 RX ORDER — INSULIN LISPRO 100 [IU]/ML
INJECTION, SOLUTION INTRAVENOUS; SUBCUTANEOUS
Status: ON HOLD | COMMUNITY
End: 2022-08-08

## 2022-08-05 RX ORDER — INSULIN GLARGINE 100 [IU]/ML
30 INJECTION, SOLUTION SUBCUTANEOUS DAILY
COMMUNITY

## 2022-08-05 RX ORDER — AMITRIPTYLINE HYDROCHLORIDE 10 MG/1
TABLET, FILM COATED ORAL
Status: ON HOLD | COMMUNITY
End: 2022-08-08

## 2022-08-05 RX ORDER — FLUTICASONE FUROATE, UMECLIDINIUM BROMIDE AND VILANTEROL TRIFENATATE 200; 62.5; 25 UG/1; UG/1; UG/1
1 POWDER RESPIRATORY (INHALATION) DAILY
Status: ON HOLD | COMMUNITY
End: 2022-08-08

## 2022-08-05 RX ORDER — OMEPRAZOLE 20 MG/1
20 CAPSULE, DELAYED RELEASE ORAL DAILY
Status: ON HOLD | COMMUNITY
End: 2022-08-08

## 2022-08-05 RX ORDER — LOSARTAN POTASSIUM AND HYDROCHLOROTHIAZIDE 25; 100 MG/1; MG/1
1 TABLET ORAL DAILY
Status: ON HOLD | COMMUNITY
End: 2022-08-08

## 2022-08-05 NOTE — CASE COMMUNICATION
Solange Miles received skilled PT, OT and RN for a dx of COPD. This patient has currently met maximum potential with in home PT and has been discharged to a [facility] under the care and supervision of her family and cardiologist at this time. She reports that she saw her cardiologist yesterday for persistent chest pain and she is scheduled for a cardiac cath Monday. The patient is not a candiate for additional PT at this time due to this undi agnosed chest pain and was instructed to go to the ER if she experienced s/s of MI to include chest and back pain. Patient verbalized understanding of all discharge instructions and is in agreement with discharge this visit.      Vitals and patient signature lost due to computer sync error

## 2022-08-05 NOTE — HOME HEALTH
S: Patient stated she has severe chest pain last night that woke her   O: PAIN: see pain tab   WOUND:no open wounds noted or reported. ROM: no impairments noted    BED MOBILITY: independent   EQUIPMENT: 4WW, O2 and patient instructed in proper use of portable O2 she had just recieved in the mail from a family member. She was instructed to use at home and to monitor her O2 sats and the life of the battery today so she is able to plan trips based on the battery life and to make sure it is providing her the support she requires to maintain her O2 sats in the 90's. TRANSFERS: MI  GAIT: MI with 4WW, patient reporting 6/10 dyspnea at reqest on 2L O2 and 10/10 with activity so any physical testing or HEP are contraindicated this visit. STEPS: not assessed due to SOB   A:ASSESSMENT AND PROGRESS TOWARD GOALS:  Debbie Palacios received skilled PT, OT and RN for a dx of COPD. This patient has currently met maximum potential with in home PT and has been discharged to a [facility] under the care and supervision of her family and cardiologist at this time. She reports that she saw her cardiologist yesterday for persistent chest pain and she is scheduled for a cardiac cath Monday. The patient is not a candiate for additional PT at this time due to this undiagnosed chest pain and was instructed to go to the ER if she experienced s/s of MI to include chest and back pain. Patient verbalized understanding of all discharge instructions and is in agreement with discharge this visit. P: DC    1. Discharge medication list reconciled with patient and caregiver. Questions regarding medications answered and patient/caregiver advised to refer to MD for any medication questions after discharge. 2. Patient to continue use of the following assistive device for maximum safety: 4WW, O2  3. Today's treatment included: review of therapeutic exercise program, reassessment of mobility, transfers, balance and gait.    4. Patient and caregiver demonstrate understanding of DC instructions and repeat verbalization. Patient and caregiver given written copy of instructions. 5. Patient and caregiver given notification of discharge and in agreement with DC this date. 6. MD notified of discharge.

## 2022-08-06 VITALS
HEART RATE: 87 BPM | TEMPERATURE: 98.2 F | OXYGEN SATURATION: 91 % | SYSTOLIC BLOOD PRESSURE: 133 MMHG | DIASTOLIC BLOOD PRESSURE: 58 MMHG | RESPIRATION RATE: 16 BRPM

## 2022-08-06 NOTE — HOME HEALTH
SUBJECTIVE: Pt said she is having a heart cath done on Monday. CAREGIVER INVOLVEMENT/ASSISTANCE NEEDED FOR: Pt's family assists with IADLs and transportation. OBJECTIVE:  See interventions. PATIENT RESPONSE TO TREATMENT:    PATIENT LEVEL OF UNDERSTANDING OF EDUCATION PROVIDED: Pt was educated about when she needs to use her supplemental O2. She repeated back the proper use of O2.    ASSESSMENT OF PROGRESS TOWARD GOALS: Pt demonstrated improved tub transfer and has met this goal.  She verbalized improved understanding of when to use techniques to raise her O2 levels but did not demonstrate during active function. She allowed me to move her oxygen concentrator to a more cental location so she can use it all the time. CONTINUED NEED FOR THE FOLLOWING SKILLS: Pt's O2 continues to drop when performing tasks and needs VC to bring it back up. She missed one visit so she has not met her goals. Pt's doctor will be contacted to ask for 2 more visits to work on energy conservation and breathing exercises. PLAN FOR NEXT VISIT: Pt will be seen for there ex and energy conservation education. THE FOLLOWING DISCHARGE PLANNING WAS DISCUSSED WITH THE PATIENT/CAREGIVER: Pt will be seen 2 x week x 1.

## 2022-08-08 ENCOUNTER — HOSPITAL ENCOUNTER (OUTPATIENT)
Age: 77
Setting detail: OUTPATIENT SURGERY
Discharge: HOME | End: 2022-08-08
Attending: INTERNAL MEDICINE | Admitting: INTERNAL MEDICINE
Payer: MEDICARE

## 2022-08-08 VITALS
WEIGHT: 207 LBS | BODY MASS INDEX: 35.34 KG/M2 | DIASTOLIC BLOOD PRESSURE: 53 MMHG | HEIGHT: 64 IN | HEART RATE: 54 BPM | TEMPERATURE: 97.9 F | RESPIRATION RATE: 20 BRPM | OXYGEN SATURATION: 96 % | SYSTOLIC BLOOD PRESSURE: 152 MMHG

## 2022-08-08 DIAGNOSIS — R07.9 CHEST PAIN, UNSPECIFIED TYPE: ICD-10-CM

## 2022-08-08 LAB
ANION GAP SERPL CALC-SCNC: 6 MMOL/L (ref 3–18)
ATRIAL RATE: 64 BPM
BUN SERPL-MCNC: 14 MG/DL (ref 7–18)
BUN/CREAT SERPL: 16 (ref 12–20)
CALCIUM SERPL-MCNC: 9.2 MG/DL (ref 8.5–10.1)
CALCULATED P AXIS, ECG09: 73 DEGREES
CALCULATED R AXIS, ECG10: 91 DEGREES
CALCULATED T AXIS, ECG11: 60 DEGREES
CHLORIDE SERPL-SCNC: 106 MMOL/L (ref 100–111)
CHOLEST SERPL-MCNC: 134 MG/DL
CO2 SERPL-SCNC: 25 MMOL/L (ref 21–32)
CREAT SERPL-MCNC: 0.87 MG/DL (ref 0.6–1.3)
DIAGNOSIS, 93000: NORMAL
ERYTHROCYTE [DISTWIDTH] IN BLOOD BY AUTOMATED COUNT: 19.4 % (ref 11.6–14.5)
GLUCOSE SERPL-MCNC: 250 MG/DL (ref 74–99)
HCT VFR BLD AUTO: 34.4 % (ref 35–45)
HDLC SERPL-MCNC: 46 MG/DL (ref 40–60)
HDLC SERPL: 2.9 {RATIO} (ref 0–5)
HGB BLD-MCNC: 10.8 G/DL (ref 12–16)
INR PPP: 0.9 (ref 0.8–1.2)
LDLC SERPL CALC-MCNC: 57.4 MG/DL (ref 0–100)
LIPID PROFILE,FLP: ABNORMAL
MAGNESIUM SERPL-MCNC: 1.7 MG/DL (ref 1.6–2.6)
MCH RBC QN AUTO: 24.3 PG (ref 24–34)
MCHC RBC AUTO-ENTMCNC: 31.4 G/DL (ref 31–37)
MCV RBC AUTO: 77.5 FL (ref 78–100)
NRBC # BLD: 0 K/UL (ref 0–0.01)
NRBC BLD-RTO: 0 PER 100 WBC
P-R INTERVAL, ECG05: 176 MS
PLATELET # BLD AUTO: 251 K/UL (ref 135–420)
PMV BLD AUTO: 10.2 FL (ref 9.2–11.8)
POTASSIUM SERPL-SCNC: 4.2 MMOL/L (ref 3.5–5.5)
PROTHROMBIN TIME: 12.8 SEC (ref 11.5–15.2)
Q-T INTERVAL, ECG07: 444 MS
QRS DURATION, ECG06: 74 MS
QTC CALCULATION (BEZET), ECG08: 458 MS
RBC # BLD AUTO: 4.44 M/UL (ref 4.2–5.3)
SODIUM SERPL-SCNC: 137 MMOL/L (ref 136–145)
TRIGL SERPL-MCNC: 153 MG/DL (ref ?–150)
VENTRICULAR RATE, ECG03: 64 BPM
VLDLC SERPL CALC-MCNC: 30.6 MG/DL
WBC # BLD AUTO: 3.9 K/UL (ref 4.6–13.2)

## 2022-08-08 PROCEDURE — 77030008543 HC TBNG MON PRSS MRTM -A: Performed by: INTERNAL MEDICINE

## 2022-08-08 PROCEDURE — 99152 MOD SED SAME PHYS/QHP 5/>YRS: CPT | Performed by: INTERNAL MEDICINE

## 2022-08-08 PROCEDURE — 77030013797 HC KT TRNSDUC PRSSR EDWD -A: Performed by: INTERNAL MEDICINE

## 2022-08-08 PROCEDURE — 83735 ASSAY OF MAGNESIUM: CPT

## 2022-08-08 PROCEDURE — C1769 GUIDE WIRE: HCPCS | Performed by: INTERNAL MEDICINE

## 2022-08-08 PROCEDURE — C1894 INTRO/SHEATH, NON-LASER: HCPCS | Performed by: INTERNAL MEDICINE

## 2022-08-08 PROCEDURE — 80048 BASIC METABOLIC PNL TOTAL CA: CPT

## 2022-08-08 PROCEDURE — 93460 R&L HRT ART/VENTRICLE ANGIO: CPT | Performed by: INTERNAL MEDICINE

## 2022-08-08 PROCEDURE — 74011250637 HC RX REV CODE- 250/637: Performed by: INTERNAL MEDICINE

## 2022-08-08 PROCEDURE — 77030010221 HC SPLNT WR POS TELE -B: Performed by: INTERNAL MEDICINE

## 2022-08-08 PROCEDURE — 93005 ELECTROCARDIOGRAM TRACING: CPT

## 2022-08-08 PROCEDURE — C1751 CATH, INF, PER/CENT/MIDLINE: HCPCS | Performed by: INTERNAL MEDICINE

## 2022-08-08 PROCEDURE — 74011000636 HC RX REV CODE- 636: Performed by: INTERNAL MEDICINE

## 2022-08-08 PROCEDURE — 74011250636 HC RX REV CODE- 250/636: Performed by: INTERNAL MEDICINE

## 2022-08-08 PROCEDURE — 85027 COMPLETE CBC AUTOMATED: CPT

## 2022-08-08 PROCEDURE — 77030004522 HC CATH ANGI DX EXPO BSC -A: Performed by: INTERNAL MEDICINE

## 2022-08-08 PROCEDURE — 80061 LIPID PANEL: CPT

## 2022-08-08 PROCEDURE — 99153 MOD SED SAME PHYS/QHP EA: CPT | Performed by: INTERNAL MEDICINE

## 2022-08-08 PROCEDURE — 74011000250 HC RX REV CODE- 250: Performed by: INTERNAL MEDICINE

## 2022-08-08 PROCEDURE — 85610 PROTHROMBIN TIME: CPT

## 2022-08-08 PROCEDURE — 36415 COLL VENOUS BLD VENIPUNCTURE: CPT

## 2022-08-08 PROCEDURE — 77030004521 HC CATH ANGI DX COOK -B: Performed by: INTERNAL MEDICINE

## 2022-08-08 RX ORDER — MIDAZOLAM HYDROCHLORIDE 1 MG/ML
INJECTION, SOLUTION INTRAMUSCULAR; INTRAVENOUS AS NEEDED
Status: DISCONTINUED | OUTPATIENT
Start: 2022-08-08 | End: 2022-08-08 | Stop reason: HOSPADM

## 2022-08-08 RX ORDER — SODIUM CHLORIDE 9 MG/ML
10 INJECTION, SOLUTION INTRAVENOUS CONTINUOUS
Status: DISCONTINUED | OUTPATIENT
Start: 2022-08-08 | End: 2022-08-08 | Stop reason: HOSPADM

## 2022-08-08 RX ORDER — HEPARIN SODIUM 1000 [USP'U]/ML
INJECTION, SOLUTION INTRAVENOUS; SUBCUTANEOUS AS NEEDED
Status: DISCONTINUED | OUTPATIENT
Start: 2022-08-08 | End: 2022-08-08 | Stop reason: HOSPADM

## 2022-08-08 RX ORDER — LIDOCAINE HYDROCHLORIDE 10 MG/ML
INJECTION INFILTRATION; PERINEURAL AS NEEDED
Status: DISCONTINUED | OUTPATIENT
Start: 2022-08-08 | End: 2022-08-08 | Stop reason: HOSPADM

## 2022-08-08 RX ORDER — VERAPAMIL HYDROCHLORIDE 2.5 MG/ML
INJECTION, SOLUTION INTRAVENOUS AS NEEDED
Status: DISCONTINUED | OUTPATIENT
Start: 2022-08-08 | End: 2022-08-08 | Stop reason: HOSPADM

## 2022-08-08 RX ORDER — ACETAMINOPHEN 325 MG/1
650 TABLET ORAL
Status: COMPLETED | OUTPATIENT
Start: 2022-08-08 | End: 2022-08-08

## 2022-08-08 RX ORDER — GUAIFENESIN 100 MG/5ML
81 LIQUID (ML) ORAL ONCE
Status: COMPLETED | OUTPATIENT
Start: 2022-08-08 | End: 2022-08-08

## 2022-08-08 RX ORDER — SODIUM CHLORIDE 0.9 % (FLUSH) 0.9 %
5-40 SYRINGE (ML) INJECTION AS NEEDED
Status: DISCONTINUED | OUTPATIENT
Start: 2022-08-08 | End: 2022-08-08 | Stop reason: HOSPADM

## 2022-08-08 RX ORDER — SODIUM CHLORIDE 0.9 % (FLUSH) 0.9 %
5-40 SYRINGE (ML) INJECTION EVERY 8 HOURS
Status: DISCONTINUED | OUTPATIENT
Start: 2022-08-08 | End: 2022-08-08 | Stop reason: HOSPADM

## 2022-08-08 RX ORDER — FENTANYL CITRATE 50 UG/ML
INJECTION, SOLUTION INTRAMUSCULAR; INTRAVENOUS AS NEEDED
Status: DISCONTINUED | OUTPATIENT
Start: 2022-08-08 | End: 2022-08-08 | Stop reason: HOSPADM

## 2022-08-08 RX ORDER — HEPARIN SODIUM 200 [USP'U]/100ML
INJECTION, SOLUTION INTRAVENOUS
Status: COMPLETED | OUTPATIENT
Start: 2022-08-08 | End: 2022-08-08

## 2022-08-08 RX ADMIN — SODIUM CHLORIDE 10 ML/HR: 9 INJECTION, SOLUTION INTRAVENOUS at 08:44

## 2022-08-08 RX ADMIN — ASPIRIN 81 MG: 81 TABLET, CHEWABLE ORAL at 09:00

## 2022-08-08 RX ADMIN — ACETAMINOPHEN 650 MG: 325 TABLET ORAL at 15:07

## 2022-08-08 NOTE — PROGRESS NOTES
Cardiology Progress Note        Patient: Fareed Mendez        Sex: female          DOA: 8/8/2022  YOB: 1945      Age:  77 y.o.        LOS:  LOS: 0 days   Assessment/Plan   Date of Surgery Update:  Fareed Mendez was seen and examined. History and physical has been reviewed. The patient has been examined.  There have been no significant clinical changes since the completion of the originally dated History and Physical.    Signed By: Carol Lynch MD     August 8, 2022 11:19 AM          Signed By: Carol Lynch MD     August 8, 2022

## 2022-08-08 NOTE — Clinical Note
TRANSFER - IN REPORT:     Verbal report received from: holding rm. rn.     Report consisted of patient's Situation, Background, Assessment and   Recommendations(SBAR). Opportunity for questions and clarification was provided. Assessment completed upon patient's arrival to unit and care assumed. Patient transported with a Registered Nurse.

## 2022-08-08 NOTE — PROGRESS NOTES
Pt is all prepped and ready for procedure    1005 Pt picked up for procedure    1115 Pt back to care unit S/P Lt & Rt Heart caths. Pt awake and alert and tolerated procedure well. Tr band to right wrist with immobilizer in place. Rt femoral vein accessed with venous sheath still in place. Precautions reinforced with HOb flat    1250 Rt femoral venous sheath pulled, manual pressure applied for 10 minutes and tolerated well. Sterile 2x2 with tegaderm dressing applied to site. 1300 Initiating removing g air from tr band till all out    1320 Tr band removed and tolerated well. Sterile 2x2 with tegaderm dressing applied to site. Immobilizer back in place. Precautions reinforced. 1500 Pt HOB elevated, pt c/o back pain 10/10 from laying flat. 1507 tylenol  650 mg adm     1545 Discharge instructions reviewed with pot and spouse and they verbalized all understandings  Back pain improved    1550 Pt escorted to car and left with spouse in stable condition.

## 2022-08-08 NOTE — Clinical Note
Right femoral vein. Accessed successfully. Micropuncture needle used. Using fluoro guidance.  Number of attempts =  1.

## 2022-08-08 NOTE — DISCHARGE INSTRUCTIONS
DISCHARGE SUMMARY from Nurse    PATIENT INSTRUCTIONS:    After general anesthesia or intravenous sedation, for 24 hours or while taking prescription Narcotics:  Limit your activities  Do not drive and operate hazardous machinery  Do not make important personal or business decisions  Do  not drink alcoholic beverages  If you have not urinated within 8 hours after discharge, please contact your surgeon on call. Report the following to your surgeon:  Excessive pain, swelling, redness or odor of or around the surgical area  Temperature over 100.5  Nausea and vomiting lasting longer than 4 hours or if unable to take medications  Any signs of decreased circulation or nerve impairment to extremity: change in color, persistent  numbness, tingling, coldness or increase pain  Any questions    What to do at Home:  Recommended activity: No heavy lifting, pushing, pulling for , one week      *  Please give a list of your current medications to your Primary Care Provider. *  Please update this list whenever your medications are discontinued, doses are      changed, or new medications (including over-the-counter products) are added. *  Please carry medication information at all times in case of emergency situations. These are general instructions for a healthy lifestyle:    No smoking/ No tobacco products/ Avoid exposure to second hand smoke  Surgeon General's Warning:  Quitting smoking now greatly reduces serious risk to your health. Obesity, smoking, and sedentary lifestyle greatly increases your risk for illness    A healthy diet, regular physical exercise & weight monitoring are important for maintaining a healthy lifestyle    You may be retaining fluid if you have a history of heart failure or if you experience any of the following symptoms:  Weight gain of 3 pounds or more overnight or 5 pounds in a week, increased swelling in our hands or feet or shortness of breath while lying flat in bed.   Please call your doctor as soon as you notice any of these symptoms; do not wait until your next office visit. The discharge information has been reviewed with the patient and spouse. The patient and spouse verbalized understanding. Discharge medications reviewed with the patient and spouse and appropriate educational materials and side effects teaching were provided. ___________________________________________________________________________________________________________________________________                 Cardiac Catheterization/Angiography Discharge Instructions    *Check the puncture site frequently for swelling or bleeding. If you see any bleeding, lie down and apply pressure over the area with a clean town or washcloth. Notify your doctor for any redness, swelling, drainage or oozing from the puncture site. Notify your doctor for any fever or chills. *If the leg or arm with the puncture becomes cold, numb or painful, call Dr Monica Senior     *Activity should be limited for the next 48 hours. Climb stairs as little as possible and avoid any stooping, bending or strenuous activity for 48 hours. No heavy lifting (anything over 10 pounds) for three days. *Do not drive for 48 hours. *You may resume your usual diet. Drink more fluids than usual.    *Have a responsible person drive you home and stay with you for at least 24 hours after your heart catheterization/angiography. *You may remove the bandage from your Right and Arm in 24 hours. You may shower in 24 hours. No tub baths, hot tubs or swimming for one week. Do not place any lotions, creams, powders, ointments over the puncture site for one week. You may place a clean band-aid over the puncture site each day for 5 days. Change this daily.

## 2022-08-08 NOTE — Clinical Note
Contrast Dose Calculator:   Patient's age: 77.   Patient's sex: Female. Patient weight (kg) = 94. Creatinine level (mg/dL) = 0.9. Creatinine clearance (mL/min): 80.15. Max Contrast dose per Creatinine Cl calculator = 180.33 mL.

## 2022-08-08 NOTE — Clinical Note
TRANSFER - OUT REPORT:     Verbal report given to: LANNY AL RN. Report consisted of patient's Situation, Background, Assessment and   Recommendations(SBAR). Opportunity for questions and clarification was provided. Patient transported with a Registered Nurse and [address] [place] [address] / [facility].

## 2022-08-08 NOTE — BRIEF OP NOTE
Brief Postoperative Note    Patient: Emi Flores  YOB: 1945  MRN: [id number]    Date of Procedure: 8/8/2022     Pre-Op Diagnosis: Chest pain, unspecified type [R07.9]    Post-Op Diagnosis: Same as preoperative diagnosis. Procedure(s):  LEFT AND RIGHT HEART CATH / CORONARY ANGIOGRAPHY    Surgeon(s):  Kristal Wilkinson MD    Surgical Assistant: None    Anesthesia: Con-Sed     Estimated Blood Loss (mL): less than 50     Complications: None    Specimens: * No specimens in log *     Implants: * No implants in log *    Drains: * No LDAs found *    Findings: Patent coronary arteries. Severe pulmonary hypertension- mixed type  Complete report to follow. thx    Electronically Signed by Mariana Morgan MD on 8/8/2022 at 11:09 AM

## 2022-08-09 ENCOUNTER — HOME CARE VISIT (OUTPATIENT)
Dept: HOME HEALTH SERVICES | Facility: HOME HEALTH | Age: 77
End: 2022-08-09
Payer: MEDICARE

## 2022-08-10 ENCOUNTER — HOME CARE VISIT (OUTPATIENT)
Dept: SCHEDULING | Facility: HOME HEALTH | Age: 77
End: 2022-08-10
Payer: MEDICARE

## 2022-08-10 VITALS
HEART RATE: 64 BPM | RESPIRATION RATE: 20 BRPM | OXYGEN SATURATION: 97 % | DIASTOLIC BLOOD PRESSURE: 62 MMHG | SYSTOLIC BLOOD PRESSURE: 146 MMHG | TEMPERATURE: 98 F

## 2022-08-10 PROCEDURE — G0299 HHS/HOSPICE OF RN EA 15 MIN: HCPCS

## 2022-08-11 ENCOUNTER — HOME CARE VISIT (OUTPATIENT)
Dept: HOME HEALTH SERVICES | Facility: HOME HEALTH | Age: 77
End: 2022-08-11
Payer: MEDICARE

## 2022-09-22 ENCOUNTER — HOSPITAL ENCOUNTER (OUTPATIENT)
Dept: GENERAL RADIOLOGY | Age: 77
Discharge: HOME | End: 2022-09-22
Payer: MEDICARE

## 2022-09-22 ENCOUNTER — TRANSCRIBE ORDER (OUTPATIENT)
Dept: REGISTRATION | Age: 77
End: 2022-09-22

## 2022-09-22 ENCOUNTER — TRANSCRIBE ORDER (OUTPATIENT)
Dept: SCHEDULING | Age: 77
End: 2022-09-22

## 2022-09-22 DIAGNOSIS — J44.9 OBSTRUCTIVE CHRONIC BRONCHITIS WITHOUT EXACERBATION (HCC): Primary | ICD-10-CM

## 2022-09-22 DIAGNOSIS — J44.9 OBSTRUCTIVE CHRONIC BRONCHITIS WITHOUT EXACERBATION (HCC): ICD-10-CM

## 2022-09-22 PROCEDURE — 71046 X-RAY EXAM CHEST 2 VIEWS: CPT

## 2022-10-04 ENCOUNTER — HOSPITAL ENCOUNTER (OUTPATIENT)
Dept: NUCLEAR MEDICINE | Age: 77
Discharge: HOME | End: 2022-10-04
Attending: INTERNAL MEDICINE
Payer: MEDICARE

## 2022-10-04 ENCOUNTER — HOSPITAL ENCOUNTER (OUTPATIENT)
Dept: GENERAL RADIOLOGY | Age: 77
Discharge: HOME | End: 2022-10-04
Attending: INTERNAL MEDICINE
Payer: MEDICARE

## 2022-10-04 DIAGNOSIS — J44.9 OBSTRUCTIVE CHRONIC BRONCHITIS WITHOUT EXACERBATION (HCC): ICD-10-CM

## 2022-10-04 PROCEDURE — 78580 LUNG PERFUSION IMAGING: CPT

## 2022-10-04 PROCEDURE — 71046 X-RAY EXAM CHEST 2 VIEWS: CPT

## 2022-11-01 ENCOUNTER — APPOINTMENT (OUTPATIENT)
Dept: GENERAL RADIOLOGY | Age: 77
End: 2022-11-01
Attending: PHYSICIAN ASSISTANT
Payer: MEDICARE

## 2022-11-01 ENCOUNTER — HOSPITAL ENCOUNTER (EMERGENCY)
Age: 77
Discharge: HOME | End: 2022-11-01
Attending: EMERGENCY MEDICINE
Payer: MEDICARE

## 2022-11-01 ENCOUNTER — APPOINTMENT (OUTPATIENT)
Dept: CT IMAGING | Age: 77
End: 2022-11-01
Attending: PHYSICIAN ASSISTANT
Payer: MEDICARE

## 2022-11-01 VITALS
HEART RATE: 87 BPM | DIASTOLIC BLOOD PRESSURE: 64 MMHG | RESPIRATION RATE: 18 BRPM | BODY MASS INDEX: 34.49 KG/M2 | SYSTOLIC BLOOD PRESSURE: 177 MMHG | TEMPERATURE: 98 F | WEIGHT: 202 LBS | HEIGHT: 64 IN | OXYGEN SATURATION: 94 %

## 2022-11-01 DIAGNOSIS — R49.0 HOARSENESS OF VOICE: ICD-10-CM

## 2022-11-01 DIAGNOSIS — M79.10 MYALGIA: ICD-10-CM

## 2022-11-01 DIAGNOSIS — N39.0 URINARY TRACT INFECTION WITHOUT HEMATURIA, SITE UNSPECIFIED: ICD-10-CM

## 2022-11-01 DIAGNOSIS — R10.12 ABDOMINAL PAIN, LUQ (LEFT UPPER QUADRANT): ICD-10-CM

## 2022-11-01 DIAGNOSIS — R51.9 NONINTRACTABLE HEADACHE, UNSPECIFIED CHRONICITY PATTERN, UNSPECIFIED HEADACHE TYPE: Primary | ICD-10-CM

## 2022-11-01 LAB
ALBUMIN SERPL-MCNC: 3.7 G/DL (ref 3.4–5)
ALBUMIN/GLOB SERPL: 0.9 {RATIO} (ref 0.8–1.7)
ALP SERPL-CCNC: 90 U/L (ref 45–117)
ALT SERPL-CCNC: 39 U/L (ref 13–56)
ANION GAP SERPL CALC-SCNC: 6 MMOL/L (ref 3–18)
APPEARANCE UR: CLEAR
APTT PPP: 26.8 SEC (ref 23–36.4)
AST SERPL-CCNC: 49 U/L (ref 10–38)
BACTERIA URNS QL MICRO: ABNORMAL /HPF
BASOPHILS # BLD: 0.1 K/UL (ref 0–0.1)
BASOPHILS NFR BLD: 1 % (ref 0–2)
BILIRUB SERPL-MCNC: 0.8 MG/DL (ref 0.2–1)
BILIRUB UR QL: NEGATIVE
BNP SERPL-MCNC: 377 PG/ML (ref 0–1800)
BUN SERPL-MCNC: 13 MG/DL (ref 7–18)
BUN/CREAT SERPL: 14 (ref 12–20)
CALCIUM SERPL-MCNC: 9.3 MG/DL (ref 8.5–10.1)
CHLORIDE SERPL-SCNC: 107 MMOL/L (ref 100–111)
CK SERPL-CCNC: 95 U/L (ref 26–192)
CO2 SERPL-SCNC: 25 MMOL/L (ref 21–32)
COLOR UR: YELLOW
COVID-19 RAPID TEST, COVR: NOT DETECTED
CREAT SERPL-MCNC: 0.95 MG/DL (ref 0.6–1.3)
DIFFERENTIAL METHOD BLD: ABNORMAL
EOSINOPHIL # BLD: 0.4 K/UL (ref 0–0.4)
EOSINOPHIL NFR BLD: 6 % (ref 0–5)
EPITH CASTS URNS QL MICRO: ABNORMAL /LPF (ref 0–5)
ERYTHROCYTE [DISTWIDTH] IN BLOOD BY AUTOMATED COUNT: 18.9 % (ref 11.6–14.5)
FLUAV AG NPH QL IA: NEGATIVE
FLUBV AG NOSE QL IA: NEGATIVE
GLOBULIN SER CALC-MCNC: 4.3 G/DL (ref 2–4)
GLUCOSE SERPL-MCNC: 129 MG/DL (ref 74–99)
GLUCOSE UR STRIP.AUTO-MCNC: NEGATIVE MG/DL
HCT VFR BLD AUTO: 37 % (ref 35–45)
HGB BLD-MCNC: 11.4 G/DL (ref 12–16)
HGB UR QL STRIP: NEGATIVE
HYALINE CASTS URNS QL MICRO: ABNORMAL /LPF (ref 0–2)
IMM GRANULOCYTES # BLD AUTO: 0 K/UL (ref 0–0.04)
IMM GRANULOCYTES NFR BLD AUTO: 0 % (ref 0–0.5)
INR PPP: 1 (ref 0.8–1.2)
KETONES UR QL STRIP.AUTO: ABNORMAL MG/DL
LEUKOCYTE ESTERASE UR QL STRIP.AUTO: ABNORMAL
LIPASE SERPL-CCNC: 242 U/L (ref 73–393)
LYMPHOCYTES # BLD: 1.6 K/UL (ref 0.9–3.6)
LYMPHOCYTES NFR BLD: 23 % (ref 21–52)
MCH RBC QN AUTO: 24 PG (ref 24–34)
MCHC RBC AUTO-ENTMCNC: 30.8 G/DL (ref 31–37)
MCV RBC AUTO: 77.9 FL (ref 78–100)
MONOCYTES # BLD: 0.5 K/UL (ref 0.05–1.2)
MONOCYTES NFR BLD: 7 % (ref 3–10)
MUCOUS THREADS URNS QL MICRO: ABNORMAL /LPF
NEUTS SEG # BLD: 4.4 K/UL (ref 1.8–8)
NEUTS SEG NFR BLD: 63 % (ref 40–73)
NITRITE UR QL STRIP.AUTO: NEGATIVE
NRBC # BLD: 0 K/UL (ref 0–0.01)
NRBC BLD-RTO: 0 PER 100 WBC
PH UR STRIP: 5.5 [PH] (ref 5–8)
PLATELET # BLD AUTO: 315 K/UL (ref 135–420)
PMV BLD AUTO: 11.3 FL (ref 9.2–11.8)
POTASSIUM SERPL-SCNC: 4.3 MMOL/L (ref 3.5–5.5)
PROT SERPL-MCNC: 8 G/DL (ref 6.4–8.2)
PROT UR STRIP-MCNC: NEGATIVE MG/DL
PROTHROMBIN TIME: 13.4 SEC (ref 11.5–15.2)
RBC # BLD AUTO: 4.75 M/UL (ref 4.2–5.3)
RBC #/AREA URNS HPF: NEGATIVE /HPF (ref 0–5)
S PYO AG THROAT QL: NEGATIVE
SODIUM SERPL-SCNC: 138 MMOL/L (ref 136–145)
SOURCE, COVRS: NORMAL
SP GR UR REFRACTOMETRY: 1.02 (ref 1–1.03)
TROPONIN-HIGH SENSITIVITY: 18 NG/L (ref 0–54)
TROPONIN-HIGH SENSITIVITY: 18 NG/L (ref 0–54)
UROBILINOGEN UR QL STRIP.AUTO: 1 EU/DL (ref 0.2–1)
WBC # BLD AUTO: 7 K/UL (ref 4.6–13.2)
WBC URNS QL MICRO: ABNORMAL /HPF (ref 0–5)

## 2022-11-01 PROCEDURE — 85730 THROMBOPLASTIN TIME PARTIAL: CPT

## 2022-11-01 PROCEDURE — 83880 ASSAY OF NATRIURETIC PEPTIDE: CPT

## 2022-11-01 PROCEDURE — 87804 INFLUENZA ASSAY W/OPTIC: CPT

## 2022-11-01 PROCEDURE — 70450 CT HEAD/BRAIN W/O DYE: CPT

## 2022-11-01 PROCEDURE — 83690 ASSAY OF LIPASE: CPT

## 2022-11-01 PROCEDURE — 85025 COMPLETE CBC W/AUTO DIFF WBC: CPT

## 2022-11-01 PROCEDURE — 93005 ELECTROCARDIOGRAM TRACING: CPT

## 2022-11-01 PROCEDURE — 71046 X-RAY EXAM CHEST 2 VIEWS: CPT

## 2022-11-01 PROCEDURE — 99285 EMERGENCY DEPT VISIT HI MDM: CPT

## 2022-11-01 PROCEDURE — 84484 ASSAY OF TROPONIN QUANT: CPT

## 2022-11-01 PROCEDURE — 87635 SARS-COV-2 COVID-19 AMP PRB: CPT

## 2022-11-01 PROCEDURE — 87880 STREP A ASSAY W/OPTIC: CPT

## 2022-11-01 PROCEDURE — 80053 COMPREHEN METABOLIC PANEL: CPT

## 2022-11-01 PROCEDURE — 87070 CULTURE OTHR SPECIMN AEROBIC: CPT

## 2022-11-01 PROCEDURE — 82550 ASSAY OF CK (CPK): CPT

## 2022-11-01 PROCEDURE — 81001 URINALYSIS AUTO W/SCOPE: CPT

## 2022-11-01 PROCEDURE — 85610 PROTHROMBIN TIME: CPT

## 2022-11-01 RX ORDER — CEPHALEXIN 500 MG/1
500 CAPSULE ORAL 4 TIMES DAILY
Qty: 28 CAPSULE | Refills: 0 | Status: SHIPPED | OUTPATIENT
Start: 2022-11-01 | End: 2022-11-08
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
## 2022-11-03 LAB
BACTERIA SPEC CULT: NORMAL
SERVICE CMNT-IMP: NORMAL

## 2022-11-06 LAB
ATRIAL RATE: 80 BPM
ATRIAL RATE: 84 BPM
CALCULATED P AXIS, ECG09: 57 DEGREES
CALCULATED P AXIS, ECG09: 69 DEGREES
CALCULATED R AXIS, ECG10: 104 DEGREES
CALCULATED R AXIS, ECG10: 17 DEGREES
CALCULATED T AXIS, ECG11: 56 DEGREES
CALCULATED T AXIS, ECG11: 63 DEGREES
DIAGNOSIS, 93000: NORMAL
DIAGNOSIS, 93000: NORMAL
P-R INTERVAL, ECG05: 164 MS
P-R INTERVAL, ECG05: 166 MS
Q-T INTERVAL, ECG07: 394 MS
Q-T INTERVAL, ECG07: 410 MS
QRS DURATION, ECG06: 78 MS
QRS DURATION, ECG06: 80 MS
QTC CALCULATION (BEZET), ECG08: 465 MS
QTC CALCULATION (BEZET), ECG08: 472 MS
VENTRICULAR RATE, ECG03: 80 BPM
VENTRICULAR RATE, ECG03: 84 BPM

## 2024-04-11 NOTE — DISCHARGE SUMMARY
Brief Discharge Summary    Admission and discharge within same date. Please see my H and P from earlier today for full details. In summary, the patient was admitted w/ chest pain w/ concern for ACS. Cardiac enzymes were negative and there were no EKG changes. CTPE was negative. The patient remained stable during hospitalization. She is discharged to home in stable condition. No medications have been changed. She is to follow up with her PCP in 3-5 days and should arrange an outpatient stress test at that time.       Savana Williamson, 
Not Applicable

## (undated) DEVICE — PACK PROCEDURE SURG CATH CUST

## (undated) DEVICE — SYR POWER 150ML 8IN FILL TUBE --

## (undated) DEVICE — 3M™ TEGADERM™ TRANSPARENT FILM DRESSING FRAME STYLE, 1626W, 4 IN X 4-3/4 IN (10 CM X 12 CM), 50/CT 4CT/CASE: Brand: 3M™ TEGADERM™

## (undated) DEVICE — SHIELD RAD 14X16 IN W/ SCOOP ABSORBER

## (undated) DEVICE — TUBING PRSS MON L24IN PVC RIG NONEXPANDING M TO FEM CONN

## (undated) DEVICE — PINNACLE INTRODUCER SHEATH: Brand: PINNACLE

## (undated) DEVICE — DRAPE,ANGIO,BRACH,STERILE,38X44: Brand: MEDLINE

## (undated) DEVICE — SENSOR PLSE OXMTR AD CBL L36IN ADH FRM FIT SPO2 DISP

## (undated) DEVICE — GLIDESHEATH SLENDER STAINLESS STEEL KIT: Brand: GLIDESHEATH SLENDER

## (undated) DEVICE — SWAN-GANZ POLYMER BLEND TRUE SIZE C-TIP CONTROLCATH TD CATHETER: Brand: SWAN-GANZ CONTROLCATH TRUE SIZE

## (undated) DEVICE — SPLINT WR POS F/ARTERIAL ACC -- BX/10

## (undated) DEVICE — GUIDEWIRE VASC L260CM DIA0.035IN RAD 3MM J TIP L7CM PTFE

## (undated) DEVICE — STOPCOCK TRNSDUC 500PSI 3 W ROT M LUER LT BLU OFF HNDL R

## (undated) DEVICE — MICROPUNCTURE INTRODUCER SET SILHOUETTE TRANSITIONLESS WITH NITINOL WIRE GUIDE: Brand: MICROPUNCTURE

## (undated) DEVICE — Device

## (undated) DEVICE — CATH DIAG FR4 EXPO 5FRX100CM -- EXPO

## (undated) DEVICE — PROCEDURE KIT FLUID MGMT 10 FR CUST MAINFOLD

## (undated) DEVICE — PRESSURE MONITORING SET: Brand: TRUWAVE

## (undated) DEVICE — TORCON NB, ADVANTAGE CATHETER: Brand: TORCON NB

## (undated) DEVICE — RADIFOCUS GLIDEWIRE: Brand: GLIDEWIRE

## (undated) DEVICE — CATHETER ANGIO 6FR L110CM 145DEG VENT POLYUR PGTL 6 SIDE H

## (undated) DEVICE — BAND RADIAL COMPR ARTERY 24CM -- REG BX/10